# Patient Record
Sex: MALE | Race: WHITE | Employment: OTHER | RURAL
[De-identification: names, ages, dates, MRNs, and addresses within clinical notes are randomized per-mention and may not be internally consistent; named-entity substitution may affect disease eponyms.]

---

## 2017-02-01 ENCOUNTER — OFFICE VISIT (OUTPATIENT)
Dept: SURGERY | Age: 77
End: 2017-02-01

## 2017-02-01 VITALS
BODY MASS INDEX: 26.03 KG/M2 | DIASTOLIC BLOOD PRESSURE: 81 MMHG | SYSTOLIC BLOOD PRESSURE: 132 MMHG | WEIGHT: 192.2 LBS | HEART RATE: 60 BPM | HEIGHT: 72 IN

## 2017-02-01 DIAGNOSIS — D12.6 ADENOMATOUS COLON POLYP: Primary | ICD-10-CM

## 2017-02-01 DIAGNOSIS — Z01.818 PREOPERATIVE EXAMINATION: ICD-10-CM

## 2017-02-01 NOTE — PROGRESS NOTES
HISTORY OF PRESENT ILLNESS  Luciana Santoro is a 67 y.o. male. Patient was referred by Wiley Jolly MD for evaluation for  colonoscopy. HPI history of prior colonoscopy with polypectomy adenomatous polyps were removed and a 5 year follow-up colonoscopy was recommended. He has had some recent change in his bowel habit characterized by constipation he attributes this to the narcotic analgesics used around the time of his orthopedic surgery. Presently is fairly well compensated using MiraLAX and some leukocyte. There is been no blood in his stool there is been no unexplained weight loss or abdominal pain. Past Medical History   Diagnosis Date    CAD (coronary artery disease)      3 cardiac stents    Cervical spondylosis without myelopathy 2009    Chronic pain     Depressive disorder, not elsewhere classified     Diaphragmatic hernia without mention of obstruction or gangrene     GERD (gastroesophageal reflux disease)     Hypercholesterolemia     Hypertension     Hypertrophy of prostate without urinary obstruction and other lower urinary tract symptoms (LUTS) 2008    Insomnia, unspecified 2009    Lumbosacral spondylosis without myelopathy 2009    Osteoarthrosis involving, or with mention of more than one site, but not specified as generalized, multiple sites 2010    Varicose veins 12/11/2014       Past Surgical History   Procedure Laterality Date    Hx appendectomy      Hx hernia repair      Pr cabg, artery-vein, two  1/9/12    Hx heart catheterization  1998, 2001     3 stents    Hx colonoscopy  2010,     Hx endoscopy  2010     HH, no other abnl    Hx tonsillectomy      Hx heent       fx nose repair    Hx cataract removal       bilateral         Current Outpatient Prescriptions:     DULoxetine (CYMBALTA) 30 mg capsule, TAKE ONE CAPSULE BY MOUTH EVERY DAY, Disp: 30 Cap, Rfl: 2    DULoxetine (CYMBALTA) 60 mg capsule, Take 1 Cap by mouth daily. , Disp: 30 Cap, Rfl: 3   IBUPROFEN/DIPHENHYDRAMINE CIT (ADVIL PM PO), Take  by mouth., Disp: , Rfl:     DULoxetine (CYMBALTA) 60 mg capsule, Take 1 Cap by mouth every morning., Disp: 30 Cap, Rfl: 3    tamsulosin (FLOMAX) 0.4 mg capsule, Take 2 Caps by mouth nightly., Disp: 60 Cap, Rfl: 2    polyethylene glycol (MIRALAX) 17 gram packet, Take 1 Packet by mouth daily. , Disp: 15 Packet, Rfl: 0    OMEPRAZOLE PO, Take  by mouth nightly., Disp: , Rfl:     METOPROLOL TARTRATE PO, Take 12.5 mg by mouth two (2) times a day., Disp: , Rfl:     DOCUSATE CALCIUM (STOOL SOFTENER PO), Take  by mouth daily. , Disp: , Rfl:     multivitamin (ONE A DAY) tablet, Take 1 Tab by mouth daily. , Disp: , Rfl:     aspirin 81 mg tablet, Take 81 mg by mouth., Disp: , Rfl:     dutaseride (AVODART) 0.5 mg capsule, Take 0.5 mg by mouth Three (3) times a week., Disp: , Rfl:     simvastatin (ZOCOR) 20 mg tablet, Take  by mouth nightly., Disp: , Rfl:     melatonin 10 mg tab, Take  by mouth., Disp: , Rfl:     CARISOPRODOL (SOMA PO), Take  by mouth. somnapure at hs as needed, Disp: , Rfl:     celecoxib (CELEBREX) 200 mg capsule, Take 200 mg by mouth every morning. Indications: OSTEOARTHRITIS, Disp: , Rfl:     Review of patient's allergies indicates no known allergies. family history includes Cancer in his brother; Diabetes in his maternal grandfather; Heart Disease in his brother and father; Hypertension in his father. Social History     Social History    Marital status:      Spouse name: N/A    Number of children: N/A    Years of education: N/A     Occupational History    Not on file.      Social History Main Topics    Smoking status: Former Smoker     Packs/day: 1.00     Years: 10.00     Types: Cigarettes     Start date: 1/1/1961     Quit date: 1/1/1971    Smokeless tobacco: Never Used    Alcohol use No    Drug use: No    Sexual activity: Not on file     Other Topics Concern    Not on file     Social History Narrative         Review of Systems Constitutional: Negative. HENT: Negative. Eyes: Negative. Respiratory: Negative. Cardiovascular: Negative. Gastrointestinal: Positive for constipation. Negative for abdominal pain, blood in stool, diarrhea, heartburn, melena, nausea and vomiting. Genitourinary: Negative. Musculoskeletal: Negative. Skin: Negative. Neurological: Negative. Endo/Heme/Allergies: Negative. Psychiatric/Behavioral: Negative. Physical Exam   Constitutional: He is oriented to person, place, and time. He appears well-developed and well-nourished. No distress. HENT:   Head: Normocephalic and atraumatic. Right Ear: External ear normal.   Left Ear: External ear normal.   Nose: Nose normal.   Mouth/Throat: Oropharynx is clear and moist. No oropharyngeal exudate. Eyes: Conjunctivae and EOM are normal. Pupils are equal, round, and reactive to light. Right eye exhibits no discharge. Left eye exhibits no discharge. No scleral icterus. Neck: Normal range of motion. Neck supple. No JVD present. No tracheal deviation present. No thyromegaly present. Cardiovascular: Normal rate, regular rhythm, normal heart sounds and intact distal pulses. Exam reveals no gallop and no friction rub. No murmur heard. Pulmonary/Chest: Effort normal and breath sounds normal. No stridor. No respiratory distress. He has no wheezes. He has no rales. He exhibits no tenderness. Abdominal: Soft. Bowel sounds are normal. He exhibits no distension and no mass. There is no tenderness. There is no rebound and no guarding. Musculoskeletal: Normal range of motion. He exhibits no edema, tenderness or deformity. Lymphadenopathy:     He has no cervical adenopathy. Neurological: He is alert and oriented to person, place, and time. He has normal reflexes. He displays normal reflexes. No cranial nerve deficit. He exhibits normal muscle tone. Coordination normal.   Skin: Skin is warm and dry. No rash noted. He is not diaphoretic. No erythema. No pallor. Psychiatric: He has a normal mood and affect. His behavior is normal. Judgment and thought content normal.       ASSESSMENT and PLAN    No history of colon polyps. Requires updated H&P for institutional requirements for surgery. Patient will be scheduled at \[facility]\"" for colonoscopy. She prefers magnesium citrate prep. The expected benefits and potential risks and alternatives are discussed with the patient who demonstrates understanding and expresses his wish to have the procedure. More than half of the time spent with the patient was in face to face counseling. I spent 45 minutes in this encounter with the patient.

## 2017-02-01 NOTE — PATIENT INSTRUCTIONS
Colonoscopy: Before Your Procedure  What is a colonoscopy? A colonoscopy is a test that lets a doctor look inside your colon. The doctor uses a thin, lighted tube called a colonoscope to look for problems. These include small growths called polyps, cancer, or bleeding. During the test, the doctor can take samples of tissue that can be checked for cancer or other problems. This is called a biopsy. The doctor can also take out polyps. Before the test, you will need to stop eating solid foods. You also will drink a liquid or take a tablet that cleans out your colon. This helps your doctor be able to see inside your colon during the test.  Follow-up care is a key part of your treatment and safety. Be sure to make and go to all appointments, and call your doctor if you are having problems. It's also a good idea to know your test results and keep a list of the medicines you take. What happens before the procedure? Procedures can be stressful. This information will help you understand what you can expect. And it will help you safely prepare for your procedure. Preparing for the procedure  · Understand exactly what procedure is planned, along with the risks, benefits, and other options. · Tell your doctors ALL the medicines, vitamins, supplements, and herbal remedies you take. Some of these can increase the risk of bleeding or interact with anesthesia. · If you take blood thinners, such as warfarin (Coumadin), clopidogrel (Plavix), or aspirin, be sure to talk to your doctor. He or she will tell you if you should stop taking these medicines before your procedure. Make sure that you understand exactly what your doctor wants you to do. · Your doctor will tell you which medicines to take or stop before your procedure. You may need to stop taking certain medicines a week or more before the procedure. So talk to your doctor as soon as you can. · If you have an advance directive, let your doctor know.  It may include a living will and a durable power of  for health care. Bring a copy to the hospital. If you don't have one, you may want to prepare one. It lets your doctor and loved ones know your health care wishes. Doctors advise that everyone prepare these papers before any type of surgery or procedure. Before the procedure  · Follow your doctor's directions about when to stop eating solid foods and drink only clear liquids. You can drink water, clear juices, clear broths, flavored ice pops, and gelatin (such as Jell-O). Do not eat or drink anything red or purple. This includes grape juice and grape-flavored ice pops. It also includes fruit punch and cherry gelatin. · Drink the \"colon prep\" liquid as your doctor tells you. You will want to stay home, because the liquid will make you go to the bathroom a lot. Your stools will be loose and watery. It is very important to drink all of the liquid. If you have problems drinking it, call your doctor. Some doctors may have you take a tablet rather than drink a liquid. · Do not eat any solid foods after you drink the colon prep. · Stop drinking clear liquids 6 to 8 hours before the test.  What happens on the day of the procedure? · Follow the instructions exactly about when to stop eating and drinking. If you don't, your procedure may be canceled. If your doctor told you to take your medicines on the day of the procedure, take them with only a sip of water. · Take a bath or shower before you come in for your procedure. Do not apply lotions, perfumes, deodorants, or nail polish. · Take off all jewelry and piercings. And take out contact lenses, if you wear them. At the [address] [place] [address] or [facility]  · Bring a picture ID. · You will be kept comfortable and safe by your anesthesia provider. The anesthesia may make you sleep. · You will lie on your back or your side with your knees drawn up toward your belly.  The doctor will gently put a gloved finger into your anus. Then the doctor puts the scope in and moves it into your colon. The scope goes in easily because it is lubricated. · The doctor may also use small tools to take tissue samples for a biopsy or to remove polyps. This does not hurt. · The test usually takes 30 to 45 minutes. But it may take longer. It depends on what is found and what is done. Going home  · Be sure you have someone to drive you home. Anesthesia and pain medicine make it unsafe for you to drive. · You will be given more specific instructions about recovering from your procedure. When should you call your doctor? · You have questions or concerns. · You don't understand how to prepare for your procedure. · You are having trouble with the bowel prep. · You become ill before the procedure (such as fever, flu, or a cold). · You need to reschedule or have changed your mind about having the procedure. Where can you learn more? Go to http://zamzam-kun.info/. Enter C315 in the search box to learn more about \"Colonoscopy: Before Your Procedure. \"  Current as of: August 9, 2016  Content Version: 11.1  © 7102-5080 Healthwise, Incorporated. Care instructions adapted under license by [employer] (which disclaims liability or warranty for this information). If you have questions about a medical condition or this instruction, always ask your healthcare professional. [address] any warranty or liability for your use of this information.

## 2017-02-06 RX ORDER — TAMSULOSIN HYDROCHLORIDE 0.4 MG/1
CAPSULE ORAL
Qty: 60 CAP | Refills: 2 | Status: SHIPPED | OUTPATIENT
Start: 2017-02-06 | End: 2017-05-05 | Stop reason: SDUPTHER

## 2017-03-08 ENCOUNTER — OFFICE VISIT (OUTPATIENT)
Dept: SURGERY | Age: 77
End: 2017-03-08

## 2017-03-08 VITALS
SYSTOLIC BLOOD PRESSURE: 106 MMHG | WEIGHT: 192.2 LBS | BODY MASS INDEX: 26.03 KG/M2 | DIASTOLIC BLOOD PRESSURE: 67 MMHG | HEART RATE: 82 BPM | HEIGHT: 72 IN

## 2017-03-08 DIAGNOSIS — K63.5 HYPERPLASTIC COLON POLYP: ICD-10-CM

## 2017-03-08 DIAGNOSIS — Z86.010 HX OF ADENOMATOUS COLONIC POLYPS: Primary | ICD-10-CM

## 2017-03-08 NOTE — PROGRESS NOTES
HISTORY OF PRESENT ILLNESS  Augustin Sena is a 67 y.o. male. Status post colonoscopy with polypectomy. A hyperplastic polyp was removed from the rectum. No recurrent adenomatous polyps were identified on this study. HPI he reports having tolerated the preparation and the procedure well    ROS there are no new complaints    Physical Exam his examination is benign    ASSESSMENT and PLAN  Hyperplastic colon polyp removed. History of adenomatous colon polyps, recommend 5 year evaluation for consideration of repeat colonoscopy in the context of his health at that time. That is to say if this is health is is good then as it is today I would consider repeat colonoscopy. If there were issues compromising his health and the risk-benefit ratio seemed unfavorable I would not recommend colonoscopy. More than half of the time spent with the patient was in face to face counseling. I spent 15 minutes in this encounter with the patient.

## 2017-03-08 NOTE — MR AVS SNAPSHOT
Visit Information Date & Time Provider Department Dept. Phone Encounter #  
 3/8/2017 10:30 AM Avelina Villalobos  [address] [place] [phone] [id number] Follow-up Instructions Return in about 5 years (around 3/8/2022). Follow-up and Disposition History Upcoming Health Maintenance Date Due ZOSTER VACCINE AGE 60> 11/26/1999 GLAUCOMA SCREENING Q2Y 11/26/2004 MEDICARE YEARLY EXAM 12/10/2016 COLONOSCOPY 2/21/2022 DTaP/Tdap/Td series (2 - Td) 12/10/2025 Allergies as of 3/8/2017  Review Complete On: 3/8/2017 By: Deya Hui LPN No Known Allergies Current Immunizations  Reviewed on 10/21/2016 Name Date Influenza High Dose Vaccine PF 10/21/2016 Influenza Vaccine 10/27/2014 Influenza Vaccine Seretha Cave) 12/10/2015 Pneumococcal Conjugate (PCV-13) 10/21/2016, 12/7/2015 Pneumococcal Polysaccharide (PPSV-23) 8/28/2013 Tdap 12/10/2015 Not reviewed this visit You Were Diagnosed With   
  
 Codes Comments Hx of adenomatous colonic polyps    -  Primary ICD-10-CM: Z86.010 
ICD-9-CM: V12.72 Hyperplastic colon polyp     ICD-10-CM: K63.5 ICD-9-CM: 211.3 Vitals BP Pulse Height(growth percentile) Weight(growth percentile) BMI Smoking Status 106/67 82 6' (1.829 m) 192 lb 3.2 oz (87.2 kg) 26.07 kg/m2 Former Smoker Vitals History BMI and BSA Data Body Mass Index Body Surface Area 26.07 kg/m 2 2.1 m 2 Preferred Pharmacy Pharmacy Name Phone [id number] [address] [phone] Your Updated Medication List  
  
   
This list is accurate as of: 3/8/17  2:51 PM.  Always use your most recent med list. ADVIL PM PO Take  by mouth. aspirin 81 mg tablet Take 81 mg by mouth. AVODART 0.5 mg capsule Generic drug:  dutasteride Take 0.5 mg by mouth Three (3) times a week. celecoxib 200 mg capsule Commonly known as:  CELEBREX Take 200 mg by mouth every morning. Indications: OSTEOARTHRITIS * DULoxetine 60 mg capsule Commonly known as:  CYMBALTA Take 1 Cap by mouth every morning. * DULoxetine 60 mg capsule Commonly known as:  CYMBALTA Take 1 Cap by mouth daily. * DULoxetine 30 mg capsule Commonly known as:  CYMBALTA TAKE ONE CAPSULE BY MOUTH EVERY DAY  
  
 melatonin 10 mg Tab Take  by mouth. METOPROLOL TARTRATE PO Take 12.5 mg by mouth two (2) times a day. multivitamin tablet Commonly known as:  ONE A DAY Take 1 Tab by mouth daily. OMEPRAZOLE PO Take  by mouth nightly. polyethylene glycol 17 gram packet Commonly known as:  Kreg Patron Take 1 Packet by mouth daily. simvastatin 20 mg tablet Commonly known as:  ZOCOR Take  by mouth nightly. SOMA PO Take  by mouth. somnapure at hs as needed STOOL SOFTENER PO Take  by mouth daily. tamsulosin 0.4 mg capsule Commonly known as:  FLOMAX TAKE 2 CAPSULE BY MOUTH NIGHTLY. * Notice: This list has 3 medication(s) that are the same as other medications prescribed for you. Read the directions carefully, and ask your doctor or other care provider to review them with you. Follow-up Instructions Return in about 5 years (around 3/8/2022). Introducing [facility] & HEALTH SERVICES! Jose Pepe introduces [employer] patient portal. Now you can access parts of your medical record, email your doctor's office, and request medication refills online. 1. In your internet browser, go to [email or web address] 2. Click on the First Time User? Click Here link in the Sign In box. You will see the New Member Sign Up page. 3. Enter your [employer] Access Code exactly as it appears below. You will not need to use this code after youve completed the sign-up process.  If you do not sign up before the expiration date, you must request a new code. · [employer] Access Code: [email or web address] Expires: 5/2/2017 10:24 AM 
 
4. Enter the last four digits of your Social Security Number (xxxx) and Date of Birth (mm/dd/yyyy) as indicated and click Submit. You will be taken to the next sign-up page. 5. Create a [employer] ID. This will be your [employer] login ID and cannot be changed, so think of one that is secure and easy to remember. 6. Create a [employer] password. You can change your password at any time. 7. Enter your Password Reset Question and Answer. This can be used at a later time if you forget your password. 8. Enter your e-mail address. You will receive e-mail notification when new information is available in [address] 9. Click Sign Up. You can now view and download portions of your medical record. 10. Click the Download Summary menu link to download a portable copy of your medical information. If you have questions, please visit the Frequently Asked Questions section of the [employer] website. Remember, [employer] is NOT to be used for urgent needs. For medical emergencies, dial 911. Now available from your iPhone and Android! Please provide this summary of care documentation to your next provider. Your primary care clinician is listed as Michael Jain. If you have any questions after today's visit, please call [phone].

## 2017-04-07 ENCOUNTER — CLINICAL SUPPORT (OUTPATIENT)
Dept: FAMILY MEDICINE CLINIC | Age: 77
End: 2017-04-07

## 2017-04-07 DIAGNOSIS — E78.2 MIXED HYPERLIPIDEMIA: Primary | ICD-10-CM

## 2017-04-07 DIAGNOSIS — I25.10 ATHEROSCLEROSIS OF NATIVE CORONARY ARTERY OF NATIVE HEART, ANGINA PRESENCE UNSPECIFIED: ICD-10-CM

## 2017-04-07 NOTE — MR AVS SNAPSHOT
Visit Information Date & Time Provider Department Dept. Phone Encounter #  
 4/7/2017  8:45 AM [facility] [address] [facility] [phone] [id number] Upcoming Health Maintenance Date Due ZOSTER VACCINE AGE 60> 11/26/1999 GLAUCOMA SCREENING Q2Y 11/26/2004 MEDICARE YEARLY EXAM 12/10/2016 COLONOSCOPY 2/21/2022 DTaP/Tdap/Td series (2 - Td) 12/10/2025 Allergies as of 4/7/2017  Review Complete On: 4/7/2017 By: Mine Ruby No Known Allergies Current Immunizations  Reviewed on 10/21/2016 Name Date Influenza High Dose Vaccine PF 10/21/2016 Influenza Vaccine 10/27/2014 Influenza Vaccine Corine Hinton) 12/10/2015 Pneumococcal Conjugate (PCV-13) 10/21/2016, 12/7/2015 Pneumococcal Polysaccharide (PPSV-23) 8/28/2013 Tdap 12/10/2015 Not reviewed this visit You Were Diagnosed With   
  
 Codes Comments Mixed hyperlipidemia    -  Primary ICD-10-CM: D02.7 ICD-9-CM: 272.2 Atherosclerosis of native coronary artery of native heart, angina presence unspecified     ICD-10-CM: I25.10 ICD-9-CM: 414.01 Vitals Smoking Status Former Smoker Preferred Pharmacy Pharmacy Name Phone [id number] [address] [phone] Your Updated Medication List  
  
   
This list is accurate as of: 4/7/17  9:13 AM.  Always use your most recent med list. ADVIL PM PO Take  by mouth. aspirin 81 mg tablet Take 81 mg by mouth. AVODART 0.5 mg capsule Generic drug:  dutasteride Take 0.5 mg by mouth Three (3) times a week. celecoxib 200 mg capsule Commonly known as:  CELEBREX Take 200 mg by mouth every morning. Indications: OSTEOARTHRITIS * DULoxetine 60 mg capsule Commonly known as:  CYMBALTA Take 1 Cap by mouth every morning. * DULoxetine 60 mg capsule Commonly known as:  CYMBALTA Take 1 Cap by mouth daily. * DULoxetine 30 mg capsule Commonly known as:  CYMBALTA TAKE ONE CAPSULE BY MOUTH EVERY DAY  
  
 melatonin 10 mg Tab Take  by mouth. METOPROLOL TARTRATE PO Take 12.5 mg by mouth two (2) times a day. multivitamin tablet Commonly known as:  ONE A DAY Take 1 Tab by mouth daily. OMEPRAZOLE PO Take  by mouth nightly. polyethylene glycol 17 gram packet Commonly known as:  Tracie Pile Take 1 Packet by mouth daily. simvastatin 20 mg tablet Commonly known as:  ZOCOR Take  by mouth nightly. SOMA PO Take  by mouth. somnapure at hs as needed STOOL SOFTENER PO Take  by mouth daily. tamsulosin 0.4 mg capsule Commonly known as:  FLOMAX TAKE 2 CAPSULE BY MOUTH NIGHTLY. * Notice: This list has 3 medication(s) that are the same as other medications prescribed for you. Read the directions carefully, and ask your doctor or other care provider to review them with you. We Performed the Following COLLECTION CAPILLARY BLOOD SPECIMEN [05480 CPT(R)] HEPATIC FUNCTION PANEL [04876 CPT(R)] LIPID PANEL [11428 CPT(R)] Introducing [facility]! Marilee Benavidez introduces [employer] patient portal. Now you can access parts of your medical record, email your doctor's office, and request medication refills online. 1. In your internet browser, go to [email or web address] 2. Click on the First Time User? Click Here link in the Sign In box. You will see the New Member Sign Up page. 3. Enter your [employer] Access Code exactly as it appears below. You will not need to use this code after youve completed the sign-up process. If you do not sign up before the expiration date, you must request a new code. · [employer] Access Code: [email or web address] Expires: 5/2/2017 11:24 AM 
 
4.  Enter the last four digits of your Social Security Number (xxxx) and Date of Birth (mm/dd/yyyy) as indicated and click Submit. You will be taken to the next sign-up page. 5. Create a [employer] ID. This will be your [employer] login ID and cannot be changed, so think of one that is secure and easy to remember. 6. Create a [employer] password. You can change your password at any time. 7. Enter your Password Reset Question and Answer. This can be used at a later time if you forget your password. 8. Enter your e-mail address. You will receive e-mail notification when new information is available in [address] 9. Click Sign Up. You can now view and download portions of your medical record. 10. Click the Download Summary menu link to download a portable copy of your medical information. If you have questions, please visit the Frequently Asked Questions section of the [employer] website. Remember, [employer] is NOT to be used for urgent needs. For medical emergencies, dial 911. Now available from your iPhone and Android! Please provide this summary of care documentation to your next provider. Your primary care clinician is listed as Janes Covarrubias. If you have any questions after today's visit, please call [phone].

## 2017-04-08 LAB
ALBUMIN SERPL-MCNC: 4.2 G/DL (ref 3.5–4.8)
ALP SERPL-CCNC: 57 IU/L (ref 39–117)
ALT SERPL-CCNC: 14 IU/L (ref 0–44)
AST SERPL-CCNC: 20 IU/L (ref 0–40)
BILIRUB DIRECT SERPL-MCNC: 0.12 MG/DL (ref 0–0.4)
BILIRUB SERPL-MCNC: 0.5 MG/DL (ref 0–1.2)
PROT SERPL-MCNC: 6.1 G/DL (ref 6–8.5)

## 2017-04-19 LAB
CHOLEST SERPL-MCNC: NORMAL MG/DL
HDLC SERPL-MCNC: NORMAL MG/DL
SPECIMEN STATUS REPORT, ROLRST: NORMAL
TRIGL SERPL-MCNC: NORMAL MG/DL (ref ?–150)

## 2017-04-20 LAB
CHOLEST SERPL-MCNC: 131 MG/DL (ref 100–199)
HDLC SERPL-MCNC: 52 MG/DL
LDLC SERPL CALC-MCNC: 60 MG/DL (ref 0–99)
TRIGL SERPL-MCNC: 95 MG/DL (ref 0–149)
VLDLC SERPL CALC-MCNC: 19 MG/DL (ref 5–40)

## 2017-04-24 RX ORDER — DULOXETIN HYDROCHLORIDE 30 MG/1
CAPSULE, DELAYED RELEASE ORAL
Qty: 30 CAP | Refills: 0 | Status: SHIPPED | OUTPATIENT
Start: 2017-04-24 | End: 2017-05-22 | Stop reason: SDUPTHER

## 2017-05-05 RX ORDER — TAMSULOSIN HYDROCHLORIDE 0.4 MG/1
CAPSULE ORAL
Qty: 60 CAP | Refills: 1 | Status: SHIPPED | OUTPATIENT
Start: 2017-05-05 | End: 2018-02-25 | Stop reason: SDUPTHER

## 2017-05-22 RX ORDER — DULOXETIN HYDROCHLORIDE 30 MG/1
CAPSULE, DELAYED RELEASE ORAL
Qty: 30 CAP | Refills: 0 | Status: SHIPPED | OUTPATIENT
Start: 2017-05-22 | End: 2017-07-07 | Stop reason: SDUPTHER

## 2017-06-27 ENCOUNTER — OFFICE VISIT (OUTPATIENT)
Dept: FAMILY MEDICINE CLINIC | Age: 77
End: 2017-06-27

## 2017-06-27 VITALS
OXYGEN SATURATION: 96 % | HEIGHT: 72 IN | BODY MASS INDEX: 25.06 KG/M2 | DIASTOLIC BLOOD PRESSURE: 70 MMHG | HEART RATE: 85 BPM | TEMPERATURE: 98.4 F | RESPIRATION RATE: 16 BRPM | SYSTOLIC BLOOD PRESSURE: 110 MMHG | WEIGHT: 185 LBS

## 2017-06-27 DIAGNOSIS — Z00.00 HEALTH CARE MAINTENANCE: ICD-10-CM

## 2017-06-27 DIAGNOSIS — E78.00 HYPERCHOLESTEROLEMIA: ICD-10-CM

## 2017-06-27 DIAGNOSIS — M15.9 PRIMARY OSTEOARTHRITIS INVOLVING MULTIPLE JOINTS: ICD-10-CM

## 2017-06-27 DIAGNOSIS — Z00.00 ENCOUNTER FOR MEDICARE ANNUAL WELLNESS EXAM: ICD-10-CM

## 2017-06-27 DIAGNOSIS — I10 ESSENTIAL HYPERTENSION: Primary | ICD-10-CM

## 2017-06-27 NOTE — ACP (ADVANCE CARE PLANNING)
ACP planning discuss with patient , do not know if he have an ACP or not  ,form given to patient ,to complete.

## 2017-06-27 NOTE — MR AVS SNAPSHOT
Visit Information Date & Time Provider Department Dept. Phone Encounter #  
 6/27/2017  3:00 PM Juve Estrada NP [address] [phone] [id number] Upcoming Health Maintenance Date Due ZOSTER VACCINE AGE 60> 11/26/1999 GLAUCOMA SCREENING Q2Y 11/26/2004 MEDICARE YEARLY EXAM 12/10/2016 INFLUENZA AGE 9 TO ADULT 8/1/2017 COLONOSCOPY 2/21/2022 DTaP/Tdap/Td series (2 - Td) 12/10/2025 Allergies as of 6/27/2017  Review Complete On: 6/27/2017 By: Jane Moran LPN No Known Allergies Current Immunizations  Reviewed on 10/21/2016 Name Date Influenza High Dose Vaccine PF 10/21/2016 Influenza Vaccine 10/27/2014 Influenza Vaccine Otila Aaron) 12/10/2015 Pneumococcal Conjugate (PCV-13) 10/21/2016, 12/7/2015 Pneumococcal Polysaccharide (PPSV-23) 8/28/2013 Tdap 12/10/2015 Not reviewed this visit You Were Diagnosed With   
  
 Codes Comments Essential hypertension    -  Primary ICD-10-CM: I10 
ICD-9-CM: 401.9 Primary osteoarthritis involving multiple joints     ICD-10-CM: M15.0 ICD-9-CM: 715.09 Hypercholesterolemia     ICD-10-CM: E78.00 ICD-9-CM: 272.0 Health care maintenance     ICD-10-CM: Z00.00 ICD-9-CM: V70.0 Vitals BP Pulse Temp Resp Height(growth percentile) Weight(growth percentile) 110/70 85 98.4 °F (36.9 °C) (Oral) 16 6' (1.829 m) 185 lb (83.9 kg) SpO2 BMI Smoking Status 96% 25.09 kg/m2 Former Smoker BMI and BSA Data Body Mass Index Body Surface Area 25.09 kg/m 2 2.06 m 2 Preferred Pharmacy Pharmacy Name Phone [id number] [place] [phone] Your Updated Medication List  
  
   
This list is accurate as of: 6/27/17  3:46 PM.  Always use your most recent med list. ADVIL PM PO Take  by mouth. aspirin 81 mg tablet Take 81 mg by mouth. AVODART 0.5 mg capsule Generic drug:  dutasteride Take 0.5 mg by mouth Three (3) times a week. celecoxib 200 mg capsule Commonly known as:  CELEBREX Take 200 mg by mouth every morning. Indications: OSTEOARTHRITIS * DULoxetine 60 mg capsule Commonly known as:  CYMBALTA Take 1 Cap by mouth every morning. * DULoxetine 30 mg capsule Commonly known as:  CYMBALTA TAKE ONE CAPSULE BY MOUTH EVERY DAY  
  
 melatonin 10 mg Tab Take  by mouth. METOPROLOL TARTRATE PO Take 12.5 mg by mouth two (2) times a day. multivitamin tablet Commonly known as:  ONE A DAY Take 1 Tab by mouth daily. OMEPRAZOLE PO Take  by mouth nightly. polyethylene glycol 17 gram packet Commonly known as:  [place] [employer] Take 1 Packet by mouth daily. simvastatin 20 mg tablet Commonly known as:  ZOCOR Take  by mouth nightly. SOMA PO Take  by mouth. somnapure at hs as needed STOOL SOFTENER PO Take  by mouth daily. tamsulosin 0.4 mg capsule Commonly known as:  FLOMAX TAKE 2 CAPSULE BY MOUTH NIGHTLY. * Notice: This list has 2 medication(s) that are the same as other medications prescribed for you. Read the directions carefully, and ask your doctor or other care provider to review them with you. We Performed the Following CBC WITH AUTOMATED DIFF [19039 CPT(R)] COLLECTION VENOUS BLOOD,VENIPUNCTURE C5564400 CPT(R)] HEMOGLOBIN A1C WITH EAG [24670 CPT(R)] LIPID PANEL [08365 CPT(R)] METABOLIC PANEL, COMPREHENSIVE [14500 CPT(R)] VITAMIN B12 [id number] CPT(R)] VITAMIN D, 25 HYDROXY [id number] CPT(R)] Introducing [facility]! Tony Carrero introduces Stat patient portal. Now you can access parts of your medical record, email your doctor's office, and request medication refills online. 1. In your internet browser, go to [email or web address] 2. Click on the First Time User? Click Here link in the Sign In box.  You will see the New Member Sign Up page. 3. Enter your Towi Access Code exactly as it appears below. You will not need to use this code after youve completed the sign-up process. If you do not sign up before the expiration date, you must request a new code. · Towi Access Code: [email or web address] Expires: 9/25/2017  3:46 PM 
 
4. Enter the last four digits of your Social Security Number (xxxx) and Date of Birth (mm/dd/yyyy) as indicated and click Submit. You will be taken to the next sign-up page. 5. Create a Towi ID. This will be your Towi login ID and cannot be changed, so think of one that is secure and easy to remember. 6. Create a Towi password. You can change your password at any time. 7. Enter your Password Reset Question and Answer. This can be used at a later time if you forget your password. 8. Enter your e-mail address. You will receive e-mail notification when new information is available in [address]. 9. Click Sign Up. You can now view and download portions of your medical record. 10. Click the Download Summary menu link to download a portable copy of your medical information. If you have questions, please visit the Frequently Asked Questions section of the Towi website. Remember, Towi is NOT to be used for urgent needs. For medical emergencies, dial 911. Now available from your iPhone and Android! Please provide this summary of care documentation to your next provider. Your primary care clinician is listed as Lynann Dakins. If you have any questions after today's visit, please call [phone].

## 2017-06-28 LAB
25(OH)D3+25(OH)D2 SERPL-MCNC: 31.5 NG/ML (ref 30–100)
ALBUMIN SERPL-MCNC: 4.5 G/DL (ref 3.5–4.8)
ALBUMIN/GLOB SERPL: 2.3 {RATIO} (ref 1.2–2.2)
ALP SERPL-CCNC: 52 IU/L (ref 39–117)
ALT SERPL-CCNC: 13 IU/L (ref 0–44)
AST SERPL-CCNC: 22 IU/L (ref 0–40)
BASOPHILS # BLD AUTO: 0 X10E3/UL (ref 0–0.2)
BASOPHILS NFR BLD AUTO: 0 %
BILIRUB SERPL-MCNC: 0.7 MG/DL (ref 0–1.2)
BUN SERPL-MCNC: 14 MG/DL (ref 8–27)
BUN/CREAT SERPL: 15 (ref 10–24)
CALCIUM SERPL-MCNC: 9.4 MG/DL (ref 8.6–10.2)
CHLORIDE SERPL-SCNC: 102 MMOL/L (ref 96–106)
CHOLEST SERPL-MCNC: 122 MG/DL (ref 100–199)
CO2 SERPL-SCNC: 26 MMOL/L (ref 18–29)
CREAT SERPL-MCNC: 0.95 MG/DL (ref 0.76–1.27)
EOSINOPHIL # BLD AUTO: 0.2 X10E3/UL (ref 0–0.4)
EOSINOPHIL NFR BLD AUTO: 5 %
ERYTHROCYTE [DISTWIDTH] IN BLOOD BY AUTOMATED COUNT: 13.5 % (ref 12.3–15.4)
EST. AVERAGE GLUCOSE BLD GHB EST-MCNC: 114 MG/DL
GLOBULIN SER CALC-MCNC: 2 G/DL (ref 1.5–4.5)
GLUCOSE SERPL-MCNC: 101 MG/DL (ref 65–99)
HBA1C MFR BLD: 5.6 % (ref 4.8–5.6)
HCT VFR BLD AUTO: 40.2 % (ref 37.5–51)
HDLC SERPL-MCNC: 56 MG/DL
HGB BLD-MCNC: 13.7 G/DL (ref 12.6–17.7)
IMM GRANULOCYTES # BLD: 0 X10E3/UL (ref 0–0.1)
IMM GRANULOCYTES NFR BLD: 0 %
LDLC SERPL CALC-MCNC: 50 MG/DL (ref 0–99)
LYMPHOCYTES # BLD AUTO: 1.4 X10E3/UL (ref 0.7–3.1)
LYMPHOCYTES NFR BLD AUTO: 27 %
MCH RBC QN AUTO: 30.8 PG (ref 26.6–33)
MCHC RBC AUTO-ENTMCNC: 34.1 G/DL (ref 31.5–35.7)
MCV RBC AUTO: 90 FL (ref 79–97)
MONOCYTES # BLD AUTO: 0.3 X10E3/UL (ref 0.1–0.9)
MONOCYTES NFR BLD AUTO: 6 %
NEUTROPHILS # BLD AUTO: 3.1 X10E3/UL (ref 1.4–7)
NEUTROPHILS NFR BLD AUTO: 62 %
PLATELET # BLD AUTO: 183 X10E3/UL (ref 150–379)
POTASSIUM SERPL-SCNC: 4.7 MMOL/L (ref 3.5–5.2)
PROT SERPL-MCNC: 6.5 G/DL (ref 6–8.5)
RBC # BLD AUTO: 4.45 X10E6/UL (ref 4.14–5.8)
SODIUM SERPL-SCNC: 143 MMOL/L (ref 134–144)
TRIGL SERPL-MCNC: 81 MG/DL (ref 0–149)
VIT B12 SERPL-MCNC: 459 PG/ML (ref 211–946)
VLDLC SERPL CALC-MCNC: 16 MG/DL (ref 5–40)
WBC # BLD AUTO: 5 X10E3/UL (ref 3.4–10.8)

## 2017-06-28 NOTE — PROGRESS NOTES
Subjective:     Karin Alexis is a 67 y.o. male who presents today with the following:  Chief Complaint   Patient presents with    Annual Wellness Visit   Presents for Medicare Wellness Exam and evaluation and management for hypertension. Endorses pain at top of L shoulder without any other interval problems and overall reports doing well. Scheduled to see Dr Augustin Harrell tomorrow. Followed by Dr Dennis Wheeler for yearly dermatology skin check and Dr Latisha Drake for eye care. COMPLIANT WITH MEDICATION:   HTN; Denies chest pain, dyspnea, palpitations, headache and blurred vision. Blood pressure normotensive. ROS:  Gen: denies fever, chills, fatigue, weight loss, weight gain  HEENT:denies blurry vision, nasal congestion, sore throat  Resp: denies dypsnea, cough, wheezing  CV: denies chest pain radiating to the jaws or arms, palpitations, lower extremity edema  Abd: denies nausea, vomiting, diarrhea, constipation  Neuro: denies numbness/tingling  Endo: denies polyuria, polydipsia, heat/cold intolerance  Heme: no lymphadenopathy    No Known Allergies      Current Outpatient Prescriptions:     tamsulosin (FLOMAX) 0.4 mg capsule, TAKE 2 CAPSULE BY MOUTH NIGHTLY., Disp: 60 Cap, Rfl: 1    melatonin 10 mg tab, Take  by mouth., Disp: , Rfl:     CARISOPRODOL (SOMA PO), Take  by mouth. somnapure at hs as needed, Disp: , Rfl:     IBUPROFEN/DIPHENHYDRAMINE CIT (ADVIL PM PO), Take  by mouth., Disp: , Rfl:     polyethylene glycol (MIRALAX) 17 gram packet, Take 1 Packet by mouth daily. , Disp: 15 Packet, Rfl: 0    OMEPRAZOLE PO, Take  by mouth nightly., Disp: , Rfl:     METOPROLOL TARTRATE PO, Take 12.5 mg by mouth two (2) times a day., Disp: , Rfl:     celecoxib (CELEBREX) 200 mg capsule, Take 200 mg by mouth every morning. Indications: OSTEOARTHRITIS, Disp: , Rfl:     DOCUSATE CALCIUM (STOOL SOFTENER PO), Take  by mouth daily. , Disp: , Rfl:     multivitamin (ONE A DAY) tablet, Take 1 Tab by mouth daily. , Disp: , Rfl:     aspirin 81 mg tablet, Take 81 mg by mouth., Disp: , Rfl:     dutaseride (AVODART) 0.5 mg capsule, Take 0.5 mg by mouth Three (3) times a week., Disp: , Rfl:     simvastatin (ZOCOR) 20 mg tablet, Take  by mouth nightly., Disp: , Rfl:     DULoxetine (CYMBALTA) 30 mg capsule, TAKE ONE CAPSULE BY MOUTH EVERY DAY, Disp: 30 Cap, Rfl: 0    DULoxetine (CYMBALTA) 60 mg capsule, Take 1 Cap by mouth every morning., Disp: 30 Cap, Rfl: 3    Past Medical History:   Diagnosis Date    CAD (coronary artery disease)     3 cardiac stents    Cervical spondylosis without myelopathy 2009    Chronic pain     Depressive disorder, not elsewhere classified     Diaphragmatic hernia without mention of obstruction or gangrene     GERD (gastroesophageal reflux disease)     Hypercholesterolemia     Hypertension     Hypertrophy of prostate without urinary obstruction and other lower urinary tract symptoms (LUTS) 2008    Insomnia, unspecified 2009    Lumbosacral spondylosis without myelopathy 2009    Osteoarthrosis involving, or with mention of more than one site, but not specified as generalized, multiple sites 2010    Varicose veins 12/11/2014       Past Surgical History:   Procedure Laterality Date    CABG, ARTERY-VEIN, TWO  1/9/12    HX APPENDECTOMY      HX CATARACT REMOVAL      bilateral    HX COLONOSCOPY  2010,     HX COLONOSCOPY  2017    hyperplastic polyp    HX ENDOSCOPY  2010    HH, no other abnl    HX HEART CATHETERIZATION  1998, 2001    3 stents    HX HEENT      fx nose repair    HX HERNIA REPAIR      HX TONSILLECTOMY         History   Smoking Status    Former Smoker    Packs/day: 1.00    Years: 10.00    Types: Cigarettes    Start date: 1/1/1961   Jerica March Quit date: 1/1/1971   Smokeless Tobacco    Never Used       Social History     Social History    Marital status:      Spouse name: N/A    Number of children: N/A    Years of education: N/A     Social History Main Topics    Smoking status: Former Smoker     Packs/day: 1.00     Years: 10.00     Types: Cigarettes     Start date: 1/1/1961     Quit date: 1/1/1971    Smokeless tobacco: Never Used    Alcohol use No    Drug use: No    Sexual activity: Not Asked     Other Topics Concern     Service No    Blood Transfusions No    Caffeine Concern No    Occupational Exposure Yes    Hobby Hazards No    Stress Concern No    Weight Concern Yes    Special Diet No    Back Care No    Exercise Yes    Bike Helmet No    Seat Belt Yes    Self-Exams No     Social History Narrative       Family History   Problem Relation Age of Onset    Hypertension Father     Heart Disease Father     Cancer Brother     Heart Disease Brother     Diabetes Maternal Grandfather          Objective:     Visit Vitals    /70    Pulse 85    Temp 98.4 °F (36.9 °C) (Oral)    Resp 16    Ht 6' (1.829 m)    Wt 185 lb (83.9 kg)    SpO2 96%    BMI 25.09 kg/m2     Body mass index is 25.09 kg/(m^2). General: Alert and oriented. No acute distress. Well nourished  HEENT :  Ears:TMs are normal. Canals are clear. Eyes: pupils equal, round, react to light and accommodation. Extra ocular movements intact. Nose: patent. Mouth and throat is clear. Neck:supple full range of motion no thyromegaly. Trachea midline, No carotid bruits. No significant lymphadenopathy  Lungs[de-identified] clear to auscultation without wheezes, rales, or rhonchi. Heart :RRR, S1 & S2 are normal intensity. No murmur; no gallop  Abdomen: bowel sounds active. No tenderness, guarding, rebound, masses, hepatic or spleen enlargement  Back: no CVA tenderness. Extremities: without clubbing, cyanosis, or edema  Pulses: radial and femoral pulses are normal  Neuro: HMF intact.  Cranial nerves II through XII grossly normal.  Motor: is 5 over 5 and symmetrical.   Deep tendon reflexes: +2 equal    Results for orders placed or performed in visit on 04/07/17   HEPATIC FUNCTION PANEL   Result Value Ref Range Protein, total 6.1 6.0 - 8.5 g/dL    Albumin 4.2 3.5 - 4.8 g/dL    Bilirubin, total 0.5 0.0 - 1.2 mg/dL    Bilirubin, direct 0.12 0.00 - 0.40 mg/dL    Alk. phosphatase 57 39 - 117 IU/L    AST (SGOT) 20 0 - 40 IU/L    ALT (SGPT) 14 0 - 44 IU/L   LIPID PANEL   Result Value Ref Range    Cholesterol, total 131 100 - 199 mg/dL    Triglyceride 95 0 - 149 mg/dL    HDL Cholesterol 52 >39 mg/dL    VLDL, calculated 19 5 - 40 mg/dL    LDL, calculated 60 0 - 99 mg/dL   LIPID PANEL   Result Value Ref Range    Cholesterol, total CANCELED mg/dL    Triglyceride CANCELED     HDL Cholesterol CANCELED    SPECIMEN STATUS REPORT   Result Value Ref Range    SPECIMEN STATUS REPORT COMMENT        No results found for this visit on 06/27/17. Assessment/ Plan:     Toi Cutler was seen today for annual wellness visit. Diagnoses and all orders for this visit:    Essential hypertension  -     CBC WITH AUTOMATED DIFF  -     LIPID PANEL  -     HEMOGLOBIN A1C WITH EAG  -     METABOLIC PANEL, COMPREHENSIVE  -     VITAMIN B12  -     VITAMIN D, 25 HYDROXY  -     COLLECTION VENOUS BLOOD,VENIPUNCTURE    Primary osteoarthritis involving multiple joints    Hypercholesterolemia  -     CBC WITH AUTOMATED DIFF  -     LIPID PANEL  -     HEMOGLOBIN A1C WITH EAG  -     METABOLIC PANEL, COMPREHENSIVE  -     VITAMIN B12  -     VITAMIN D, 25 HYDROXY  -     COLLECTION VENOUS BLOOD,VENIPUNCTURE    Health care maintenance  -     VITAMIN B12  -     VITAMIN D, 25 HYDROXY  -     COLLECTION VENOUS BLOOD,VENIPUNCTURE    Encounter for Medicare annual wellness exam         1. Essential hypertension    2. Primary osteoarthritis involving multiple joints    3. Hypercholesterolemia    4. Health care maintenance    5.  Encounter for Medicare annual wellness exam        Orders Placed This Encounter    COLLECTION VENOUS BLOOD,VENIPUNCTURE    CBC WITH AUTOMATED DIFF    LIPID PANEL    HEMOGLOBIN A1C WITH EAG    METABOLIC PANEL, COMPREHENSIVE    VITAMIN B12    VITAMIN D, 25 HYDROXY         Verbal and written instructions (see AVS) provided.  Patient expresses understanding of diagnosis and treatment plan. Rainell Smoker, FNP-MADDIE          Shahriar Carroll is a 67 y.o. male and presents for annual Medicare Wellness Visit. Problem List: Reviewed with patient and discussed risk factors.     Patient Active Problem List   Diagnosis Code    Coronary atherosclerosis of native coronary artery I25.10    Other and unspecified angina pectoris I20.9    S/P CABG x 2 Z95.1    Hypertension I10    Osteoarthritis of multiple joints M15.9    Hypercholesterolemia E78.00    Varicose vein of leg I83.90    Lumbosacral spondylosis without myelopathy M47.817    Spondylolisthesis M43.10       Current medical providers:  Patient Care Team:  Radu Jones MD as PCP - General (Family Practice)  Navjot Walters MD as Consulting Provider (Cardiology)  Delores Bermeo MD (General Surgery)    PSH: Reviewed with patient  Past Surgical History:   Procedure Laterality Date    CABG, ARTERY-VEIN, TWO  1/9/12    HX APPENDECTOMY      HX CATARACT REMOVAL      bilateral    HX COLONOSCOPY  2010,     HX COLONOSCOPY  2017    hyperplastic polyp    HX ENDOSCOPY  2010    [facility], no other abnl    HX HEART CATHETERIZATION  1998, 2001    3 stents    HX HEENT      fx nose repair    HX HERNIA REPAIR      HX TONSILLECTOMY          SH: Reviewed with patient  Social History   Substance Use Topics    Smoking status: Former Smoker     Packs/day: 1.00     Years: 10.00     Types: Cigarettes     Start date: 1/1/1961     Quit date: 1/1/1971    Smokeless tobacco: Never Used    Alcohol use No       FH: Reviewed with patient  Family History   Problem Relation Age of Onset    Hypertension Father     Heart Disease Father     Cancer Brother     Heart Disease Brother     Diabetes Maternal Grandfather        Medications/Allergies: Reviewed with patient  Current Outpatient Prescriptions on File Prior to Visit   Medication Sig Dispense Refill    tamsulosin (FLOMAX) 0.4 mg capsule TAKE 2 CAPSULE BY MOUTH NIGHTLY. 60 Cap 1    melatonin 10 mg tab Take  by mouth.  CARISOPRODOL (SOMA PO) Take  by mouth. somnapure at hs as needed      IBUPROFEN/DIPHENHYDRAMINE CIT (ADVIL PM PO) Take  by mouth.  polyethylene glycol (MIRALAX) 17 gram packet Take 1 Packet by mouth daily. 15 Packet 0    OMEPRAZOLE PO Take  by mouth nightly.  METOPROLOL TARTRATE PO Take 12.5 mg by mouth two (2) times a day.  celecoxib (CELEBREX) 200 mg capsule Take 200 mg by mouth every morning. Indications: OSTEOARTHRITIS      DOCUSATE CALCIUM (STOOL SOFTENER PO) Take  by mouth daily.  multivitamin (ONE A DAY) tablet Take 1 Tab by mouth daily.  aspirin 81 mg tablet Take 81 mg by mouth.  dutaseride (AVODART) 0.5 mg capsule Take 0.5 mg by mouth Three (3) times a week.  simvastatin (ZOCOR) 20 mg tablet Take  by mouth nightly.  DULoxetine (CYMBALTA) 30 mg capsule TAKE ONE CAPSULE BY MOUTH EVERY DAY 30 Cap 0    DULoxetine (CYMBALTA) 60 mg capsule Take 1 Cap by mouth every morning. 30 Cap 3     No current facility-administered medications on file prior to visit. No Known Allergies    Objective:  Visit Vitals    /70    Pulse 85    Temp 98.4 °F (36.9 °C) (Oral)    Resp 16    Ht 6' (1.829 m)    Wt 185 lb (83.9 kg)    SpO2 96%    BMI 25.09 kg/m2    Body mass index is 25.09 kg/(m^2). Assessment of cognitive impairment: Alert and oriented x 3    Depression Screen:   PHQ over the last two weeks 12/20/2016   Little interest or pleasure in doing things More than half the days   Feeling down, depressed or hopeless More than half the days   Total Score PHQ 2 4       Fall Risk Assessment:    Fall Risk Assessment, last 12 mths 6/27/2017   Able to walk? Yes   Fall in past 12 months?  No   Fall with injury? -   Number of falls in past 12 months -   Fall Risk Score -       Functional Ability:   Does the patient exhibit a steady gait?  yes   How long did it take the patient to get up and walk from a sitting position? 3 seconds   Is the patient self reliant?  (ie can do own laundry, meals, household chores)  yes     Does the patient handle his/her own medications? yes     Does the patient handle his/her own money? yes     Is the patients home safe (ie good lighting, handrails on stairs and bath, etc.)? yes     Did you notice or did patient express any hearing difficulties? no     Did you notice or did patient express any vision difficulties?   no     Were distance and reading eye charts used? no       Advance Care Planning:   Patient was offered the opportunity to discuss advance care planning:  yes     Does patient have an Advance Directive:  no   If no, did you provide information on Caring Connections? yes       Plan:      Orders Placed This Encounter    COLLECTION VENOUS BLOOD,VENIPUNCTURE    CBC WITH AUTOMATED DIFF    LIPID PANEL    HEMOGLOBIN A1C WITH EAG    METABOLIC PANEL, COMPREHENSIVE    VITAMIN B12    VITAMIN D, 25 HYDROXY       Health Maintenance   Topic Date Due    ZOSTER VACCINE AGE 60>  11/26/1999    GLAUCOMA SCREENING Q2Y  11/26/2004    MEDICARE YEARLY EXAM  12/10/2016    INFLUENZA AGE 9 TO ADULT  08/01/2017    COLONOSCOPY  02/21/2022    DTaP/Tdap/Td series (2 - Td) 12/10/2025    Pneumococcal 65+ Low/Medium Risk  Completed       *Patient verbalized understanding and agreement with the plan. A copy of the After Visit Summary with personalized health plan was given to the patient today.       RTC in 6 months or sooner PRN  Signed SANDRA Corbin

## 2017-07-07 RX ORDER — DULOXETIN HYDROCHLORIDE 30 MG/1
CAPSULE, DELAYED RELEASE ORAL
Qty: 30 CAP | Refills: 3 | Status: SHIPPED | OUTPATIENT
Start: 2017-07-07 | End: 2017-07-24 | Stop reason: SDUPTHER

## 2017-07-24 RX ORDER — DULOXETIN HYDROCHLORIDE 30 MG/1
CAPSULE, DELAYED RELEASE ORAL
Qty: 30 CAP | Refills: 3 | Status: ON HOLD | OUTPATIENT
Start: 2017-07-24 | End: 2017-09-15

## 2017-07-28 RX ORDER — TRAMADOL HYDROCHLORIDE 50 MG/1
TABLET ORAL
Qty: 90 TAB | Refills: 0 | Status: SHIPPED | OUTPATIENT
Start: 2017-07-28 | End: 2017-09-15

## 2017-08-24 NOTE — PERIOP NOTES
Eduard Nova: patient says he had EKG  4/2017 by heart Dr. Josse Martínez ([phone]) in [place], [place]. Says he is not on any fluid pills, and had CABG 2012. He is going on vacation 8/29 - 9/9/17. His chart has not been made yet. I did not do a phone assessment, and he doesn't appear to need PAT appointment for this type surgery. Can Dr. Josse Martínez be called for EKG/heart notes before he leaves to make sure he doesn't need PAT on 8/28/17 per his request, before he goes out of town?

## 2017-08-25 NOTE — PERIOP NOTES
Notified pt heart notes and EKG received from cardiology Dr. Jennifer Masterson. Pt will not need any further testing. Will call pt the week 09/11/2017 to do phone assessment and give TOA.

## 2017-09-11 NOTE — PERIOP NOTES
Nawaf left for Lor Fernandez assistant: the faxed orders have \"SAD\" included in procedure but the posting in connect care is missing \"SAD. \"

## 2017-09-11 NOTE — PERIOP NOTES
[facility]  Ambulatory Surgery Unit  Pre-operative Instructions    Surgery/Procedure Date  9/15/17            Tentative Arrival Time:Will call 9/14/17 after 1:00pm      1. On the day of your surgery/procedure, please report to the Ambulatory Surgery Unit Registration Desk and sign in at your designated time. The Ambulatory Surgery Unit is located in [facility] on the [facility] side of the [facility] across from the [facility]. Please have all of your health insurance cards and a photo ID. 2. You must have someone with you to drive you home, as you should not drive a car for 24 hours following anesthesia. Please make arrangements for a responsible adult friend or family member to stay with you for at least the first 24 hours after your surgery. 3. Do not have anything to eat or drink (including water, gum, mints, coffee, juice) after midnight   9/14/17. This may not apply to medications prescribed by your physician. (Please note below the special instructions with medications to take the morning of surgery, if applicable.)    4. We recommend you do not drink any alcoholic beverages for 24 hours before and after your surgery. 5. Stop all Aspirin, non-steroidal anti-inflammatory drugs (i.e. Advil, Aleve), vitamins, and supplements as directed by your surgeon's office. **If you are currently taking Plavix, Coumadin, or other blood-thinning agents, contact your surgeon for instructions. **    6. In an effort to help prevent surgical site infection, we ask that you shower with an anti-bacterial soap (i.e. Dial or Safeguard) for 3 days prior to and on the morning of surgery, using a fresh towel after each shower. (Please begin this process with fresh bed linens.) Do not apply any lotions, powders, or deodorants after the shower on the day of your procedure. If applicable, please do not shave the operative site for 48 hours prior to surgery. 7. Wear comfortable clothes.  Wear glasses instead of contacts. Do not bring any jewelry or money (other than copays or fees as instructed). Do not wear make-up, particularly mascara, the morning of your surgery. Do not wear nail polish, particularly if you are having foot /hand surgery. Wear your hair loose or down, no ponytails, buns, mark pins or clips. All body piercings must be removed. 8. You should understand that if you do not follow these instructions your surgery may be cancelled. If your physical condition changes (i.e. fever, cold or flu) please contact your surgeon as soon as possible. 9. It is important that you be on time. If a situation occurs where you may be late, or if you have any questions or problems, please call [phone].    10. Your surgery time may be subject to change. You will receive a phone call the day prior to surgery to confirm your arrival time. 11. Pediatric patients: please bring a change of clothes, diapers, bottle/sippy cup, pacifier, etc.      Special Instructions: Take all medications and inhalers, as prescribed, on the morning of surgery with a sip of water EXCEPT: Aspirin, Advil per Dr. Edmar Saravia office      I understand a pre-operative phone call will be made to verify my surgery time. In the event that I am not available, I give permission for a message to be left on my answering service and/or with another person?       yes         ___________________      ___________________      _______________reviewed with patient during health assessment: he acknowledged understanding of them._  (Signature of Patient)          (Witness)                   (Date and Time)

## 2017-09-14 ENCOUNTER — ANESTHESIA EVENT (OUTPATIENT)
Dept: SURGERY | Age: 77
End: 2017-09-14
Payer: MEDICARE

## 2017-09-14 PROBLEM — M75.122 COMPLETE TEAR OF LEFT ROTATOR CUFF: Status: ACTIVE | Noted: 2017-09-14

## 2017-09-14 NOTE — H&P
PRE- OP History and Physical                             Subjective:     Patient is a 67 y.o. male presented with a history of  left shoulder pain for years, worse over the past 2 months after playing golf. Sharp stabbing pain located deltoid. Rates the pain at its worse in the last three weeks is 7/10. Pain with raising his arm, lifting, overhead, reaching, and behind the back. He states 3 months ago he had a cortisone injection which did not help. Oral antiinflammatories do not help. Better with rest and activity modification. He has had no other treatment. He has had an MRI. He is a nonsmoker, nondiabetic. .  The patient's condition has been resistant to non-operative treatment and is being admitted for surgical management of this condition.      Patient Active Problem List    Diagnosis Date Noted    Complete tear of left rotator cuff 09/14/2017    Spondylolisthesis 09/27/2016    Lumbosacral spondylosis without myelopathy     Varicose vein of leg 12/11/2014    Hypertension     Osteoarthritis of multiple joints     Hypercholesterolemia     S/P CABG x 2 01/09/2012    Coronary atherosclerosis of native coronary artery 01/06/2012    Other and unspecified angina pectoris 01/06/2012     Past Medical History:   Diagnosis Date    CAD (coronary artery disease)     3 cardiac stents   heart Dr. Gustabo Tee Cervical spondylosis without myelopathy 2009    Chronic pain     shoulder left, upper lumbar    Depressive disorder, not elsewhere classified     Diaphragmatic hernia without mention of obstruction or gangrene     GERD (gastroesophageal reflux disease)     Hypercholesterolemia     Hypertension     Hypertrophy of prostate without urinary obstruction and other lower urinary tract symptoms (LUTS) 2008    Insomnia, unspecified 2009    Lumbosacral spondylosis without myelopathy 2009    Osteoarthrosis involving, or with mention of more than one site, but not specified as generalized, multiple sites 2010    Varicose veins 12/11/2014      Past Surgical History:   Procedure Laterality Date    CABG, ARTERY-VEIN, TWO  1/9/12    CABG    HX APPENDECTOMY  1962    HX CATARACT REMOVAL  2015    bilateral    HX COLONOSCOPY  2010    x2 small beign polyps    HX COLONOSCOPY  2017    hyperplastic polyp    HX ENDOSCOPY  2010    acid reflux    HX HEART CATHETERIZATION  1998, 2001    3 stents    HX HEENT  1962    fx nose repair    HX HERNIA REPAIR  early 1980's    left inguinal, with mesh    HX ORTHOPAEDIC  2017    lumbar fusion    HX OTHER SURGICAL  7 yrs. old    Hiatal Hernia    HX TONSILLECTOMY        Prior to Admission medications    Medication Sig Start Date End Date Taking? Authorizing Provider   psyllium (METAMUCIL) powd Take  by mouth daily as needed. Yes Historical Provider   traMADol (ULTRAM) 50 mg tablet TAKE 1 TABLET BY MOUTH EVERY 8 HOURS AS NEEDED FOR PAIN. 7/28/17  Yes Rigo Fong NP   tamsulosin (FLOMAX) 0.4 mg capsule TAKE 2 CAPSULE BY MOUTH NIGHTLY. 5/5/17  Yes Rigo Fong NP   DULoxetine (CYMBALTA) 60 mg capsule Take 1 Cap by mouth every morning. Patient taking differently: Take 30 mg by mouth every morning. 12/20/16  Yes Sheldon Mejía MD   OMEPRAZOLE PO Take 20 mg by mouth nightly. Yes Historical Provider   METOPROLOL TARTRATE PO Take 12.5 mg by mouth two (2) times a day. Yes Historical Provider   multivitamin (ONE A DAY) tablet Take 1 Tab by mouth daily. Yes Historical Provider   aspirin 81 mg tablet Take 81 mg by mouth. Yes Historical Provider   dutaseride (AVODART) 0.5 mg capsule Take 0.5 mg by mouth Three (3) times a week. Yes Historical Provider   simvastatin (ZOCOR) 20 mg tablet Take  by mouth nightly. Yes Historical Provider   CARISOPRODOL (SOMA PO) Take  by mouth. somnapure at hs as needed    Historical Provider   IBUPROFEN/DIPHENHYDRAMINE CIT (ADVIL PM PO) Take  by mouth daily as needed.     Historical Provider     No Known Allergies   Social History   Substance Use Topics    Smoking status: Former Smoker     Packs/day: 1.00     Years: 10.00     Types: Cigarettes     Start date: 1/1/1961     Quit date: 1/1/1971    Smokeless tobacco: Never Used    Alcohol use No      Family History   Problem Relation Age of Onset    Hypertension Father     Heart Disease Father     Cancer Brother     Heart Disease Brother     Diabetes Maternal Grandfather       Review of Systems  A comprehensive review of systems was negative except for that written in the HPI. Objective:     Patient Vitals for the past 8 hrs:   BP Temp Pulse Resp SpO2 Weight   09/15/17 0651 157/88 97.7 °F (36.5 °C) (!) 55 18 97 % 83.7 kg (184 lb 8 oz)     Visit Vitals    /88 (BP 1 Location: Right arm, BP Patient Position: At rest)    Pulse (!) 55    Temp 97.7 °F (36.5 °C)    Resp 18    Ht 6' (1.829 m)    Wt 83.7 kg (184 lb 8 oz)    SpO2 97%    BMI 25.02 kg/m2     General:  Alert, cooperative, no distress, appears stated age. Head:  Normocephalic, without obvious abnormality, atraumatic. Eyes:  Conjunctivae/corneas clear. PERRL, EOMs intact. Ears:  Normal TMs and external ear canals both ears. Nose: Nares normal. Septum midline. Mucosa normal. No drainage or sinus tenderness. Throat: Lips, mucosa, and tongue normal. Teeth and gums normal.   Neck: Supple, symmetrical, trachea midline, no adenopathy, thyroid: no enlargement/tenderness/nodules, no carotid bruit and no JVD. Back:   Symmetric, no curvature. ROM normal. No CVA tenderness. Lungs:   Clear to auscultation bilaterally. Chest wall:  No tenderness or deformity. Heart:  Regular rate and rhythm, S1, S2, no murmur, click, rub or gallop. Abdomen:   Soft, non-tender. Bowel sounds normal. No masses,  No organomegaly.            Extremities: Extremities normal except   Range of motion 0-130, he has pain and weakness with resistance testing in forward elevation external rotation testing. Positive Childs. atraumatic, no cyanosis or edema. Pulses: 2+ and symmetric all extremities. Skin: Skin color, texture, turgor normal. No rashes or lesions   Lymph nodes: Cervical, supraclavicular, and axillary nodes normal.   Neurologic: CNII-XII intact. Neurovascular exam intact in distal extremities        Imaging Review  I ordered and interpreted AP Grashey view and outlet view of left shoulder which shows no glenohumeral osteoarthritis, and has proximal migration on AP view. I independently reviewed and interpreted both the MRI and report of left shoulder which shows a full thickness rotator cuff tear of the supraspinatus retracted 1.5 cm, mild glenohumeral osteoarthritis, possible biceps tear. There is also an unusual bulge in the back of glenohumeral joint on the axial views series 400. I texted his radiologist to review that finding.         Assessment:     Active Problems:    Complete tear of left rotator cuff (9/14/2017)        Plan:     Operative and non-operative treatments have been discussed with the patient including risks and benefits of each. Details of operative treatment were discussed, including primary risks of re-tear, and post-operative stiffness, healing time of 6 months. Risks of re-tear in smokers, diabetics, age over 61, large or chronic tears, tendon quality, noncompliance with restrictions. Rotator cuff handout given which describes post-op PT protocol and restrictions in detail. Large chronic retracted tears may be un-repairable. I discussed his success rate of 80-85%. He would like to proceed with a rotator cuff repair, possible subacromial decompression, distal claviculectomy, biceps tenodesis if needed. After consideration of risks, benefits limitations to the consented procedures and alternative options for treatment, the patient has consented to surgical interventions. Questions were answered and Pre-op teaching was completed.       Ken Chandler Sujatha Valdivia

## 2017-09-15 ENCOUNTER — ANESTHESIA (OUTPATIENT)
Dept: SURGERY | Age: 77
End: 2017-09-15
Payer: MEDICARE

## 2017-09-15 ENCOUNTER — HOSPITAL ENCOUNTER (OUTPATIENT)
Age: 77
Setting detail: OUTPATIENT SURGERY
Discharge: HOME | End: 2017-09-15
Attending: ORTHOPAEDIC SURGERY | Admitting: ORTHOPAEDIC SURGERY
Payer: MEDICARE

## 2017-09-15 VITALS
RESPIRATION RATE: 16 BRPM | OXYGEN SATURATION: 97 % | BODY MASS INDEX: 24.99 KG/M2 | DIASTOLIC BLOOD PRESSURE: 89 MMHG | TEMPERATURE: 97 F | WEIGHT: 184.5 LBS | HEIGHT: 72 IN | HEART RATE: 64 BPM | SYSTOLIC BLOOD PRESSURE: 159 MMHG

## 2017-09-15 PROCEDURE — C1713 ANCHOR/SCREW BN/BN,TIS/BN: HCPCS | Performed by: ORTHOPAEDIC SURGERY

## 2017-09-15 PROCEDURE — 77030003598 HC NDL MULT/FIRE ARTH -C: Performed by: ORTHOPAEDIC SURGERY

## 2017-09-15 PROCEDURE — 77030013079 HC BLNKT BAIR HGGR 3M -A: Performed by: NURSE ANESTHETIST, CERTIFIED REGISTERED

## 2017-09-15 PROCEDURE — 74011000250 HC RX REV CODE- 250

## 2017-09-15 PROCEDURE — 74011250636 HC RX REV CODE- 250/636: Performed by: ANESTHESIOLOGY

## 2017-09-15 PROCEDURE — 74011250636 HC RX REV CODE- 250/636

## 2017-09-15 PROCEDURE — 77030011930 HC WND ARTHRO ABLT S&N -C: Performed by: ORTHOPAEDIC SURGERY

## 2017-09-15 PROCEDURE — 76210000040 HC AMBSU PH I REC FIRST 0.5 HR: Performed by: ORTHOPAEDIC SURGERY

## 2017-09-15 PROCEDURE — 77030002916 HC SUT ETHLN J&J -A: Performed by: ORTHOPAEDIC SURGERY

## 2017-09-15 PROCEDURE — 74011250636 HC RX REV CODE- 250/636: Performed by: ORTHOPAEDIC SURGERY

## 2017-09-15 PROCEDURE — 77030004451 HC BUR SHV S&N -B: Performed by: ORTHOPAEDIC SURGERY

## 2017-09-15 PROCEDURE — 77030008684 HC TU ET CUF COVD -B: Performed by: NURSE ANESTHETIST, CERTIFIED REGISTERED

## 2017-09-15 PROCEDURE — 76030000003 HC AMB SURG OR TIME 1.5 TO 2: Performed by: ORTHOPAEDIC SURGERY

## 2017-09-15 PROCEDURE — 77030002966 HC SUT PDS J&J -A: Performed by: ORTHOPAEDIC SURGERY

## 2017-09-15 PROCEDURE — 77030013316: Performed by: ORTHOPAEDIC SURGERY

## 2017-09-15 PROCEDURE — 77030018835 HC SOL IRR LR ICUM -A: Performed by: ORTHOPAEDIC SURGERY

## 2017-09-15 PROCEDURE — 76060000063 HC AMB SURG ANES 1.5 TO 2 HR: Performed by: ORTHOPAEDIC SURGERY

## 2017-09-15 PROCEDURE — 77030020255 HC SOL INJ LR 1000ML BG: Performed by: ORTHOPAEDIC SURGERY

## 2017-09-15 PROCEDURE — 76210000057 HC AMBSU PH II REC 1 TO 1.5 HR: Performed by: ORTHOPAEDIC SURGERY

## 2017-09-15 PROCEDURE — 77030026438 HC STYL ET INTUB CARD -A: Performed by: NURSE ANESTHETIST, CERTIFIED REGISTERED

## 2017-09-15 PROCEDURE — 77030025419 HC BLD SHV ACRMNZR LG S&N -B: Performed by: ORTHOPAEDIC SURGERY

## 2017-09-15 PROCEDURE — 77030002922 HC SUT FBRWRE ARTH -B: Performed by: ORTHOPAEDIC SURGERY

## 2017-09-15 PROCEDURE — 77030008571 HC TBNG SUC IRR S&N -B: Performed by: ORTHOPAEDIC SURGERY

## 2017-09-15 DEVICE — ANCHOR SUT L19.1MM DIA5.5MM PEEK FULL THRD KNOTLESS EYELET: Type: IMPLANTABLE DEVICE | Site: SHOULDER | Status: FUNCTIONAL

## 2017-09-15 DEVICE — ANCHOR SUT L14MM DIA4.5MM PEEK W/ TWO SZ 2 FIBERWIRE CRKSCR: Type: IMPLANTABLE DEVICE | Site: SHOULDER | Status: FUNCTIONAL

## 2017-09-15 RX ORDER — MIDAZOLAM HYDROCHLORIDE 1 MG/ML
INJECTION, SOLUTION INTRAMUSCULAR; INTRAVENOUS AS NEEDED
Status: DISCONTINUED | OUTPATIENT
Start: 2017-09-15 | End: 2017-09-15 | Stop reason: HOSPADM

## 2017-09-15 RX ORDER — ROCURONIUM BROMIDE 10 MG/ML
INJECTION, SOLUTION INTRAVENOUS AS NEEDED
Status: DISCONTINUED | OUTPATIENT
Start: 2017-09-15 | End: 2017-09-15 | Stop reason: HOSPADM

## 2017-09-15 RX ORDER — SODIUM CHLORIDE 0.9 % (FLUSH) 0.9 %
5-10 SYRINGE (ML) INJECTION AS NEEDED
Status: DISCONTINUED | OUTPATIENT
Start: 2017-09-15 | End: 2017-09-15 | Stop reason: HOSPADM

## 2017-09-15 RX ORDER — SODIUM CHLORIDE 0.9 % (FLUSH) 0.9 %
5-10 SYRINGE (ML) INJECTION EVERY 8 HOURS
Status: DISCONTINUED | OUTPATIENT
Start: 2017-09-15 | End: 2017-09-15 | Stop reason: HOSPADM

## 2017-09-15 RX ORDER — CEFAZOLIN SODIUM IN 0.9 % NACL 2 G/100 ML
PLASTIC BAG, INJECTION (ML) INTRAVENOUS
Status: DISCONTINUED
Start: 2017-09-15 | End: 2017-09-15 | Stop reason: HOSPADM

## 2017-09-15 RX ORDER — LIDOCAINE HYDROCHLORIDE 10 MG/ML
0.1 INJECTION, SOLUTION EPIDURAL; INFILTRATION; INTRACAUDAL; PERINEURAL AS NEEDED
Status: DISCONTINUED | OUTPATIENT
Start: 2017-09-15 | End: 2017-09-15 | Stop reason: HOSPADM

## 2017-09-15 RX ORDER — FENTANYL CITRATE 50 UG/ML
INJECTION, SOLUTION INTRAMUSCULAR; INTRAVENOUS AS NEEDED
Status: DISCONTINUED | OUTPATIENT
Start: 2017-09-15 | End: 2017-09-15 | Stop reason: HOSPADM

## 2017-09-15 RX ORDER — ONDANSETRON 2 MG/ML
INJECTION INTRAMUSCULAR; INTRAVENOUS AS NEEDED
Status: DISCONTINUED | OUTPATIENT
Start: 2017-09-15 | End: 2017-09-15 | Stop reason: HOSPADM

## 2017-09-15 RX ORDER — ROPIVACAINE HYDROCHLORIDE 5 MG/ML
INJECTION, SOLUTION EPIDURAL; INFILTRATION; PERINEURAL
Status: DISCONTINUED
Start: 2017-09-15 | End: 2017-09-15 | Stop reason: HOSPADM

## 2017-09-15 RX ORDER — OXYCODONE AND ACETAMINOPHEN 5; 325 MG/1; MG/1
1 TABLET ORAL ONCE
Status: DISCONTINUED | OUTPATIENT
Start: 2017-09-15 | End: 2017-09-15 | Stop reason: HOSPADM

## 2017-09-15 RX ORDER — SODIUM CHLORIDE, SODIUM LACTATE, POTASSIUM CHLORIDE, CALCIUM CHLORIDE 600; 310; 30; 20 MG/100ML; MG/100ML; MG/100ML; MG/100ML
25 INJECTION, SOLUTION INTRAVENOUS CONTINUOUS
Status: DISCONTINUED | OUTPATIENT
Start: 2017-09-15 | End: 2017-09-15 | Stop reason: HOSPADM

## 2017-09-15 RX ORDER — SUCCINYLCHOLINE CHLORIDE 20 MG/ML
INJECTION INTRAMUSCULAR; INTRAVENOUS AS NEEDED
Status: DISCONTINUED | OUTPATIENT
Start: 2017-09-15 | End: 2017-09-15 | Stop reason: HOSPADM

## 2017-09-15 RX ORDER — PROPOFOL 10 MG/ML
INJECTION, EMULSION INTRAVENOUS AS NEEDED
Status: DISCONTINUED | OUTPATIENT
Start: 2017-09-15 | End: 2017-09-15 | Stop reason: HOSPADM

## 2017-09-15 RX ORDER — FENTANYL CITRATE 50 UG/ML
25 INJECTION, SOLUTION INTRAMUSCULAR; INTRAVENOUS
Status: DISCONTINUED | OUTPATIENT
Start: 2017-09-15 | End: 2017-09-15 | Stop reason: HOSPADM

## 2017-09-15 RX ORDER — PHENYLEPHRINE HCL IN 0.9% NACL 0.4MG/10ML
SYRINGE (ML) INTRAVENOUS AS NEEDED
Status: DISCONTINUED | OUTPATIENT
Start: 2017-09-15 | End: 2017-09-15 | Stop reason: HOSPADM

## 2017-09-15 RX ORDER — MIDAZOLAM HYDROCHLORIDE 1 MG/ML
INJECTION, SOLUTION INTRAMUSCULAR; INTRAVENOUS
Status: DISCONTINUED
Start: 2017-09-15 | End: 2017-09-15 | Stop reason: HOSPADM

## 2017-09-15 RX ORDER — DIPHENHYDRAMINE HYDROCHLORIDE 50 MG/ML
12.5 INJECTION, SOLUTION INTRAMUSCULAR; INTRAVENOUS AS NEEDED
Status: DISCONTINUED | OUTPATIENT
Start: 2017-09-15 | End: 2017-09-15 | Stop reason: HOSPADM

## 2017-09-15 RX ORDER — HYDROMORPHONE HYDROCHLORIDE 1 MG/ML
.2-.5 INJECTION, SOLUTION INTRAMUSCULAR; INTRAVENOUS; SUBCUTANEOUS ONCE
Status: DISCONTINUED | OUTPATIENT
Start: 2017-09-15 | End: 2017-09-15 | Stop reason: HOSPADM

## 2017-09-15 RX ORDER — LIDOCAINE HYDROCHLORIDE 20 MG/ML
INJECTION, SOLUTION EPIDURAL; INFILTRATION; INTRACAUDAL; PERINEURAL AS NEEDED
Status: DISCONTINUED | OUTPATIENT
Start: 2017-09-15 | End: 2017-09-15 | Stop reason: HOSPADM

## 2017-09-15 RX ORDER — CEFAZOLIN SODIUM IN 0.9 % NACL 2 G/100 ML
2 PLASTIC BAG, INJECTION (ML) INTRAVENOUS
Status: COMPLETED | OUTPATIENT
Start: 2017-09-15 | End: 2017-09-15

## 2017-09-15 RX ORDER — MORPHINE SULFATE 10 MG/ML
2 INJECTION, SOLUTION INTRAMUSCULAR; INTRAVENOUS
Status: DISCONTINUED | OUTPATIENT
Start: 2017-09-15 | End: 2017-09-15 | Stop reason: HOSPADM

## 2017-09-15 RX ADMIN — SUCCINYLCHOLINE CHLORIDE 140 MG: 20 INJECTION INTRAMUSCULAR; INTRAVENOUS at 08:24

## 2017-09-15 RX ADMIN — FENTANYL CITRATE 100 MCG: 50 INJECTION, SOLUTION INTRAMUSCULAR; INTRAVENOUS at 08:24

## 2017-09-15 RX ADMIN — PROPOFOL 120 MG: 10 INJECTION, EMULSION INTRAVENOUS at 08:24

## 2017-09-15 RX ADMIN — Medication 120 MCG: at 08:52

## 2017-09-15 RX ADMIN — PROPOFOL 10 MG: 10 INJECTION, EMULSION INTRAVENOUS at 09:45

## 2017-09-15 RX ADMIN — SODIUM CHLORIDE, POTASSIUM CHLORIDE, SODIUM LACTATE AND CALCIUM CHLORIDE: 600; 310; 30; 20 INJECTION, SOLUTION INTRAVENOUS at 08:05

## 2017-09-15 RX ADMIN — LIDOCAINE HYDROCHLORIDE 60 MG: 20 INJECTION, SOLUTION EPIDURAL; INFILTRATION; INTRACAUDAL; PERINEURAL at 08:24

## 2017-09-15 RX ADMIN — CEFAZOLIN 2 G: 10 INJECTION, POWDER, FOR SOLUTION INTRAVENOUS; PARENTERAL at 08:34

## 2017-09-15 RX ADMIN — ONDANSETRON 4 MG: 2 INJECTION INTRAMUSCULAR; INTRAVENOUS at 09:41

## 2017-09-15 RX ADMIN — ROCURONIUM BROMIDE 5 MG: 10 INJECTION, SOLUTION INTRAVENOUS at 08:24

## 2017-09-15 RX ADMIN — PROPOFOL 20 MG: 10 INJECTION, EMULSION INTRAVENOUS at 09:43

## 2017-09-15 RX ADMIN — LIDOCAINE HYDROCHLORIDE 40 MG: 20 INJECTION, SOLUTION EPIDURAL; INFILTRATION; INTRACAUDAL; PERINEURAL at 09:46

## 2017-09-15 RX ADMIN — MIDAZOLAM HYDROCHLORIDE 0.5 MG: 1 INJECTION, SOLUTION INTRAMUSCULAR; INTRAVENOUS at 08:20

## 2017-09-15 RX ADMIN — Medication 120 MCG: at 08:49

## 2017-09-15 RX ADMIN — MIDAZOLAM HYDROCHLORIDE 2.5 MG: 1 INJECTION, SOLUTION INTRAMUSCULAR; INTRAVENOUS at 07:42

## 2017-09-15 RX ADMIN — Medication 80 MCG: at 08:47

## 2017-09-15 NOTE — BRIEF OP NOTE
BRIEF OPERATIVE NOTE    Date of Procedure: 9/15/2017   Preoperative Diagnosis: Complete tear of left rotator cuff [M75.122]  Postoperative Diagnosis: Complete tear of left rotator cuff [M75.122]    Procedure(s):  LEFT SHOULDER ARTHROSCOPY, SUBACROMIAL DECOMPESSION, ROTATOR CUFF REPAIR AND DEBRIDEMENT OF BICEPS STUMP  Surgeon(s) and Role: Colette Marie MD - Primary       Felix Arteaga PA-C  - Assist       Surgical Staff:  Circ-1: Tommie Acharya RN  Circ-Relief: Bernadette Lowe RN  Scrub Tech-1: Kofi Felix  Scrub Tech-2: Joy Barraza  Event Time In   Incision Start 0548   Incision Close 3808     Anesthesia: General   Estimated Blood Loss: 5cc  Specimens: * No specimens in log *   Findings: see above   Complications: none  Implants:   Implant Name Type Inv.  Item Serial No.  Lot No. LRB No. Used Action   SUT [facility] CRKSCR PEEK [id number] --  - SN/A  SUT [facility] CRKSCR PEEK 4.5X16MM --  N/A [employer] [id number] Left 2 Implanted   ANCHOR SUT CLOS EYE 5.5X19.1MM -- PEEK [employer]IV[employer]OCK - SN/A   ANCHOR SUT CLOS EYE 5.5X19.1MM -- PEEK SWIVELOCK N/A [employer] [id number] Left 1 Implanted

## 2017-09-15 NOTE — IP AVS SNAPSHOT
[facility] 
[phone] Patient: Debi Fofana MRN: [id number] :1940 You are allergic to the following No active allergies Recent Documentation Height Weight BMI Smoking Status 1.829 m 83.7 kg 25.02 kg/m2 Former Smoker Emergency Contacts Name Discharge Info Relation Home Work Mobile Xuan Gentile DISCHARGE CAREGIVER [3] Spouse [3] [phone] About your hospitalization You were admitted on:  September 15, 2017 You last received care in the:  [facility] PACU You were discharged on:  September 15, 2017 Unit phone number:  [phone] Why you were hospitalized Your primary diagnosis was:  Not on File Your diagnoses also included:  Complete Tear Of Left Rotator Cuff Providers Seen During Your Hospitalizations Provider Role Specialty Primary office phone Cal Morejon MD Attending Provider Orthopedic Surgery [phone] Your Primary Care Physician (PCP) Primary Care Physician Office Phone Office Fax Eileen Bernal [phone] Follow-up Information Follow up With Details Comments Contact Info Diomedes De La Cruz [address] [facility] [address] [phone] Current Discharge Medication List  
  
CONTINUE these medications which have CHANGED Dose & Instructions Dispensing Information Comments Morning Noon Evening Bedtime DULoxetine 60 mg capsule Commonly known as:  CYMBALTA What changed:  how much to take Your last dose was: Your next dose is:    
   
   
 Dose:  60 mg Take 1 Cap by mouth every morning. Quantity:  30 Cap Refills:  3 CONTINUE these medications which have NOT CHANGED Dose & Instructions Dispensing Information Comments Morning Noon Evening Bedtime  
 aspirin 81 mg tablet Your last dose was: Your next dose is:    
   
   
 Dose:  81 mg Take 81 mg by mouth. Refills:  0  
     
   
   
   
  
 AVODART 0.5 mg capsule Generic drug:  dutasteride Your last dose was: Your next dose is:    
   
   
 Dose:  0.5 mg Take 0.5 mg by mouth Three (3) times a week. Refills:  0 METOPROLOL TARTRATE PO Your last dose was: Your next dose is:    
   
   
 Dose:  12.5 mg Take 12.5 mg by mouth two (2) times a day. Refills:  0  
     
   
   
   
  
 multivitamin tablet Commonly known as:  ONE A DAY Your last dose was: Your next dose is:    
   
   
 Dose:  1 Tab Take 1 Tab by mouth daily. Refills:  0  
     
   
   
   
  
 OMEPRAZOLE PO Your last dose was: Your next dose is:    
   
   
 Dose:  20 mg Take 20 mg by mouth nightly. Refills:  0  
     
   
   
   
  
 psyllium Powd Commonly known as:  METAMUCIL Your last dose was: Your next dose is: Take  by mouth daily as needed. Refills:  0  
     
   
   
   
  
 simvastatin 20 mg tablet Commonly known as:  ZOCOR Your last dose was: Your next dose is: Take  by mouth nightly. Refills:  0  
     
   
   
   
  
 SOMA PO Your last dose was: Your next dose is: Take  by mouth. somnapure at hs as needed Refills:  0  
     
   
   
   
  
 tamsulosin 0.4 mg capsule Commonly known as:  FLOMAX Your last dose was: Your next dose is: TAKE 2 CAPSULE BY MOUTH NIGHTLY. Quantity:  60 Cap Refills:  1 STOP taking these medications ADVIL PM PO  
   
  
 traMADol 50 mg tablet Commonly known as:  ULTRAM  
   
  
  
 
  
  
Discharge Instructions Rotator Cuff Repair Post-Op Instructions Alexa Galvan M.D. 
[phone] Sling: You will use your sling for five (5) weeks. You may remove your arm from the sling for showers. You may also remove your arm from the sling and let your arm hang down so your elbow doesn't get too stiff. You are encouraged to perform hand, wrist and elbow range of motion, arm dangles, and shoulder blade pinches at least 5 times per day. These exercises will help to decrease swelling and stiffness. I will teach your how to do dangles and shoulder blade pinches starting right after your surgery. Swelling and bruising is common after surgery. You may also notice swelling in you hand, if you do, adjust your sling so the hand is slightly above the elbow, you may squeeze a ball like a stress ball and you can do some bicep curls without weight. These exercises will help with the swelling. The essential point is that your are NOT allowed to actively lift your elbow away from your side (under its own power) for the first five (5) weeks. Dressing: Your dressing will be left in place for 1-2 days. You may notice bloody drainage on the dressing. That is fine. You will remove the dressing two (2) days after the surgery and cover the incisions with circular band-aids. Shower with band-aids on, then remove and replace band-aids after showers. Sleeping:  Patients are generally more comfortable sleeping in a reclining chair or with some pillows propped behind the shoulder. You can wear your sling when sleeping, or you may remove the sling and just slide a bed pillow up underneath your arm and sleep like that. Some difficulty with sleeping is common for 2-3 weeks after surgery. Therapy:  Arrangements will be made at your post-op appointment. Your Physical Therapist will progress your activity appropriately. Medications:  
-You should resume your daily medications unless instructed differently. 
 
-You will go home with two pain medication prescriptions.   Hydromorphone and Oxycodone. DO NOT take both at once. Pick one or the other. I recommend trying Hydromorphone first.  If you have problems with it, wait three hours and switch over to the Oxycodone. 
 
-Do not wait until you are in a lot of pain before taking the medication. It takes the medication 30-45 minutes to take effect. -DO NOT take NSAIDs (Advil, Aleve, Motrin, Ibuprofen, etc.) of the first month. NSAIDs are reported to delay tendon healing. Take Tylenol or Acetaminophen instead. No more than 2000 mg daily. 
 
-It is not uncommon to have some stomach upset with the use of narcotic medication. For this reason, take you medication with food. If your symptoms are severe, please call the office. 
 
-The use of narcotics can lead to constipation. A high fiber diet, and lots of fluids can prevent this occurring. Over the counter laxatives (milk of magnesia, dulcolax, magnesium citrate) can be used as directed. -If a refill of medication is needed, please call the office during regular business hours, Monday - Friday 8:00AM-5:00PM.  Dr. Edawrd Never may be in surgery and not readily available to sign a prescription so it is important to call at least a day before your prescription will run out. Refills will not be made after hours, so please plan ahead. We also cannot guarantee a same day prescription. Driving: DO NOT drive while taking narcotic medication. It is okay to drive in your sling, just follow same rules, no lifting elbow away from your side. Return to school/work: This will depend on your job requirements and level of activity. If you work at a desk job or in a supervisory role, you may return as early as 3-4 days after surgery. Average return to regular activities is 4-6 months post-op. Follow up: You will be given an appointment card for your follow-up appointment at our [facility] office. At that time your sutures will be removed and physical therapy will be arranged. If unexpected problems, emergencies or other issues occur and you need to speak with our office, please call. A physician is on call 24 hours a day, 7 days a week for emergencies and may be reached through the answering service by calling the regular office number [phone]. Mary Gandara M.D. 
  
 
 
[facility], [address], [address] [address] [facility] TAKE NARCOTIC PAIN MEDICATIONS WITH FOOD, and if given 2 pain narcotics do NOT take together! Narcotics tend to be constipating and we recommend taking a stool softener such as Colace or Miralax (follow package instructions). If you were given prescriptions, please review the written information on the prescribed medications. DO NOT DRIVE WHILE TAKING NARCOTIC PAIN MEDICATIONS. DISCHARGE SUMMARY from Nurse The following personal items collected during your admission are returned to you:  
Dental Appliance: Dental Appliances: None Vision: Visual Aid: Glasses Hearing Aid:   
Jewelry:   
Clothing: Clothing:  (clothing under stretcher, glasses to wife) Other Valuables:   
Valuables sent to safe:   
 
 
PATIENT INSTRUCTIONS: 
 
After General Anesthesia or Intravenous Sedation, for 24 hours or while taking prescription Narcotics: · Someone should be with you for the next 24 hours. · For your own safety, a responsible adult must drive you home. · Limit your activities · Recommended activity: Rest today, up with assistance today. Do not climb stairs or shower unattended for the next 24 hours. · Start with a soft bland diet and advance as tolerated (no nausea) to regular diet. · If you have a sore throat some things that may help are: fluids, warm salt water gargle, or throat lozenges. If this does not improve after several days please follow up with your family physician. · Do not drive and operate hazardous machinery · Do not make important personal or business decisions · Do  not drink alcoholic beverages · If you have not urinated within 8 hours after discharge, please contact your surgeon on call. Report the following to your surgeon: 
· Excessive pain, swelling, redness or odor of or around the surgical area · Temperature over 100.5 · Nausea and vomiting lasting longer than 4 hours or if unable to take medications · Any signs of decreased circulation or nerve impairment to extremity: change in color, persistent  numbness, tingling, coldness or increase pain · If you received an upper extremity nerve block, please wear your sling until the block has worn off, then refer to your surgeons post-operative instructions. If you have had a shoulder block or a block near your collar bone, you may have              symptoms such as: 1. Mild shortness of breath 2. A hoarse voice 3. Blurry vision 4. Unequal pupils 5. Drooping of your face on the same side as the nerve block. These symptoms will disappear as the nerve block wears off. · If you received a lower extremity nerve block, please use your crutches, walker, or cane until the nerve block has worn off, then refer to your surgeons post-operative instructions. ?  
· You will receive a Post Operative Call from one of the Recovery Room Nurses on the day after your surgery to check on you. It is very important for us to know how you are recovering after your surgery. If you have an issue please call your surgeon, do not wait for the post operative call. · You may receive an e-mail or letter in the mail from [facility] regarding your experience with us in the Ambulatory Surgery Unit. Your feedback is valuable to us and we appreciate your participation in the survey. ·  
· If the above instructions are not adequate, please contact Colin Dueñas RN, Dor[facility] anesthesia Nurse Manager or our Anesthesiologist, at [phone].   If you are having problems after your surgery, call your physician at his office number. ·  
· We wish youre a speedy recovery ? What to do at Home: *  Please give a list of your current medications to your Primary Care Provider. *  Please update this list whenever your medications are discontinued, doses are 
    changed, or new medications (including over-the-counter products) are added. *  Please carry medication information at all times in case of emergency situations. These are general instructions for a healthy lifestyle: No smoking/ No tobacco products/ Avoid exposure to second hand smoke Surgeon General's Warning:  Quitting smoking now greatly reduces serious risk to your health. Obesity, smoking, and sedentary lifestyle greatly increases your risk for illness A healthy diet, regular physical exercise & weight monitoring are important for maintaining a healthy lifestyle You may be retaining fluid if you have a history of heart failure or if you experience any of the following symptoms:  Weight gain of 3 pounds or more overnight or 5 pounds in a week, increased swelling in our hands or feet or shortness of breath while lying flat in bed. Please call your doctor as soon as you notice any of these symptoms; do not wait until your next office visit. Recognize signs and symptoms of STROKE: 
 
B - Balance E-  Eyes F-  Face looks uneven A-  Arms unable to move or move even S-  Speech slurred or non-existent T-  Time-call 911 as soon as signs and symptoms begin-DO NOT go Back to bed or wait to see if you get better-TIME IS BRAIN. If you have not had your influenza or pneumococcal vaccines, please follow up with your primary care physician. The discharge information has been reviewed with the patient and family. The patient and family verbalized understanding.  
 
[address] THROMBOSIS AND PULMONARY EMBOLUS 
 
SURGICAL PATIENTS 
 Surgical patients are the #1 risk for DVT and PE. WHAT IS DVT? WHAT IS PE? 
DVT is a serious condition where blood clots develop deep in the veins of the legs. PE occurs when a blood clot breaks loose from the wall of a vein and travels to the lungs blocking the pulmonary artery or one of its branches impairing blood flow from the heart, which could result in death. RISK FACTORS 
? Surgery lasting longer than 45 minutes ? History of inflammatory bowel disease 
? Oral contraceptive or hormone replacement therapy ? Immobilization ? Varicose veins / swollen legs ? Smoking  
? CHF / Acute MI / Irregular heart beat ? Family history of thrombosis ? General anesthesia greater than 2 hours ? Obesity ? Infection of less than one month ? Less than 1 month postpartum 
? COPD / Pneumonia ? Arthroscopic surgery ? Malignancy / cancer ? Spine surgery ? Blood abnormalities ? Stroke / Paralysis / Coma SIGNS AND SYMPTOMS OF DEEP VEIN TROMBOSIS Usually occurs in one leg, above or below the knee ? Swelling  one calf or thigh may be larger than the other ? Feeling increased warmth in the area of the leg that is swollen or painful ? Leg pain, which may increase when standing or walking ? Swelling along the vein of the leg ? When swollen areas is pressed with a finger, a depression may remain ? Tenderness of the leg that may be confined to one area ? Change in leg color (bluish or red) SIGNS AND SYMPTOMS OF PULMONARY EMBOLUS 
? Chest pain that gets worse with deep breath, coughing or chest movement ? Coughing up blood ? Sweating ? Shortness of breath or difficulty breathing ? Rapid heart beat ? Lightheadedness HOW TO REDUCE THE POSSIBLE RISK OF DVT ? Exercise  simple activities as rotating ankles and wrists, wiggling toes and fingers, tightening and relaxing muscles in calves and thighs promotes circulation while recovering from surgery. Please do these exercises every hour during waking hours ? KEVIN hose  If you have been given white support hose while having surgery, wear them home. You may remove them for half an hour every 8-hour period and stop wearing them 48 hours after surgery or as prescribed by your physician. KEVIN hose may be reused for air travel or extended car travel ? Take mediation as prescribed by your physician (Lovenox, Coumadin, Aspirin) ? Resume your normal activities as soon as your doctor advises you to do so. 
? Remember, when traveling, to [place] your legs frequently. Wear non skid shoes when KEVIN hose are on. They are very slippery! PATIENTS WHO BELIEVE THEY MAY BE EXPERIENCING SIGNS AND SYMPTOMS OF DVT OR PE SHOULD SEEK MEDICAL HELP IMMEDIATELY Discharge Instructions Attachments/References MEFS - HYDROMORPHONE (DILAUDID, EXALGO) - (BY MOUTH) (ENGLISH) MEFS - OXYCODONE, RAPID RELEASE (ETH-OXYDOSE, OXY IR, ROXICODONE) - (BY MOUTH) (ENGLISH) MEFS - ONDANSETRON (ZOFRAN, ZOFRAN ODT, ZUPLENZ) - (BY MOUTH, INTO THE MOUTH) (ENGLISH) Discharge Orders None ACO Transitions of Care Introducing [address] offers a voluntary care coordination program to provide high quality service and care to [facility] fee-for-service beneficiaries. Lindy Renae was designed to help you enhance your health and well-being through the following services: ? Transitions of Care  support for individuals who are transitioning from one care setting to another (example: Hospital to home). ? Chronic and Complex Care Coordination  support for individuals and caregivers of those with serious or chronic illnesses or with more than one chronic (ongoing) condition and those who take a number of different medications.   
 
 
If you meet specific medical criteria, a [address] [place] [address] Rd may call you directly to coordinate your care with your primary care physician and your other care providers. For questions about the [facility] programs, please, contact your physicians office. For general questions or additional information about Accountable Care Organizations: 
Please visit www.medicare.gov/acos. html or call 1-800-MEDICARE (8-258.951.4222) TTY users should call 8-964.357.8297. Introducing [facility] & [facility] SERVICES! [employer] introduces [employer] patient portal. Now you can access parts of your medical record, email your doctor's office, and request medication refills online. 1. In your internet browser, go to [email or web address] 2. Click on the First Time User? Click Here link in the Sign In box. You will see the New Member Sign Up page. 3. Enter your [employer] Access Code exactly as it appears below. You will not need to use this code after youve completed the sign-up process. If you do not sign up before the expiration date, you must request a new code. · [employer] Access Code: [email or web address] Expires: 9/25/2017  3:46 PM 
 
4. Enter the last four digits of your Social Security Number (xxxx) and Date of Birth (mm/dd/yyyy) as indicated and click Submit. You will be taken to the next sign-up page. 5. Create a [employer] ID. This will be your [employer] login ID and cannot be changed, so think of one that is secure and easy to remember. 6. Create a [employer] password. You can change your password at any time. 7. Enter your Password Reset Question and Answer. This can be used at a later time if you forget your password. 8. Enter your e-mail address. You will receive e-mail notification when new information is available in [address] 9. Click Sign Up. You can now view and download portions of your medical record. 10. Click the Download Summary menu link to download a portable copy of your medical information.  
 
If you have questions, please visit the Frequently Asked Questions section of [employer]. Remember, MyChart is NOT to be used for urgent needs. For medical emergencies, dial 911. Now available from your iPhone and Android! General Information Please provide this summary of care documentation to your next provider. Patient Signature:  ____________________________________________________________ Date:  ____________________________________________________________  
  
Winsome Souza Provider Signature:  ____________________________________________________________ Date:  ____________________________________________________________ More Information Hydromorphone (Dilaudid, Exalgo) - (By mouth) Why this medicine is used:  
Treats moderate to severe pain. This medicine is a narcotic pain reliever. Contact a nurse or doctor right away if you have: 
· Extreme weakness, shallow breathing, fast or slow heartbeat · Severe confusion, lightheadedness, dizziness, fainting · Sweating or cold, clammy skin · Anxiety, restlessness, fever, sweating, muscle spasms, twitching, seeing or hearing things that are not there · Severe constipation, stomach pain, vomiting Common side effects: · Mild constipation, nausea, diarrhea · Sleepiness, tiredness · Itching, rash © 2017 [facility] Information is for End User's use only and may not be sold, redistributed or otherwise used for commercial purposes. Oxycodone, Rapid Release (ETH-Oxydose, Oxy IR, Roxicodone) - (By mouth) Why this medicine is used:  
Treats moderate to severe pain. This medicine is a narcotic pain reliever. Contact a nurse or doctor right away if you have: 
· Fast or slow heart beat, shallow breathing, blue lips, skin or fingernails · Anxiety, restlessness, fever, sweating, muscle spasms, twitching, seeing or hearing things that are not there · Extreme weakness, shallow breathing, slow heartbeat · Severe confusion, lightheadedness, dizziness, fainting · Sweating or cold, clammy skin, seizures · Severe constipation, stomach pain, nausea, vomiting Common side effects: · Mild constipation · Sleepiness, tiredness © 2017 [address] Information is for End User's use only and may not be sold, redistributed or otherwise used for commercial purposes. Ondansetron (Zofran, Zofran ODT, Zuplenz) - (By mouth, Into the mouth) Why this medicine is used:  
Prevents nausea and vomiting. Contact a nurse or doctor right away if you have: 
· Fast, pounding, or uneven heartbeat · Lightheadedness or fainting · Trouble breathing Common side effects: 
· Headache, tiredness · Constipation, diarrhea © 2017 [address]  Information is for End User's use only and may not be sold, redistributed or otherwise used for commercial purposes.

## 2017-09-15 NOTE — ANESTHESIA POSTPROCEDURE EVALUATION
Post-Anesthesia Evaluation and Assessment    Patient: Tonja Acuna MRN: [id number]  SSN: [id number]    YOB: 1940  Age: 67 y.o. Sex: male       Cardiovascular Function/Vital Signs  Visit Vitals    BP (!) 147/98    Pulse 69    Temp 36.1 °C (97 °F)    Resp 16    Ht 6' (1.829 m)    Wt 83.7 kg (184 lb 8 oz)    SpO2 100%    BMI 25.02 kg/m2       Patient is status post general, regional anesthesia for Procedure(s):  LEFT SHOULDER ARTHROSCOPY, SUBACROMIAL DECOMPESSION, ROTATOR CUFF REPAIR AND DEBRIDEMENT OF BICEPS STUMP. Nausea/Vomiting: None    Postoperative hydration reviewed and adequate. Pain:  Pain Scale 1: Numeric (0 - 10) (09/15/17 1000)  Pain Intensity 1: 0 (09/15/17 1000)   Managed. Pt has left interscalene block. Sling for postop. Neurological Status:   Neuro (WDL): Exceptions to Annmarie Torres (09/15/17 0956)  Neuro  Neurologic State: Eyes open to voice (09/15/17 1000)  LUE Motor Response: Flaccid (block) (09/15/17 1000)  LLE Motor Response: Spontaneous  (09/15/17 1000)  RUE Motor Response: Spontaneous  (09/15/17 1000)  RLE Motor Response: Spontaneous  (09/15/17 1000)   At baseline    Mental Status and Level of Consciousness: Arousable    Pulmonary Status:   O2 Device: Room air (09/15/17 1015)   Adequate oxygenation and airway patent    Complications related to anesthesia: None    Post-anesthesia assessment completed.  No concerns    Signed By: Dawn Hayden MD     September 15, 2017

## 2017-09-15 NOTE — PERIOP NOTES
Nicky Mays 1940  [id number]    Situation:  Verbal report given from: José Graham RN and JEFFERY  Procedure: Procedure(s):  LEFT SHOULDER ARTHROSCOPY, SUBACROMIAL DECOMPESSION, ROTATOR CUFF REPAIR AND DEBRIDEMENT OF BICEPS STUMP    Background:    Preoperative diagnosis: Complete tear of left rotator cuff [M75.122]    Postoperative diagnosis: Complete tear of left rotator cuff [M75.122]    :  Dr. Jair Gomez    Assistant(s): Circ-1: Kassandra Opitz, RN  Circ-Relief: Sho Giles RN  Scrub Tech-1: Keyon Valente  Scrub Tech-2: Sharita Campa    Specimens: * No specimens in log *    Assessment:  Intra-procedure medications         Anesthesia gave intra-procedure sedation and medications, see anesthesia flow sheet     Intravenous fluids: LR@ KVO     Vital signs stable.  Pt. Very groggy but breathing well on own      Recommendation:    Permission to share finding with family or friend yes

## 2017-09-15 NOTE — OP NOTES
[address], [address]   OP NOTE       Name:  Xena Babcock   MR#:  [id number]   :  1940   Account #:  [de-identified]    Surgery Date:  09/15/2017   Date of Adm:  09/15/2017       PREOPERATIVE DIAGNOSIS: Left shoulder rotator cuff tear,   supraspinatus, partial tear subscapularis, long head biceps rupture,   outlet impingement and subacromial bursitis. POSTOPERATIVE DIAGNOSIS: Left shoulder rotator cuff tear,   supraspinatus, partial tear subscapularis, long head biceps rupture,   outlet impingement and subacromial bursitis. PROCEDURES PERFORMED   1. Left shoulder arthroscopic rotator cuff repair. 2. Arthroscopic extensive debridement of biceps tendon stump and   subacromial bursa. 3. Arthroscopic subacromial decompression. SURGEON: Erica Rahman. Jefe Marvin MD    ASSISTANT: PATRICK Olmos    ANESTHESIA: General.    COMPLICATIONS: None. ESTIMATED BLOOD LOSS: 5 mL. SPECIMENS REMOVED: none    INDICATIONS: This patient a traumatic tear of the rotator cuff and   comes in for repair. This is a complex surgical procedure requiring an   assistant and one was used. DESCRIPTION OF PROCEDURE: The patient was brought to the   operating theater, given airway, IV antibiotics, preoperative   interscalene block, rolled over in the left side up lateral position,   beanbag exsufflated, down-side axillary roll placed, down-side   peroneal nerve padded, neck placed in neutral extension position on a   folded pillow. The left shoulder prepped and draped in 10 pounds of   traction, 30 degrees of abduction, 20 degrees of forward flexion. Established the anterior and posterior arthroscopic portals, viewed the   joint's entirety. Glenohumeral joint showed a small area of   osteoarthritis at the bare spot at the center of the glenoid. The humeral   head were relatively preserved.  The subscapularis tendon has a partial   tear, but the biceps is absent. The biceps stump was debrided   extensively through a separate fascial incision anteriorly until gone. Went in the subacromial space and did a thorough bursectomy. The   patient has a complex rotator cuff tear, and it is essentially an L-  shaped tear off the front of the supraspinatus. The tear encompasses   the entire width of the supraspinatus, and then goes posteriorly along   the  junction, all the way back to the glenoid. It is also in 2 layers,   with the top layer being torn in an L-shaped fashion up the front of the   supraspinatus, and the undersurface being torn in an L-shaped fashion   off the back of the supraspinatus. We roughened up the footprint, did   releases, extensively debriding capsule above and below, staying   away from the spinal glenoid notch, and never going more than 1 cm   medial to the glenoid rim to avoid damage to the nerve, until the cuff   was fully flexible. Then put in anchors, 2 anchors in the medial part of   the footprint, and 4 simple sutures placed through the tongue of the   supraspinatus, then reinforcing front and back, side-to-side sutures   were placed. All were tied down and reinforced with a SwiveLock   laterally. Final pictures were taken. The patient has outlet   impingement, as evidenced by scuffing on the undersurface of the   coracoacromial arch. We did an arthroscopic subacromial   decompression using cutting block technique, removing more than 1   cm of bone, creating a type 1 acromion. Took pictures, copiously   irrigated, closed the portals, took the patient to the recovery room in   stable condition.         Izzy Bolton MD [facility]   D:  09/15/2017   12:12   T:  09/15/2017   14:40   Job #:  [id number]

## 2017-09-15 NOTE — DISCHARGE INSTRUCTIONS
Rotator Cuff Repair  Post-Op Instructions  Alexa Taylor M.D.  [phone]    Sling: You will use your sling for five (5) weeks. You may remove your arm from the sling for showers. You may also remove your arm from the sling and let your arm hang down so your elbow doesn't get too stiff. You are encouraged to perform hand, wrist and elbow range of motion, arm dangles, and shoulder blade pinches at least 5 times per day. These exercises will help to decrease swelling and stiffness. I will teach your how to do dangles and shoulder blade pinches starting right after your surgery. Swelling and bruising is common after surgery. You may also notice swelling in you hand, if you do, adjust your sling so the hand is slightly above the elbow, you may squeeze a ball like a stress ball and you can do some bicep curls without weight. These exercises will help with the swelling. The essential point is that your are NOT allowed to actively lift your elbow away from your side (under its own power) for the first five (5) weeks. Dressing: Your dressing will be left in place for 1-2 days. You may notice bloody drainage on the dressing. That is fine. You will remove the dressing two (2) days after the surgery and cover the incisions with circular band-aids. Shower with band-aids on, then remove and replace band-aids after showers. Sleeping:  Patients are generally more comfortable sleeping in a reclining chair or with some pillows propped behind the shoulder. You can wear your sling when sleeping, or you may remove the sling and just slide a bed pillow up underneath your arm and sleep like that. Some difficulty with sleeping is common for 2-3 weeks after surgery. Therapy:  Arrangements will be made at your post-op appointment. Your Physical Therapist will progress your activity appropriately.     Medications:   -You should resume your daily medications unless instructed differently.    -You will go home with two pain medication prescriptions. Hydromorphone and Oxycodone. DO NOT take both at once. Pick one or the other. I recommend trying Hydromorphone first.  If you have problems with it, wait three hours and switch over to the Oxycodone.    -Do not wait until you are in a lot of pain before taking the medication. It takes the medication 30-45 minutes to take effect. -DO NOT take NSAIDs (Advil, Aleve, Motrin, Ibuprofen, etc.) of the first month. NSAIDs are reported to delay tendon healing. Take Tylenol or Acetaminophen instead. No more than 2000 mg daily.    -It is not uncommon to have some stomach upset with the use of narcotic medication. For this reason, take you medication with food. If your symptoms are severe, please call the office.    -The use of narcotics can lead to constipation. A high fiber diet, and lots of fluids can prevent this occurring. Over the counter laxatives (milk of magnesia, dulcolax, magnesium citrate) can be used as directed. -If a refill of medication is needed, please call the office during regular business hours, Monday - Friday 8:00AM-5:00PM.  Dr. Baldo Reyes may be in surgery and not readily available to sign a prescription so it is important to call at least a day before your prescription will run out. Refills will not be made after hours, so please plan ahead. We also cannot guarantee a same day prescription. Driving: DO NOT drive while taking narcotic medication. It is okay to drive in your sling, just follow same rules, no lifting elbow away from your side. Return to school/work: This will depend on your job requirements and level of activity. If you work at a desk job or in a supervisory role, you may return as early as 3-4 days after surgery. Average return to regular activities is 4-6 months post-op. Follow up: You will be given an appointment card for your follow-up appointment at our [facility] office.  At that time your sutures will be removed and physical therapy will be arranged. If unexpected problems, emergencies or other issues occur and you need to speak with our office, please call. A physician is on call 24 hours a day, 7 days a week for emergencies and may be reached through the answering service by calling the regular office number [phone]. Tucker Kirby M.D.         Angelina Smith, [address], [address] [address] [facility] TAKE NARCOTIC PAIN MEDICATIONS WITH FOOD, and if given 2 pain narcotics do NOT take together! Narcotics tend to be constipating and we recommend taking a stool softener such as Colace or Miralax (follow package instructions). If you were given prescriptions, please review the written information on the prescribed medications. DO NOT DRIVE WHILE TAKING NARCOTIC PAIN MEDICATIONS. DISCHARGE SUMMARY from Nurse    The following personal items collected during your admission are returned to you:   Dental Appliance: Dental Appliances: None  Vision: Visual Aid: Glasses  Hearing Aid:    Jewelry:    Clothing: Clothing:  (clothing under stretcher, glasses to wife)  Other Valuables:    Valuables sent to safe:        PATIENT INSTRUCTIONS:    After General Anesthesia or Intravenous Sedation, for 24 hours or while taking prescription Narcotics:  · Someone should be with you for the next 24 hours. · For your own safety, a responsible adult must drive you home. · Limit your activities  · Recommended activity: Rest today, up with assistance today. Do not climb stairs or shower unattended for the next 24 hours. · Start with a soft bland diet and advance as tolerated (no nausea) to regular diet. · If you have a sore throat some things that may help are: fluids, warm salt water gargle, or throat lozenges. If this does not improve after several days please follow up with your family physician.   · Do not drive and operate hazardous machinery  · Do not make important personal or business decisions  · Do  not drink alcoholic beverages  · If you have not urinated within 8 hours after discharge, please contact your surgeon on call. Report the following to your surgeon:  · Excessive pain, swelling, redness or odor of or around the surgical area  · Temperature over 100.5  · Nausea and vomiting lasting longer than 4 hours or if unable to take medications  · Any signs of decreased circulation or nerve impairment to extremity: change in color, persistent  numbness, tingling, coldness or increase pain    · If you received an upper extremity nerve block, please wear your sling until the block has worn off, then refer to your surgeons post-operative instructions. If you have had a shoulder block or a block near your collar bone, you may have              symptoms such as:          1. Mild shortness of breath        2. A hoarse voice        3. Blurry vision        4. Unequal pupils        5. Drooping of your face on the same side as the nerve block. These symptoms will disappear as the nerve block wears off. · If you received a lower extremity nerve block, please use your crutches, walker, or cane until the nerve block has worn off, then refer to your surgeons post-operative instructions.    · You will receive a Post Operative Call from one of the Recovery Room Nurses on the day after your surgery to check on you. It is very important for us to know how you are recovering after your surgery. If you have an issue please call your surgeon, do not wait for the post operative call. · You may receive an e-mail or letter in the mail from [place] regarding your experience with us in the Ambulatory Surgery Unit. Your feedback is valuable to us and we appreciate your participation in the survey. ·   · If the above instructions are not adequate, please contact Shantanu Caputo RN, [facility] anesthesia Nurse Manager or our Anesthesiologist, at [phone].   If you are having problems after your surgery, call your physician at his office number. ·   · We wish youre a speedy recovery ? What to do at Home:    *  Please give a list of your current medications to your Primary Care Provider. *  Please update this list whenever your medications are discontinued, doses are      changed, or new medications (including over-the-counter products) are added. *  Please carry medication information at all times in case of emergency situations. These are general instructions for a healthy lifestyle:    No smoking/ No tobacco products/ Avoid exposure to second hand smoke    Surgeon General's Warning:  Quitting smoking now greatly reduces serious risk to your health. Obesity, smoking, and sedentary lifestyle greatly increases your risk for illness    A healthy diet, regular physical exercise & weight monitoring are important for maintaining a healthy lifestyle    You may be retaining fluid if you have a history of heart failure or if you experience any of the following symptoms:  Weight gain of 3 pounds or more overnight or 5 pounds in a week, increased swelling in our hands or feet or shortness of breath while lying flat in bed. Please call your doctor as soon as you notice any of these symptoms; do not wait until your next office visit. Recognize signs and symptoms of STROKE:    B - Balance  E-  Eyes    F-  Face looks uneven    A-  Arms unable to move or move even    S-  Speech slurred or non-existent    T-  Time-call 911 as soon as signs and symptoms begin-DO NOT go       Back to bed or wait to see if you get better-TIME IS BRAIN. If you have not had your influenza or pneumococcal vaccines, please follow up with your primary care physician. The discharge information has been reviewed with the patient and family. The patient and family verbalized understanding.     [place]  DEEP VEIN THROMBOSIS AND PULMONARY EMBOLUS    SURGICAL PATIENTS  Surgical patients are the #1 risk for DVT and PE. WHAT IS DVT? WHAT IS PE?  DVT is a serious condition where blood clots develop deep in the veins of the legs. PE occurs when a blood clot breaks loose from the wall of a vein and travels to the lungs blocking the pulmonary artery or one of its branches impairing blood flow from the heart, which could result in death. RISK FACTORS   Surgery lasting longer than 45 minutes   History of inflammatory bowel disease   Oral contraceptive or hormone replacement therapy   Immobilization   Varicose veins / swollen legs   Smoking    CHF / Acute MI / Irregular heart beat   Family history of thrombosis   General anesthesia greater than 2 hours   Obesity   Infection of less than one month   Less than 1 month postpartum   COPD / Pneumonia   Arthroscopic surgery   Malignancy / cancer   Spine surgery   Blood abnormalities   Stroke / Paralysis / Coma    SIGNS AND SYMPTOMS OF DEEP VEIN TROMBOSIS  Usually occurs in one leg, above or below the knee   Swelling - one calf or thigh may be larger than the other   Feeling increased warmth in the area of the leg that is swollen or painful   Leg pain, which may increase when standing or walking   Swelling along the vein of the leg   When swollen areas is pressed with a finger, a depression may remain   Tenderness of the leg that may be confined to one area   Change in leg color (bluish or red)    SIGNS AND SYMPTOMS OF PULMONARY EMBOLUS   Chest pain that gets worse with deep breath, coughing or chest movement   Coughing up blood   Sweating   Shortness of breath or difficulty breathing   Rapid heart beat   Lightheadedness    HOW TO REDUCE THE POSSIBLE RISK OF DVT   Exercise - simple activities as rotating ankles and wrists, wiggling toes and fingers, tightening and relaxing muscles in calves and thighs promotes circulation while recovering from surgery.  Please do these exercises every hour during waking hours   KEVIN hose - If you have been given white support hose while having surgery, wear them home. You may remove them for half an hour every 8-hour period and stop wearing them 48 hours after surgery or as prescribed by your physician. KEVIN hose may be reused for air travel or extended car travel   Take mediation as prescribed by your physician (Lovenox, Coumadin, Aspirin)   Resume your normal activities as soon as your doctor advises you to do so.  Remember, when traveling, to [place] your legs frequently. Wear non skid shoes when KEVIN hose are on. They are very slippery!     PATIENTS WHO BELIEVE THEY MAY BE EXPERIENCING SIGNS AND SYMPTOMS OF DVT OR PE SHOULD SEEK MEDICAL HELP IMMEDIATELY

## 2017-09-15 NOTE — PERIOP NOTES
All team members present, pt on O2 at 2lpm via NC and on cardiac monitor, VSS.  0741 - after timeout completed, Dr. Leeanne Nolasco performed left interscalene block in preop using ultrasound machine to visualize. Pt on CM x3, 02 by NC at 2L, patient responsive when spoken to. Able to answer questions appropriately. Pt did receive Versed 2.5mg IV given by Dr. Leeanne Nolasco for sedation. Pt tolerated procedure well. VSS and will continue to monitor   0803 - pt remains on monitor.

## 2017-09-15 NOTE — ANESTHESIA PROCEDURE NOTES
Peripheral Block    Start time: 9/15/2017 7:39 AM  End time: 9/15/2017 7:48 AM  Performed by: Teri Neri  Authorized by: Teri Neri       Pre-procedure: Indications: at surgeon's request and post-op pain management    Preanesthetic Checklist: patient identified, risks and benefits discussed, site marked, timeout performed, anesthesia consent given and patient being monitored    Timeout Time: 07:41          Block Type:   Block Type:   Interscalene  Monitoring:  Standard ASA monitoring, responsive to questions and oxygen  Injection Technique:  Single shot  Procedures: ultrasound guided    Patient Position: supine  Prep: chlorhexidine    Location:  Interscalene  Needle Type:  Stimuplex  Needle Gauge:  22 G  Needle Localization:  Ultrasound guidance  Medication Injected:  0.5%  ropivacaine  Volume (mL):  30    Assessment:  Number of attempts:  1  Injection Assessment:  Incremental injection every 5 mL, no paresthesia, ultrasound image on chart, local visualized surrounding nerve on ultrasound, negative aspiration for blood and no intravascular symptoms  Patient tolerance:  Patient tolerated the procedure well with no immediate complications

## 2017-10-26 RX ORDER — CYCLOBENZAPRINE HCL 10 MG
TABLET ORAL
Qty: 30 TAB | Refills: 0 | Status: SHIPPED | OUTPATIENT
Start: 2017-10-26 | End: 2019-10-16

## 2017-10-27 ENCOUNTER — LAB ONLY (OUTPATIENT)
Dept: FAMILY MEDICINE CLINIC | Age: 77
End: 2017-10-27

## 2017-10-27 DIAGNOSIS — N40.1 BENIGN PROSTATIC HYPERPLASIA WITH LOWER URINARY TRACT SYMPTOMS, SYMPTOM DETAILS UNSPECIFIED: Primary | ICD-10-CM

## 2017-10-27 NOTE — MR AVS SNAPSHOT
Visit Information Date & Time Provider Department Dept. Phone Encounter #  
 10/27/2017  8:15 AM [facility] PRACT[facility] [phone] [id number] Upcoming Health Maintenance Date Due ZOSTER VACCINE AGE 60> 9/26/1999 GLAUCOMA SCREENING Q2Y 11/26/2004 INFLUENZA AGE 9 TO ADULT 8/1/2017 MEDICARE YEARLY EXAM 6/28/2018 COLONOSCOPY 2/21/2022 DTaP/Tdap/Td series (2 - Td) 12/10/2025 Allergies as of 10/27/2017  Review Complete On: 9/15/2017 By: Jud Ceballos RN No Known Allergies Current Immunizations  Reviewed on 10/21/2016 Name Date Influenza High Dose Vaccine PF 10/21/2016 Influenza Vaccine 10/27/2014 Influenza Vaccine Veronia Radha) 12/10/2015 Pneumococcal Conjugate (PCV-13) 10/21/2016, 12/7/2015 Pneumococcal Polysaccharide (PPSV-23) 8/28/2013 Tdap 12/10/2015 Not reviewed this visit You Were Diagnosed With   
  
 Codes Comments Benign prostatic hyperplasia with lower urinary tract symptoms, symptom details unspecified    -  Primary ICD-10-CM: N40.1 ICD-9-CM: 600.01 Vitals Smoking Status Former Smoker Preferred Pharmacy Pharmacy Name Phone [id number], [address] [place] [phone] Your Updated Medication List  
  
   
This list is accurate as of: 10/27/17  8:30 AM.  Always use your most recent med list.  
  
  
  
  
 aspirin 81 mg tablet Take 81 mg by mouth. AVODART 0.5 mg capsule Generic drug:  dutasteride Take 0.5 mg by mouth Three (3) times a week. cyclobenzaprine 10 mg tablet Commonly known as:  FLEXERIL  
TAKE 1 TABLET BY MOUTH THREE (3) TIMES DAILY AS NEEDED FOR MUSCLE SPASM(S). DULoxetine 60 mg capsule Commonly known as:  CYMBALTA Take 1 Cap by mouth every morning. METOPROLOL TARTRATE PO Take 12.5 mg by mouth two (2) times a day. multivitamin tablet Commonly known as:  ONE A DAY Take 1 Tab by mouth daily. OMEPRAZOLE PO Take 20 mg by mouth nightly. psyllium Powd Commonly known as:  METAMUCIL Take  by mouth daily as needed. simvastatin 20 mg tablet Commonly known as:  ZOCOR Take  by mouth nightly. SOMA PO Take  by mouth. somnapure at hs as needed  
  
 tamsulosin 0.4 mg capsule Commonly known as:  FLOMAX TAKE 2 CAPSULE BY MOUTH NIGHTLY. We Performed the Following IA COLLECTION VENOUS BLOOD,VENIPUNCTURE [id number] CPT(R)] PSA, DIAGNOSTIC (PROSTATE SPECIFIC AG) [id number] CPT(R)] Introducing [facility]! Romayne Duster introduces [facility] patient portal. Now you can access parts of your medical record, email your doctor's office, and request medication refills online. 1. In your internet browser, go to [email or web address] 2. Click on the First Time User? Click Here link in the Sign In box. You will see the New Member Sign Up page. 3. Enter your [facility] Access Code exactly as it appears below. You will not need to use this code after youve completed the sign-up process. If you do not sign up before the expiration date, you must request a new code. · [facility] Access Code: [email or web address] Expires: 1/25/2018  8:30 AM 
 
4. Enter the last four digits of your Social Security Number (xxxx) and Date of Birth (mm/dd/yyyy) as indicated and click Submit. You will be taken to the next sign-up page. 5. Create a "[employer],"t ID. This will be your [facility] login ID and cannot be changed, so think of one that is secure and easy to remember. 6. Create a [facility] password. You can change your password at any time. 7. Enter your Password Reset Question and Answer. This can be used at a later time if you forget your password. 8. Enter your e-mail address. You will receive e-mail notification when new information is available in [address] 9. Click Sign Up. You can now view and download portions of your medical record. 10. Click the Download Summary menu link to download a portable copy of your medical information. If you have questions, please visit the Frequently Asked Questions section of the [employer] website. Remember, [employer] is NOT to be used for urgent needs. For medical emergencies, dial 911. Now available from your iPhone and Android! Please provide this summary of care documentation to your next provider. Your primary care clinician is listed as Eva Borden. If you have any questions after today's visit, please call [phone].

## 2017-10-27 NOTE — LETTER
11/3/2017 9:20 AM 
 
Mr. Claribel Alba [address] [facility] [address] Dear Claribel Alba: Please find your most recent results below. Resulted Orders PSA, DIAGNOSTIC (PROSTATE SPECIFIC AG) Result Value Ref Range Prostate Specific Ag 1.5 0.0 - 4.0 ng/mL Comment:  
   Roche ECLIA methodology. According to the American Urological Association, Serum PSA should 
decrease and remain at undetectable levels after radical 
prostatectomy. The AUA defines biochemical recurrence as an initial 
PSA value 0.2 ng/mL or greater followed by a subsequent confirmatory PSA value 0.2 ng/mL or greater. Values obtained with different assay methods or kits cannot be used 
interchangeably. Results cannot be interpreted as absolute evidence 
of the presence or absence of malignant disease. Narrative Performed at:  [address]  [id number] : Susan Yanes MD, Phone:  [phone] RECOMMENDATIONS: 
None. Keep up the good work! Please call me if you have any questions: [phone] Sincerely, Favian Pollard

## 2017-10-28 LAB — PSA SERPL-MCNC: 1.5 NG/ML (ref 0–4)

## 2017-11-30 ENCOUNTER — OFFICE VISIT (OUTPATIENT)
Dept: FAMILY MEDICINE CLINIC | Age: 77
End: 2017-11-30

## 2017-11-30 VITALS
DIASTOLIC BLOOD PRESSURE: 78 MMHG | HEIGHT: 72 IN | OXYGEN SATURATION: 98 % | RESPIRATION RATE: 18 BRPM | SYSTOLIC BLOOD PRESSURE: 122 MMHG | BODY MASS INDEX: 25.35 KG/M2 | TEMPERATURE: 98.2 F | WEIGHT: 187.13 LBS | HEART RATE: 58 BPM

## 2017-11-30 DIAGNOSIS — I10 ESSENTIAL HYPERTENSION: ICD-10-CM

## 2017-11-30 DIAGNOSIS — K21.9 GASTROESOPHAGEAL REFLUX DISEASE WITHOUT ESOPHAGITIS: ICD-10-CM

## 2017-11-30 DIAGNOSIS — M15.9 PRIMARY OSTEOARTHRITIS INVOLVING MULTIPLE JOINTS: Primary | ICD-10-CM

## 2017-11-30 DIAGNOSIS — K59.01 CONSTIPATION BY DELAYED COLONIC TRANSIT: ICD-10-CM

## 2017-11-30 DIAGNOSIS — E78.00 HYPERCHOLESTEROLEMIA: ICD-10-CM

## 2017-11-30 NOTE — MR AVS SNAPSHOT
Visit Information Date & Time Provider Department Dept. Phone Encounter #  
 11/30/2017 10:00 AM Omar Silverman MD [facility] Primary Care [phone] [id number] Upcoming Health Maintenance Date Due ZOSTER VACCINE AGE 60> 9/26/1999 GLAUCOMA SCREENING Q2Y 11/26/2004 MEDICARE YEARLY EXAM 6/28/2018 COLONOSCOPY 2/21/2022 DTaP/Tdap/Td series (2 - Td) 12/10/2025 Allergies as of 11/30/2017  Review Complete On: 11/30/2017 By: Omar Silverman MD  
 No Known Allergies Current Immunizations  Reviewed on 10/21/2016 Name Date Influenza High Dose Vaccine PF 10/21/2016 Influenza Vaccine 10/27/2014 Influenza Vaccine Loletha Ringer) 12/10/2015 Pneumococcal Conjugate (PCV-13) 10/21/2016, 12/7/2015 Pneumococcal Polysaccharide (PPSV-23) 8/28/2013 Tdap 12/10/2015 Not reviewed this visit You Were Diagnosed With   
  
 Codes Comments Primary osteoarthritis involving multiple joints    -  Primary ICD-10-CM: M15.0 ICD-9-CM: 715.09 Gastroesophageal reflux disease without esophagitis     ICD-10-CM: K21.9 ICD-9-CM: 530.81 Constipation by delayed colonic transit     ICD-10-CM: K59.01 
ICD-9-CM: 564.01 Vitals BP Pulse Temp Resp Height(growth percentile) Weight(growth percentile) 122/78 (BP 1 Location: Left arm, BP Patient Position: Sitting) (!) 58 98.2 °F (36.8 °C) (Oral) 18 6' (1.829 m) 187 lb 2 oz (84.9 kg) SpO2 BMI Smoking Status 98% 25.38 kg/m2 Former Smoker Vitals History BMI and BSA Data Body Mass Index Body Surface Area  
 25.38 kg/m 2 2.08 m 2 Preferred Pharmacy Pharmacy Name Phone [id number] [address] [place] [phone] Your Updated Medication List  
  
   
This list is accurate as of: 11/30/17 11:12 AM.  Always use your most recent med list.  
  
  
  
  
 aspirin 81 mg tablet Take 81 mg by mouth. AVODART 0.5 mg capsule Generic drug:  dutasteride Take 0.5 mg by mouth Three (3) times a week. cyclobenzaprine 10 mg tablet Commonly known as:  FLEXERIL  
TAKE 1 TABLET BY MOUTH THREE (3) TIMES DAILY AS NEEDED FOR MUSCLE SPASM(S). DULoxetine 60 mg capsule Commonly known as:  CYMBALTA Take 1 Cap by mouth every morning. METOPROLOL TARTRATE PO Take 12.5 mg by mouth two (2) times a day. multivitamin tablet Commonly known as:  ONE A DAY Take 1 Tab by mouth daily. OMEPRAZOLE PO Take 20 mg by mouth nightly. psyllium Powd Commonly known as:  METAMUCIL Take  by mouth daily as needed. simvastatin 20 mg tablet Commonly known as:  ZOCOR Take  by mouth nightly. SOMA PO Take  by mouth. somnapure at hs as needed  
  
 tamsulosin 0.4 mg capsule Commonly known as:  FLOMAX TAKE 2 CAPSULE BY MOUTH NIGHTLY. Introducing [facility]! Alex Mcgregor introduces Screenie patient portal. Now you can access parts of your medical record, email your doctor's office, and request medication refills online. 1. In your internet browser, go to [email or web address] 2. Click on the First Time User? Click Here link in the Sign In box. You will see the New Member Sign Up page. 3. Enter your Screenie Access Code exactly as it appears below. You will not need to use this code after youve completed the sign-up process. If you do not sign up before the expiration date, you must request a new code. · Screenie Access Code: [email or web address] Expires: 1/25/2018  7:30 AM 
 
4. Enter the last four digits of your Social Security Number (xxxx) and Date of Birth (mm/dd/yyyy) as indicated and click Submit. You will be taken to the next sign-up page. 5. Create a Screenie ID. This will be your Screenie login ID and cannot be changed, so think of one that is secure and easy to remember. 6. Create a SENSIMED password. You can change your password at any time. 7. Enter your Password Reset Question and Answer. This can be used at a later time if you forget your password. 8. Enter your e-mail address. You will receive e-mail notification when new information is available in [address] 9. Click Sign Up. You can now view and download portions of your medical record. 10. Click the Download Summary menu link to download a portable copy of your medical information. If you have questions, please visit the Frequently Asked Questions section of the SENSIMED website. Remember, SENSIMED is NOT to be used for urgent needs. For medical emergencies, dial 911. Now available from your iPhone and Android! Please provide this summary of care documentation to your next provider. Your primary care clinician is listed as Octavia Powell. If you have any questions after today's visit, please call [phone].

## 2017-11-30 NOTE — PROGRESS NOTES
Joe Mcmahon is a 65 y.o. male who presents with the following:  Chief Complaint   Patient presents with    Hip Pain     left    GERD    Constipation    Memory Loss    Cholesterol Problem    Hypertension       Hip Pain    The history is provided by the patient (Patient has left hip pain when he is walking or climbing steps but does not occur toward the anterior area where you would normally expect hip joint pain but is right around the gluteal area between the SIJ's and the greater trochanter on the left). Associated symptoms include back pain and neck pain. Pertinent negatives include no tingling and no itching. GERD   The history is provided by the patient (Patient's GERD is well managed with omeprazole without any chest pain). Pertinent negatives include no chest pain, no abdominal pain, no headaches and no shortness of breath. Constipation    The history is provided by the patient (Patient is having intermittent problems with constipation and is not regular nor is he drinking 4-6 glasses of water a day nor is he is physically active as he used to be. ). Associated symptoms include back pain and constipation. Pertinent negatives include no abdominal pain, no dysuria, no chills and no fever. Memory Loss    The history is provided by the patient (Patient is concerned about short-term memory loss as she forgets where he puts things and sometimes has trouble remembering the exact word he wants to say but does remember it 5-10 minutes later. ). Pertinent negatives include no tingling. His past medical history is significant for hypertension. Cholesterol Problem   The history is provided by the patient (Patient's cholesterol is being managed with simvastatin without muscle pain nor weakness). Pertinent negatives include no chest pain, no abdominal pain, no headaches and no shortness of breath.    Hypertension    The history is provided by the patient (Patient's blood pressures been managed with metoprolol without any headaches nor chest pain nor shortness of breath nor edema). Associated symptoms include neck pain. Pertinent negatives include no chest pain, no orthopnea, no palpitations, no malaise/fatigue, no blurred vision, no headaches, no tinnitus, no dizziness and no shortness of breath. No Known Allergies    Current Outpatient Prescriptions   Medication Sig    cyclobenzaprine (FLEXERIL) 10 mg tablet TAKE 1 TABLET BY MOUTH THREE (3) TIMES DAILY AS NEEDED FOR MUSCLE SPASM(S).  psyllium (METAMUCIL) powd Take  by mouth daily as needed.  tamsulosin (FLOMAX) 0.4 mg capsule TAKE 2 CAPSULE BY MOUTH NIGHTLY.  CARISOPRODOL (SOMA PO) Take  by mouth. somnapure at hs as needed    DULoxetine (CYMBALTA) 60 mg capsule Take 1 Cap by mouth every morning. (Patient taking differently: Take 30 mg by mouth every morning.)    OMEPRAZOLE PO Take 20 mg by mouth nightly.  METOPROLOL TARTRATE PO Take 12.5 mg by mouth two (2) times a day.  multivitamin (ONE A DAY) tablet Take 1 Tab by mouth daily.  aspirin 81 mg tablet Take 81 mg by mouth.  dutaseride (AVODART) 0.5 mg capsule Take 0.5 mg by mouth Three (3) times a week.  simvastatin (ZOCOR) 20 mg tablet Take  by mouth nightly. No current facility-administered medications for this visit.         Past Medical History:   Diagnosis Date    CAD (coronary artery disease)     3 cardiac stents   heart Dr. Tenorio Grade Cervical spondylosis without myelopathy 2009    Chronic pain     shoulder left, upper lumbar    Depressive disorder, not elsewhere classified     Diaphragmatic hernia without mention of obstruction or gangrene     GERD (gastroesophageal reflux disease)     Hypercholesterolemia     Hypertension     Hypertrophy of prostate without urinary obstruction and other lower urinary tract symptoms (LUTS) 2008    Insomnia, unspecified 2009    Lumbosacral spondylosis without myelopathy 2009    Osteoarthrosis involving, or with mention of more than one site, but not specified as generalized, multiple sites 2010    Varicose veins 12/11/2014       Past Surgical History:   Procedure Laterality Date    CABG, ARTERY-VEIN, TWO  1/9/12    CABG    HX APPENDECTOMY  1962    HX CATARACT REMOVAL  2015    bilateral    HX COLONOSCOPY  2010    x2 small beign polyps    HX COLONOSCOPY  2017    hyperplastic polyp    HX ENDOSCOPY  2010    acid reflux    [address]    3 stents    HX HEENT  1962    fx nose repair    HX HERNIA REPAIR  early 1980's    left inguinal, with mesh    HX ORTHOPAEDIC  2017    lumbar fusion    HX OTHER SURGICAL  7 yrs. old    Hiatal Hernia    HX TONSILLECTOMY         Family History   Problem Relation Age of Onset    Hypertension Father     Heart Disease Father     Cancer Brother     Heart Disease Brother     Diabetes Maternal Grandfather        Social History     Social History    Marital status:      Spouse name: N/A    Number of children: N/A    Years of education: N/A     Social History Main Topics    Smoking status: Former Smoker     Packs/day: 1.00     Years: 10.00     Types: Cigarettes     Start date: 1/1/1961     Quit date: 1/1/1971    Smokeless tobacco: Never Used    Alcohol use No    Drug use: No    Sexual activity: Not Asked     Other Topics Concern     Service No    Blood Transfusions No    Caffeine Concern No    Occupational Exposure Yes    Hobby Hazards No    Stress Concern No    Weight Concern Yes    Special Diet No    Back Care No    Exercise Yes    Bike Helmet No    Seat Belt Yes    Self-Exams No     Social History Narrative       Review of Systems   Constitutional: Negative for chills, fever, malaise/fatigue and weight loss. HENT: Negative for congestion, hearing loss, sore throat and tinnitus. Eyes: Negative for blurred vision, pain and discharge. Respiratory: Negative for cough, shortness of breath and wheezing.     Cardiovascular: Negative for chest pain, palpitations, orthopnea, claudication and leg swelling. Gastrointestinal: Positive for constipation. Negative for abdominal pain and heartburn. Genitourinary: Negative for dysuria, frequency and urgency. Musculoskeletal: Positive for back pain, joint pain and neck pain. Negative for falls and myalgias. Skin: Negative for itching and rash. Neurological: Negative for dizziness, tingling, tremors, sensory change, speech change and headaches. Endo/Heme/Allergies: Negative for environmental allergies and polydipsia. Psychiatric/Behavioral: Positive for memory loss. Negative for depression and substance abuse. The patient is not nervous/anxious and does not have insomnia. Visit Vitals    /78 (BP 1 Location: Left arm, BP Patient Position: Sitting)    Pulse (!) 58    Temp 98.2 °F (36.8 °C) (Oral)    Resp 18    Ht 6' (1.829 m)    Wt 187 lb 2 oz (84.9 kg)    SpO2 98%    BMI 25.38 kg/m2     Physical Exam   Constitutional: He is oriented to person, place, and time and well-developed, well-nourished, and in no distress. HENT:   Head: Normocephalic and atraumatic. Nose: Nose normal.   Mouth/Throat: Oropharynx is clear and moist.   Eyes: Conjunctivae and EOM are normal. Pupils are equal, round, and reactive to light. Neck: Normal range of motion. Neck supple. No JVD present. No tracheal deviation present. No thyromegaly present. Cardiovascular: Normal rate, regular rhythm, normal heart sounds and intact distal pulses. Exam reveals no gallop and no friction rub. No murmur heard. Pulmonary/Chest: Effort normal and breath sounds normal. No respiratory distress. He has no wheezes. He has no rales. He exhibits no tenderness. Abdominal: Soft. Bowel sounds are normal. He exhibits no distension and no mass. There is no tenderness. There is no rebound and no guarding. Musculoskeletal: Normal range of motion. He exhibits no edema or tenderness.    Lymphadenopathy: He has no cervical adenopathy. Neurological: He is alert and oriented to person, place, and time. He has normal reflexes. No cranial nerve deficit. He exhibits normal muscle tone. Gait normal. Coordination normal.   Skin: Skin is warm and dry. No rash noted. No erythema. Psychiatric: Mood, memory, affect and judgment normal.   Vitals reviewed. ICD-10-CM ICD-9-CM    1. Primary osteoarthritis involving multiple joints M15.0 715.09    2. Gastroesophageal reflux disease without esophagitis K21.9 530.81    3. Constipation by delayed colonic transit K59.01 564.01    4. Essential hypertension I10 401.9    5. Hypercholesterolemia E78.00 272.0    Patient was advised to drink 4-6 glasses of water with spread out throughout the day and to take advantage of his gastrocolic reflex to retrain his rectum to have bowel movements on a regular basis. The patient was told to continue his usual medications and would have him continue the omeprazole as needed to prevent his heartburn. The patient was given stretching exercises for his left hip and told to get uses cyclobenzaprine for this and if we needed to we could add on an NSAID such as Aleve to help with the pain but that could aggravate his constipation. Patient was told to stay on his current cholesterol and blood pressure medications    No orders of the defined types were placed in this encounter.       Follow-up Disposition: Not on Klaudia Ross MD

## 2018-02-01 ENCOUNTER — TELEPHONE (OUTPATIENT)
Dept: FAMILY MEDICINE CLINIC | Age: 78
End: 2018-02-01

## 2018-02-01 NOTE — TELEPHONE ENCOUNTER
[id number] Megan Milligan for patient to be sent to ENT and for referral to be ordered per patient's previous message?

## 2018-02-02 ENCOUNTER — DOCUMENTATION ONLY (OUTPATIENT)
Dept: FAMILY MEDICINE CLINIC | Age: 78
End: 2018-02-02

## 2018-02-02 DIAGNOSIS — H91.93 DECREASED HEARING OF BOTH EARS: Primary | ICD-10-CM

## 2018-02-26 RX ORDER — TAMSULOSIN HYDROCHLORIDE 0.4 MG/1
CAPSULE ORAL
Qty: 60 CAP | Refills: 1 | Status: SHIPPED | OUTPATIENT
Start: 2018-02-26 | End: 2018-04-23 | Stop reason: SDUPTHER

## 2018-03-05 RX ORDER — DULOXETIN HYDROCHLORIDE 30 MG/1
CAPSULE, DELAYED RELEASE ORAL
Qty: 30 CAP | Refills: 3 | OUTPATIENT
Start: 2018-03-05

## 2018-03-06 NOTE — TELEPHONE ENCOUNTER
----- Message from Andreina Friend sent at 3/6/2018 10:37 AM EST -----  Regarding: Rivas/telephone  Pt is requesting an apt for medication refill. (depression). Best contact [phone].

## 2018-03-08 RX ORDER — DULOXETIN HYDROCHLORIDE 30 MG/1
CAPSULE, DELAYED RELEASE ORAL
Qty: 30 CAP | Refills: 3 | Status: SHIPPED | OUTPATIENT
Start: 2018-03-08 | End: 2018-04-03

## 2018-03-20 RX ORDER — DULOXETIN HYDROCHLORIDE 30 MG/1
CAPSULE, DELAYED RELEASE ORAL
Qty: 30 CAP | Refills: 3 | Status: SHIPPED | OUTPATIENT
Start: 2018-03-20 | End: 2018-08-30 | Stop reason: SDUPTHER

## 2018-04-03 ENCOUNTER — OFFICE VISIT (OUTPATIENT)
Dept: FAMILY MEDICINE CLINIC | Age: 78
End: 2018-04-03

## 2018-04-03 VITALS
OXYGEN SATURATION: 97 % | SYSTOLIC BLOOD PRESSURE: 130 MMHG | RESPIRATION RATE: 16 BRPM | HEIGHT: 71 IN | DIASTOLIC BLOOD PRESSURE: 68 MMHG | HEART RATE: 60 BPM | BODY MASS INDEX: 26.6 KG/M2 | WEIGHT: 190 LBS

## 2018-04-03 DIAGNOSIS — Z95.1 S/P CABG X 2: ICD-10-CM

## 2018-04-03 DIAGNOSIS — E78.00 HYPERCHOLESTEROLEMIA: Primary | ICD-10-CM

## 2018-04-03 RX ORDER — GABAPENTIN 100 MG/1
100 CAPSULE ORAL
COMMUNITY
Start: 2017-06-08 | End: 2018-10-05

## 2018-04-03 RX ORDER — ACETAMINOPHEN 500 MG
TABLET ORAL
COMMUNITY
End: 2019-01-03

## 2018-04-03 RX ORDER — IBUPROFEN 200 MG
200 TABLET ORAL
COMMUNITY
End: 2019-01-03

## 2018-04-03 RX ORDER — ROSUVASTATIN CALCIUM 10 MG/1
10 TABLET, COATED ORAL
COMMUNITY
Start: 2017-05-22 | End: 2019-07-24 | Stop reason: SDUPTHER

## 2018-04-03 RX ORDER — DICLOFENAC SODIUM 75 MG/1
75 TABLET, DELAYED RELEASE ORAL
COMMUNITY
Start: 2017-10-23 | End: 2018-10-05

## 2018-04-03 NOTE — PROGRESS NOTES
Chief Complaint   Patient presents with    Hypertension         HPI:       is a 65 y.o. male. Establishing care. Concerns are memory problems and left hip pain. Dr Augustin Robbins is his hip consultant. Previous STENTS by Henry and CABG with Daryle Caller. No angina. Presently followed by Dr Maryam Marroquin. Describes at least 3 concussions. Denies getting lost.  No financial concerns. Sometimes will forget destinations when in the car. No Known Allergies    Current Outpatient Prescriptions   Medication Sig    rosuvastatin (CRESTOR) 10 mg tablet Take 10 mg by mouth nightly.  gabapentin (NEURONTIN) 100 mg capsule Take 100 mg by mouth daily as needed.  diclofenac EC (VOLTAREN) 75 mg EC tablet Take 75 mg by mouth daily as needed.  ibuprofen (MOTRIN) 200 mg tablet Take 200 mg by mouth every eight (8) hours as needed for Pain.  acetaminophen (ACETAMINOPHEN EXTRA STRENGTH) 500 mg tablet Take  by mouth every six (6) hours as needed for Pain.  DULoxetine (CYMBALTA) 30 mg capsule TAKE ONE CAPSULE BY MOUTH EVERY DAY    cyclobenzaprine (FLEXERIL) 10 mg tablet TAKE 1 TABLET BY MOUTH THREE (3) TIMES DAILY AS NEEDED FOR MUSCLE SPASM(S).  psyllium (METAMUCIL) powd Take  by mouth daily as needed.  OMEPRAZOLE PO Take 20 mg by mouth nightly.  METOPROLOL TARTRATE PO Take 12.5 mg by mouth two (2) times a day.  multivitamin (ONE A DAY) tablet Take 1 Tab by mouth daily.  aspirin 81 mg tablet Take 81 mg by mouth.  dutaseride (AVODART) 0.5 mg capsule Take 0.5 mg by mouth Three (3) times a week.  tamsulosin (FLOMAX) 0.4 mg capsule TAKE 2 CAPSULE BY MOUTH NIGHTLY. No current facility-administered medications for this visit.         Past Medical History:   Diagnosis Date    CAD (coronary artery disease)     3 cardiac stents   heart Dr. Jabier Espinal Cervical spondylosis without myelopathy 2009    Chronic pain     shoulder left, upper lumbar    Depressive disorder, not elsewhere classified     Diaphragmatic hernia without mention of obstruction or gangrene     GERD (gastroesophageal reflux disease)     Hypercholesterolemia     Hypertension     Hypertrophy of prostate without urinary obstruction and other lower urinary tract symptoms (LUTS) 2008    Insomnia, unspecified 2009    Lumbosacral spondylosis without myelopathy 2009    Osteoarthrosis involving, or with mention of more than one site, but not specified as generalized, multiple sites 2010    Varicose veins 12/11/2014         ROS:  Denies fever, chills, cough, chest pain, SOB,  nausea, vomiting, or diarrhea. Denies wt loss, wt gain, hemoptysis, hematochezia or melena. Physical Examination:    /68 (BP 1 Location: Left arm, BP Patient Position: Sitting)  Pulse 60  Resp 16  Ht 5' 11\" (1.803 m)  Wt 190 lb (86.2 kg)  SpO2 97%  BMI 26.5 kg/m2    General: Alert and Ox3, Fluent speech  HEENT:  NC/AT, EOMI, OP: clear  Neck:  Supple, no adenopathy, JVD, mass or bruit  Chest:  Clear to Ausculation, without wheezes, rales, rubs or ronchi  Cardiac: RRR  Abdomen:  +BS, soft, nontender without palpable HSM  Extremities:  No cyanosis, clubbing or edema  Neurologic:  Ambulatory without assist, CN 2-12 grossly intact. Moves all extremities. Skin: 2 mm donut shaped lesion on the left upper lip  Lymphadenopathy: no cervical or supraclavicular nodes      ASSESSMENT AND PLAN:     1.  CAD: stable  2. OA: followed by excellent consultants  3. Memory loss:  Likely a problem with registration  4. Hearing loss:  Undergoing workup  5.   See Dr Eulalio Sigala regarding lip lesion    Orders Placed This Encounter    CBC WITH AUTOMATED DIFF     Standing Status:   Future     Standing Expiration Date:   10/31/2018    LIPID PANEL     Standing Status:   Future     Standing Expiration Date:   65/32/0789    METABOLIC PANEL, COMPREHENSIVE     Standing Status:   Future     Standing Expiration Date:   10/31/2018    TSH 3RD GENERATION     Standing Status:   Future     Standing Expiration Date:   10/31/2018    rosuvastatin (CRESTOR) 10 mg tablet     Sig: Take 10 mg by mouth nightly.  gabapentin (NEURONTIN) 100 mg capsule     Sig: Take 100 mg by mouth daily as needed.  diclofenac EC (VOLTAREN) 75 mg EC tablet     Sig: Take 75 mg by mouth daily as needed.  ibuprofen (MOTRIN) 200 mg tablet     Sig: Take 200 mg by mouth every eight (8) hours as needed for Pain.  acetaminophen (ACETAMINOPHEN EXTRA STRENGTH) 500 mg tablet     Sig: Take  by mouth every six (6) hours as needed for Pain.        Coty Silva MD, Prudence Moe

## 2018-04-03 NOTE — MR AVS SNAPSHOT
[address] [place] [address] [place] [address] [employer] [address] 
[phone] Patient: Dora Nance MRN: [id number] :1940 Visit Information Date & Time Provider Department Dept. Phone Encounter #  
 4/3/2018  1:30 PM Armando Perkins Saxon [phone] [id number] Upcoming Health Maintenance Date Due  
 GLAUCOMA SCREENING Q2Y 2004 MEDICARE YEARLY EXAM 2018 COLONOSCOPY 2022 DTaP/Tdap/Td series (2 - Td) 12/10/2025 Allergies as of 4/3/2018  Review Complete On: 4/3/2018 By: Radha Dang MD  
 No Known Allergies Current Immunizations  Reviewed on 10/21/2016 Name Date Influenza High Dose Vaccine PF 10/21/2016 Influenza Vaccine 10/27/2014 Influenza Vaccine Gail Sabevelyn) 12/10/2015 Pneumococcal Conjugate (PCV-13) 10/21/2016, 2015 Pneumococcal Polysaccharide (PPSV-23) 2013 Tdap 12/10/2015 Not reviewed this visit You Were Diagnosed With   
  
 Codes Comments Hypercholesterolemia    -  Primary ICD-10-CM: E78.00 ICD-9-CM: 272.0 S/P CABG x 2     ICD-10-CM: Z95.1 ICD-9-CM: V45.81 Vitals BP Pulse Resp Height(growth percentile) Weight(growth percentile) SpO2  
 130/68 (BP 1 Location: Left arm, BP Patient Position: Sitting) 60 16 5' 11\" (1.803 m) 190 lb (86.2 kg) 97% BMI Smoking Status 26.5 kg/m2 Former Smoker BMI and BSA Data Body Mass Index Body Surface Area  
 26.5 kg/m 2 2.08 m 2 [facility]erred Pharmacy [facility] Phone [id number] [address] [phone] Your Updated Medication List  
  
   
This list is accurate as of 4/3/18  2:46 PM.  Always use your most recent med list.  
  
  
  
  
 ACETAMINOPHEN EXTRA STRENGTH 500 mg tablet Generic drug:  acetaminophen Take  by mouth every six (6) hours as needed for Pain. aspirin 81 mg tablet Take 81 mg by mouth. AVODART 0.5 mg capsule Generic drug:  dutasteride Take 0.5 mg by mouth Three (3) times a week. cyclobenzaprine 10 mg tablet Commonly known as:  FLEXERIL  
TAKE 1 TABLET BY MOUTH THREE (3) TIMES DAILY AS NEEDED FOR MUSCLE SPASM(S). diclofenac EC 75 mg EC tablet Commonly known as:  VOLTAREN Take 75 mg by mouth daily as needed. DULoxetine 30 mg capsule Commonly known as:  CYMBALTA TAKE ONE CAPSULE BY MOUTH EVERY DAY  
  
 gabapentin 100 mg capsule Commonly known as:  NEURONTIN Take 100 mg by mouth daily as needed. ibuprofen 200 mg tablet Commonly known as:  MOTRIN Take 200 mg by mouth every eight (8) hours as needed for Pain. METOPROLOL TARTRATE PO Take 12.5 mg by mouth two (2) times a day. multivitamin tablet Commonly known as:  ONE A DAY Take 1 Tab by mouth daily. OMEPRAZOLE PO Take 20 mg by mouth nightly. psyllium Powd Commonly known as:  METAMUCIL Take  by mouth daily as needed. rosuvastatin 10 mg tablet Commonly known as:  CRESTOR Take 10 mg by mouth nightly. tamsulosin 0.4 mg capsule Commonly known as:  FLOMAX TAKE 2 CAPSULE BY MOUTH NIGHTLY. To-Do List   
 Around 05/28/2018 Lab:  CBC WITH AUTOMATED DIFF Around 05/28/2018 Lab:  LIPID PANEL Around 05/28/2018 Lab:  METABOLIC PANEL, COMPREHENSIVE Around 05/28/2018 Lab:  TSH 3RD GENERATION Patient Instructions 1. See Dr Criss Valadez to evaluate a small skin cancer just above the left upper lip 2. Make an appt for labs in late May or early June (orders are in the chart). 3.  Walk 2-3 times a week and ride 2-3 times a week. 4.  See me in 6 months. Introducing [facility]! Breeyz lFores introduces [employer]ent[employer] patient portal. Now you can access parts of your medical record, email your doctor's office, and request medication refills online. 1. In your internet browser, go to [email or web address] 2. Click on the First Time User? Click Here link in the Sign In box. You will see the New Member Sign Up page. 3. Enter your PredictionIO Access Code exactly as it appears below. You will not need to use this code after youve completed the sign-up process. If you do not sign up before the expiration date, you must request a new code. · PredictionIO Access Code: [email or web address] Expires: 7/2/2018  2:46 PM 
 
4. Enter the last four digits of your Social Security Number (xxxx) and Date of Birth (mm/dd/yyyy) as indicated and click Submit. You will be taken to the next sign-up page. 5. Create a PredictionIO ID. This will be your PredictionIO login ID and cannot be changed, so think of one that is secure and easy to remember. 6. Create a PredictionIO password. You can change your password at any time. 7. Enter your Password Reset Question and Answer. This can be used at a later time if you forget your password. 8. Enter your e-mail address. You will receive e-mail notification when new information is available in [address] 9. Click Sign Up. You can now view and download portions of your medical record. 10. Click the Download Summary menu link to download a portable copy of your medical information. If you have questions, please visit the Frequently Asked Questions section of the [employer]iction[employer] website. Remember, PredictionIO is NOT to be used for urgent needs. For medical emergencies, dial 911. Now available from your iPhone and Android! Please provide this summary of care documentation to your next provider. Your primary care clinician is listed as Yetta Riedel. If you have any questions after today's visit, please call [phone].

## 2018-04-03 NOTE — PATIENT INSTRUCTIONS
1.  See Dr Percy Rhodes to evaluate a small skin cancer just above the left upper lip  2. Make an appt for labs in late May or early June (orders are in the chart). 3.  Walk 2-3 times a week and ride 2-3 times a week. 4.  See me in 6 months.

## 2018-04-03 NOTE — PROGRESS NOTES
1. Have you been to the ER, urgent care clinic since your last visit? Hospitalized since your last visit? No    2. Have you seen or consulted any other health care providers outside of the [address] [place] [address] since your last visit? Include any pap smears or colon screening.  No

## 2018-04-23 RX ORDER — TAMSULOSIN HYDROCHLORIDE 0.4 MG/1
CAPSULE ORAL
Qty: 60 CAP | Refills: 1 | Status: SHIPPED | OUTPATIENT
Start: 2018-04-23 | End: 2018-08-27 | Stop reason: SDUPTHER

## 2018-06-11 ENCOUNTER — CLINICAL SUPPORT (OUTPATIENT)
Dept: FAMILY MEDICINE CLINIC | Age: 78
End: 2018-06-11

## 2018-06-11 DIAGNOSIS — E78.00 HYPERCHOLESTEROLEMIA: ICD-10-CM

## 2018-06-12 LAB
ALBUMIN SERPL-MCNC: 4.1 G/DL (ref 3.5–4.8)
ALBUMIN/GLOB SERPL: 2.2 {RATIO} (ref 1.2–2.2)
ALP SERPL-CCNC: 53 IU/L (ref 39–117)
ALT SERPL-CCNC: 18 IU/L (ref 0–44)
AST SERPL-CCNC: 21 IU/L (ref 0–40)
BASOPHILS # BLD AUTO: 0 X10E3/UL (ref 0–0.2)
BASOPHILS NFR BLD AUTO: 1 %
BILIRUB SERPL-MCNC: 0.4 MG/DL (ref 0–1.2)
BUN SERPL-MCNC: 16 MG/DL (ref 8–27)
BUN/CREAT SERPL: 20 (ref 10–24)
CALCIUM SERPL-MCNC: 9.1 MG/DL (ref 8.6–10.2)
CHLORIDE SERPL-SCNC: 104 MMOL/L (ref 96–106)
CHOLEST SERPL-MCNC: 115 MG/DL (ref 100–199)
CO2 SERPL-SCNC: 27 MMOL/L (ref 20–29)
CREAT SERPL-MCNC: 0.82 MG/DL (ref 0.76–1.27)
EOSINOPHIL # BLD AUTO: 0.3 X10E3/UL (ref 0–0.4)
EOSINOPHIL NFR BLD AUTO: 7 %
ERYTHROCYTE [DISTWIDTH] IN BLOOD BY AUTOMATED COUNT: 13.2 % (ref 12.3–15.4)
GFR SERPLBLD CREATININE-BSD FMLA CKD-EPI: 85 ML/MIN/1.73
GFR SERPLBLD CREATININE-BSD FMLA CKD-EPI: 98 ML/MIN/1.73
GLOBULIN SER CALC-MCNC: 1.9 G/DL (ref 1.5–4.5)
GLUCOSE SERPL-MCNC: 104 MG/DL (ref 65–99)
HCT VFR BLD AUTO: 40.2 % (ref 37.5–51)
HDLC SERPL-MCNC: 48 MG/DL
HGB BLD-MCNC: 13.5 G/DL (ref 13–17.7)
IMM GRANULOCYTES # BLD: 0 X10E3/UL (ref 0–0.1)
IMM GRANULOCYTES NFR BLD: 0 %
LDLC SERPL CALC-MCNC: 47 MG/DL (ref 0–99)
LYMPHOCYTES # BLD AUTO: 1.2 X10E3/UL (ref 0.7–3.1)
LYMPHOCYTES NFR BLD AUTO: 25 %
MCH RBC QN AUTO: 31.7 PG (ref 26.6–33)
MCHC RBC AUTO-ENTMCNC: 33.6 G/DL (ref 31.5–35.7)
MCV RBC AUTO: 94 FL (ref 79–97)
MONOCYTES # BLD AUTO: 0.3 X10E3/UL (ref 0.1–0.9)
MONOCYTES NFR BLD AUTO: 7 %
NEUTROPHILS # BLD AUTO: 2.8 X10E3/UL (ref 1.4–7)
NEUTROPHILS NFR BLD AUTO: 60 %
PLATELET # BLD AUTO: 197 X10E3/UL (ref 150–379)
POTASSIUM SERPL-SCNC: 4.4 MMOL/L (ref 3.5–5.2)
PROT SERPL-MCNC: 6 G/DL (ref 6–8.5)
RBC # BLD AUTO: 4.26 X10E6/UL (ref 4.14–5.8)
SODIUM SERPL-SCNC: 142 MMOL/L (ref 134–144)
TRIGL SERPL-MCNC: 102 MG/DL (ref 0–149)
TSH SERPL DL<=0.005 MIU/L-ACNC: 1.7 UIU/ML (ref 0.45–4.5)
VLDLC SERPL CALC-MCNC: 20 MG/DL (ref 5–40)
WBC # BLD AUTO: 4.7 X10E3/UL (ref 3.4–10.8)

## 2018-06-13 ENCOUNTER — TELEPHONE (OUTPATIENT)
Dept: FAMILY MEDICINE CLINIC | Age: 78
End: 2018-06-13

## 2018-08-27 RX ORDER — TAMSULOSIN HYDROCHLORIDE 0.4 MG/1
CAPSULE ORAL
Qty: 180 CAP | Refills: 1 | Status: SHIPPED | OUTPATIENT
Start: 2018-08-27 | End: 2018-10-05 | Stop reason: SDUPTHER

## 2018-08-30 RX ORDER — DULOXETIN HYDROCHLORIDE 30 MG/1
CAPSULE, DELAYED RELEASE ORAL
Qty: 30 CAP | Refills: 3 | Status: SHIPPED | OUTPATIENT
Start: 2018-08-30 | End: 2018-09-04 | Stop reason: SDUPTHER

## 2018-08-30 NOTE — TELEPHONE ENCOUNTER
Patient requesting refill on Duloxetine to be sent to [facility].  Last ov 4/3/18 with Dr. Romelia Paz.

## 2018-10-29 PROBLEM — H40.003 GLAUCOMA SUSPECT OF BOTH EYES: Status: ACTIVE | Noted: 2018-05-02

## 2018-10-29 PROBLEM — H18.519 FUCHS' CORNEAL DYSTROPHY: Status: ACTIVE | Noted: 2018-05-02

## 2018-10-29 PROBLEM — D31.31 CHOROIDAL NEVUS OF RIGHT EYE: Status: ACTIVE | Noted: 2018-05-02

## 2018-11-14 NOTE — PERIOP NOTES
[facility] Pre-operative Instructions Procedure Date  11/15/18          Tentative Arrival Time 8584 1. On the day of your procedure, please report to the Ambulatory Surgery Unit Registration Desk and sign in at your designated time. The Ambulatory Surgery Unit is located in [facility] on the [facility] side of the [facility] across from the [address] [place] [address]. Please have all of your health insurance cards and a photo ID. 2. You must have someone with you to drive you home as directed by your surgeon. 3. You may have a light breakfast and take normal morning medications. 4. We recommend you do not drink any alcoholic beverages for 24 hours before and after your procedure. 5. Contact your surgeons office for instructions on the following medications: non-steroidal anti-inflammatory drugs (i.e. Advil, Aleve), vitamins, and supplements. (Some surgeons will want you to stop these medications prior to surgery and others may allow you to take them) **If you are currently taking Plavix, Coumadin, Aspirin and/or other blood-thinning agents, contact your surgeon for instructions. ** Your surgeon will partner with the physician prescribing these medications to determine if it is safe to stop or if you need to continue taking. Please do not stop taking these medications without instructions from your surgeon. 6. In an effort to help prevent surgical site infection, we ask that you shower with an anti-bacterial soap (i.e. Dial or Safeguard) on the morning of your procedure. Do not apply any lotions, powders, or deodorants after showering. 7. Wear comfortable clothes. Wear glasses instead of contacts. Do not bring any jewelry or money (other than copays or fees as instructed). Do not wear make-up, particularly mascara, the morning of your procedure. Wear your hair loose or down, no ponytails, buns, mark pins or clips.  All body piercings must be removed. 8. You should understand that if you do not follow these instructions your procedure may be cancelled. If your physical condition changes (i.e. fever, cold or flu) please contact your surgeon as soon as possible. 9. It is important that you be on time. If a situation occurs where you may be late, or if you have any questions or problems, please call [phone]. 
 
10. Your procedure time may be subject to change. You will receive a phone call the day prior to confirm your arrival time. I understand a pre-operative phone call will be made to verify my procedure time. In the event that I am not available, I give permission for a message to be left on my answering service and/or with another person? yes 
 
 
 
 ___________________      ___________________      ___________________ 
(Signature of Patient)          (Witness)                   (Date and Time) Preop instructions reviewed  Pt verbalized understanding.

## 2018-11-15 ENCOUNTER — HOSPITAL ENCOUNTER (OUTPATIENT)
Age: 78
Setting detail: OUTPATIENT SURGERY
Discharge: HOME | End: 2018-11-15
Attending: PHYSICAL MEDICINE & REHABILITATION | Admitting: PHYSICAL MEDICINE & REHABILITATION
Payer: MEDICARE

## 2018-11-15 ENCOUNTER — APPOINTMENT (OUTPATIENT)
Dept: GENERAL RADIOLOGY | Age: 78
End: 2018-11-15
Attending: PHYSICAL MEDICINE & REHABILITATION
Payer: MEDICARE

## 2018-11-15 VITALS
BODY MASS INDEX: 24.79 KG/M2 | OXYGEN SATURATION: 96 % | RESPIRATION RATE: 16 BRPM | SYSTOLIC BLOOD PRESSURE: 146 MMHG | TEMPERATURE: 98.7 F | HEART RATE: 64 BPM | WEIGHT: 183 LBS | DIASTOLIC BLOOD PRESSURE: 83 MMHG | HEIGHT: 72 IN

## 2018-11-15 PROCEDURE — 76210000046 HC AMBSU PH II REC FIRST 0.5 HR: Performed by: PHYSICAL MEDICINE & REHABILITATION

## 2018-11-15 PROCEDURE — 76000 FLUOROSCOPY <1 HR PHYS/QHP: CPT

## 2018-11-15 PROCEDURE — 76030000002 HC AMB SURG OR TIME FIRST 0.: Performed by: PHYSICAL MEDICINE & REHABILITATION

## 2018-11-15 PROCEDURE — 74011000250 HC RX REV CODE- 250: Performed by: PHYSICAL MEDICINE & REHABILITATION

## 2018-11-15 PROCEDURE — 74011250636 HC RX REV CODE- 250/636: Performed by: PHYSICAL MEDICINE & REHABILITATION

## 2018-11-15 PROCEDURE — 72020 X-RAY EXAM OF SPINE 1 VIEW: CPT

## 2018-11-15 RX ORDER — LIDOCAINE HYDROCHLORIDE 20 MG/ML
5 INJECTION, SOLUTION EPIDURAL; INFILTRATION; INTRACAUDAL; PERINEURAL ONCE
Status: COMPLETED | OUTPATIENT
Start: 2018-11-15 | End: 2018-11-15

## 2018-11-15 RX ORDER — METHYLPREDNISOLONE ACETATE 40 MG/ML
80 INJECTION, SUSPENSION INTRA-ARTICULAR; INTRALESIONAL; INTRAMUSCULAR; SOFT TISSUE ONCE
Status: COMPLETED | OUTPATIENT
Start: 2018-11-15 | End: 2018-11-15

## 2018-11-15 RX ORDER — BUPIVACAINE HYDROCHLORIDE 5 MG/ML
10 INJECTION, SOLUTION EPIDURAL; INTRACAUDAL ONCE
Status: COMPLETED | OUTPATIENT
Start: 2018-11-15 | End: 2018-11-15

## 2018-11-15 NOTE — OP NOTES
Procedural Case Note 11/15/2018    (10:17 AM) Hamida Kaplan 1940   (65 y.o.) 
 
[id number] CC:  pain ROS:   Complete ROS obtained, no CP, no SOB, no N or V 
 
PMH:    
Past Medical History:  
Diagnosis Date  CAD (coronary artery disease) 3 cardiac stents   heart Dr. Kavita Garcia  Cervical spondylosis without myelopathy 2009  Chronic pain   
 shoulder left, upper lumbar  Depressive disorder, not elsewhere classified  Diaphragmatic hernia without mention of obstruction or gangrene  GERD (gastroesophageal reflux disease)  Hypercholesterolemia  Hypertension  Hypertrophy of prostate without urinary obstruction and other lower urinary tract symptoms (LUTS) 2008  Insomnia, unspecified 2009  Lumbosacral spondylosis without myelopathy 2009  Osteoarthrosis involving, or with mention of more than one site, but not specified as generalized, multiple sites 2010  Varicose veins 12/11/2014 ALLERGIES:   No Known Allergies MEDS:    
Current Facility-Administered Medications Medication Dose Route Frequency  bupivacaine (PF) (MARCAINE) 0.5 % (5 mg/mL) injection 50 mg  10 mL Epidural ONCE  
 lidocaine (PF) (XYLOCAINE) 20 mg/mL (2 %) injection 100 mg  5 mL Epidural ONCE  
 methylPREDNISolone acetate (DEPO-MEDROL) 40 mg/mL injection 80 mg  80 mg Epidural ONCE  
 iohexol (OMNIPAQUE) 180 mg iodine/mL injection 10 mL  10 mL Intra artICUlar ONCE Visit Vitals /87 (BP 1 Location: Right arm, BP Patient Position: At rest) Pulse 62 Temp 98.8 °F (37.1 °C) Resp 16 Ht 6' (1.829 m) Wt 83 kg (183 lb) SpO2 96% BMI 24.82 kg/m² PE: 
Gen: NAD Head: normocephalic Heart: RRR Lungs: CTA andrew Abd: NT, ND, soft Neuro: awake and alert Skin: intact IMPRESSION:   LS DJD Note:  The clinical status was discussed in detail with the patient. The procedure was again discussed and described in detail.   All understand and accept the planned procedure and risks; reject other forms of treatment. All questions are answered.  
 
Lis Hills MD

## 2018-11-15 NOTE — PERIOP NOTES
Permission received to review discharge instructions and discuss private health information with Laquita Gitelman (wife).

## 2018-11-15 NOTE — H&P
Procedural Case Note 11/15/2018    (11:17 AM) Bill Commons 1940   (65 y.o.) 
 
[id number] CC:  pain ROS:   Complete ROS obtained, no CP, no SOB, no N or V 
 
PMH:    
Past Medical History:  
Diagnosis Date  CAD (coronary artery disease) 3 cardiac stents   heart Dr. Nenita Petersen  Cervical spondylosis without myelopathy 2009  Chronic pain   
 shoulder left, upper lumbar  Depressive disorder, not elsewhere classified  Diaphragmatic hernia without mention of obstruction or gangrene  GERD (gastroesophageal reflux disease)  Hypercholesterolemia  Hypertension  Hypertrophy of prostate without urinary obstruction and other lower urinary tract symptoms (LUTS) 2008  Insomnia, unspecified 2009  Lumbosacral spondylosis without myelopathy 2009  Osteoarthrosis involving, or with mention of more than one site, but not specified as generalized, multiple sites 2010  Varicose veins 12/11/2014 ALLERGIES:   No Known Allergies MEDS:    
Current Facility-Administered Medications Medication Dose Route Frequency  bupivacaine (PF) (MARCAINE) 0.5 % (5 mg/mL) injection 50 mg  10 mL Epidural ONCE  
 lidocaine (PF) (XYLOCAINE) 20 mg/mL (2 %) injection 100 mg  5 mL Epidural ONCE  
 methylPREDNISolone acetate (DEPO-MEDROL) 40 mg/mL injection 80 mg  80 mg Epidural ONCE  
 iohexol (OMNIPAQUE) 180 mg iodine/mL injection 10 mL  10 mL Intra artICUlar ONCE Visit Vitals /87 (BP 1 Location: Right arm, BP Patient Position: At rest) Pulse 62 Temp 98.8 °F (37.1 °C) Resp 16 Ht 6' (1.829 m) Wt 83 kg (183 lb) SpO2 96% BMI 24.82 kg/m² PE: 
Gen: NAD Head: normocephalic Heart: RRR Lungs: CTA andrew Abd: NT, ND, soft Neuro: awake and alert Skin: intact IMPRESSION:   LS DJD Note:  The clinical status was discussed in detail with the patient. The procedure was again discussed and described in detail.   All understand and accept the planned procedure and risks; reject other forms of treatment. All questions are answered.  
 
Yobani Berry MD

## 2018-11-15 NOTE — DISCHARGE INSTRUCTIONS
Discharge Instructions    Lumbar Facet Block/Medial Branch Block    You had a lumbar facet injection today. You will probably have some numbness in your lower back area for the next 6-8 hours. The steroids will slowly become effective, reducing your pain, over the next 2 weeks. You should begin feeling better after a few days, but it may take up to 2 weeks to notice the difference. The benefit you get from your injection will last a variable amount of time, depending on the severity of your spine problem. If the results you experience are significant, but not lasting a long time, you may be a candidate for a procedure that can be longer lasting (radiofrequency ablation of the nerves innervating the facet joints).  Pain:  Most people do not have any increase in pain after this injection. However, you might experience some soreness at the site of the injection. If this happens, putting an icepack over the sore area will help.  Bandage: You have a small bandage covering the site of the injection. You may remove it when you get home.  Restrictions:  Someone should drive you home after the injection. After that, you have no restrictions. You may resume your normal level of activity. You may take a shower or bath, and you may eat normally. You should continue your current exercised and/or therapy routine.  Medications:  Continue your current medications as prescribed. If your pain decreases, you may reduce the amount of your pain medicines. If you stopped taking anticoagulants or blood-thinners before the injection, start them tomorrow. If you have diabetes, your blood sugar may be elevated for a few days. Call your primary doctor with any questions.     Call Dr. Phuong Parker at [phone] if you experience:   Fever (101 degrees Fahrenheit or greater)   Nausea or vomiting   Headache unrelieved by your normal pain medicine   Redness or swelling at the injection site that lasts more than 1 day   New numbness, tingling, weakness, or pain that you didnt have before the injection     If still having pain in 1-2 weeks, call office at [phone] for a follow up appointment. DISCHARGE SUMMARY from Nurse    The following personal items collected during your admission are returned to you:   Dental Appliance: Dental Appliances: None  Vision:    Hearing Aid:    Jewelry: Jewelry: Ring, With patient(wedding band left hand)  Clothing: Clothing: Belt(in belongings bag)  Other Valuables: Other Valuables: Eyeglasses(given to wife)  Valuables sent to safe: If you were given prescriptions, please review the written information on prescribed medications. · You will receive a Post Operative Call from one of the Recovery Room Nurses on the day after your surgery to check on you. It is very important for us to know how you are recovering after your surgery. · You may receive an e-mail or letter in the mail from [facility] regarding your experience with us in the Ambulatory Surgery Unit. Your feedback is valuable to us and we appreciate your participation in the survey. If you have not had your influenza or pneumococcal vaccines, please follow up with your primary care physician. The discharge information has been reviewed with the patient. The patient verbalized understanding.

## 2018-11-15 NOTE — PERIOP NOTES
Pt alert and oriented x4, denies pain or n/t to injection site or BLE. No drainage or bleeding noted at left lumbar site. 1145: Reviewed DC instructions with pt who verbalized understanding with no further questions. Pt able to stand and ambulate with safe/steady gait at bedside, Equal/strong BLE flexion/ext. 1150: pt transported via WC to [facility] to be DC to home with wife.

## 2018-11-15 NOTE — OP NOTES
Facet Steroid Injection Operative Report    Indications: This is a 65 y.o. male who presents with LBP. He was positive for LS DJD. The patient was admitted for surgery as conservative measures have failed. Date of Surgery: 11/15/2018    Preoperative Diagnosis: LS DJD    Postoperative Diagnosis: LS DJD    Surgeon(s) and Role:     * Sultana Payton MD - Primary     Procedure:  Procedure(s):  LEFT L2-L3, L3-L4, L4-L5 FACET JOINT INJECTION    Procedure in Detail:  After appropriate informed consent was obtained, the patient was taken to the operating suite and placed in the prone position on the operating table on appropriate padding. The LS region was prepped and draped in the usual sterile fashion. Intraoperative fluoroscopy was used to localize the LS spine. The skin was infiltrated with 2% lidocaine. An 22-g needle was advanced into the Left L2-3, L3-4, L4-5 facets under fluoroscopic guidance. Next, 2ml of 0.5% marcaine and 80mg of Depo-Medrol were injected. The needle was removed from the patient. The patient was then turned back into the supine position on the stretcher and was taken to the Recovery Room in stable condition.     Estimated Blood Loss:  none     Specimens: None       Drains: None          Complications:  None    Signed By: Silvia Schmidt MD                        November 15, 2018

## 2018-11-15 NOTE — PERIOP NOTES
Judith Ziegler 1940 
[id number] Situation: 
Verbal report given from: Colin Edmond Procedure: Procedure(s): LEFT L2-L3, L3-L4, L4-L5 FACET JOINT INJECTION Background: 
 
Preoperative diagnosis: Lumbar spondylosis [M47.816] Postoperative diagnosis: Lumbar spondylosis [M47.816] :  Dr. Rolando Torres Assessment: 
Intra-procedure medications, procedure, and allergies reviewed Recommendation: 
 
Discharge patient home after discharge instructions reviewed with patient. Rest until local has worn off.

## 2018-11-15 NOTE — PERIOP NOTES
Skin assessment: 
 WNL Skin color: WNL Skin condition  WNL Skin integrity: WNL Turgor: WNL Neuro:  Push/Pull assessment: LUE Response:  strong, equal bilaterally LLE Response: push/pull strong,equal bilaterally RUE Response:   strong, equal bilaterally RLE Response:  Push/pull strong, , equal bilaterally

## 2018-12-27 ENCOUNTER — OFFICE VISIT (OUTPATIENT)
Dept: ONCOLOGY | Age: 78
End: 2018-12-27

## 2018-12-27 VITALS
BODY MASS INDEX: 24.38 KG/M2 | TEMPERATURE: 98.1 F | SYSTOLIC BLOOD PRESSURE: 143 MMHG | HEART RATE: 56 BPM | WEIGHT: 180 LBS | DIASTOLIC BLOOD PRESSURE: 81 MMHG | OXYGEN SATURATION: 94 % | HEIGHT: 72 IN

## 2018-12-27 DIAGNOSIS — C25.0 MALIGNANT NEOPLASM OF HEAD OF PANCREAS (HCC): Primary | ICD-10-CM

## 2018-12-27 NOTE — PROGRESS NOTES
Mitra Suarez is a 77 y.o. male who presents to the office today for the following:  Chief Complaint   Patient presents with    Pancreatic Cancer       Referring Provider:  Denisa Stanton MD    HPI  76 yo with newly dx metastatic pancreatic ca--problems began several months ago with increased pain in his back and abd fullness--was found to have ascites--had paracentesis performed and positivie for pancreatic adenocarcinoma. Labs significant with very elevated 19-9. LFT's and renal function intact. Notes some pain in upper abd that is controlled with trmadol    CURRENT TREATMENT:    Past Medical History:   Diagnosis Date    CAD (coronary artery disease)     3 cardiac stents   heart Dr. Keesha Rachel Cervical spondylosis without myelopathy 2009    Chronic pain     shoulder left, upper lumbar    Depressive disorder, not elsewhere classified     Diaphragmatic hernia without mention of obstruction or gangrene     GERD (gastroesophageal reflux disease)     Hypercholesterolemia     Hypertension     Hypertrophy of prostate without urinary obstruction and other lower urinary tract symptoms (LUTS) 2008    Insomnia, unspecified 2009    Lumbosacral spondylosis without myelopathy 2009    Osteoarthrosis involving, or with mention of more than one site, but not specified as generalized, multiple sites 2010    Varicose veins 12/11/2014     Past Surgical History:   Procedure Laterality Date    CABG, ARTERY-VEIN, TWO  1/9/12    CABG    HX APPENDECTOMY  1962    HX CATARACT REMOVAL  2015    bilateral    HX COLONOSCOPY  2010    x2 small beign polyps    HX COLONOSCOPY  2017    hyperplastic polyp    HX ENDOSCOPY  2010    acid reflux    HX HEART CATHETERIZATION  1998, 2001    3 stents    HX HEENT  1962    fx nose repair    HX HERNIA REPAIR  early 1980's    left inguinal, with mesh    HX ORTHOPAEDIC  2017    lumbar fusion    HX OTHER SURGICAL  7 yrs.  old    Hiatal Hernia    HX TONSILLECTOMY Family History   Problem Relation Age of Onset    Hypertension Father     Heart Disease Father     Arthritis-osteo Mother     Cancer Brother 78        tumor in back    Heart Disease Brother     Diabetes Maternal Grandfather     Kidney Disease Maternal Grandfather      Social History     Socioeconomic History    Marital status:      Spouse name: Not on file    Number of children: Not on file    Years of education: Not on file    Highest education level: Not on file   Tobacco Use    Smoking status: Former Smoker     Packs/day: 1.00     Years: 10.00     Pack years: 10.00     Types: Cigarettes     Start date: 1961     Last attempt to quit: 1971     Years since quittin.0    Smokeless tobacco: Never Used   Substance and Sexual Activity    Alcohol use: No    Drug use: No    Sexual activity: Yes   Other Topics Concern     Service No    Blood Transfusions No    Caffeine Concern No    Occupational Exposure Yes    Hobby Hazards No    Stress Concern No    Weight Concern Yes    Special Diet No    Back Care No    Exercise Yes    Bike Helmet No    Seat Belt Yes    Self-Exams No       No flowsheet data found.]          Key Oncology Meds     Patient is on no Oncologic meds. No Known Allergies  Current Outpatient Medications   Medication Sig    polyethylene glycol (MIRALAX) 17 gram/dose powder Take 17 g by mouth daily.  traMADol (ULTRAM) 50 mg tablet Take 1 Tab by mouth every six (6) hours as needed for Pain. Max Daily Amount: 200 mg.  pregabalin (LYRICA) 100 mg capsule Take  by mouth two (2) times a day.  docusate sodium (COLACE) 100 mg capsule Take 200 mg by mouth daily.  amLODIPine (NORVASC) 5 mg tablet Take 5 mg by mouth daily.  tamsulosin (FLOMAX) 0.4 mg capsule TAKE 2 CAPSULES BY MOUTH NIGHLTY    rosuvastatin (CRESTOR) 10 mg tablet Take 10 mg by mouth nightly.     ibuprofen (MOTRIN) 200 mg tablet Take 200 mg by mouth every eight (8) hours as needed for Pain.  acetaminophen (ACETAMINOPHEN EXTRA STRENGTH) 500 mg tablet Take  by mouth every six (6) hours as needed for Pain.  cyclobenzaprine (FLEXERIL) 10 mg tablet TAKE 1 TABLET BY MOUTH THREE (3) TIMES DAILY AS NEEDED FOR MUSCLE SPASM(S).  METOPROLOL TARTRATE PO Take 12.5 mg by mouth two (2) times a day.  multivitamin (ONE A DAY) tablet Take 1 Tab by mouth daily.  aspirin 81 mg tablet Take 81 mg by mouth.  dutasteride (AVODART) 0.5 mg capsule Take 0.5 mg by mouth daily.  DULoxetine (CYMBALTA) 30 mg capsule TAKE ONE CAPSULE BY MOUTH EVERY DAY    psyllium (METAMUCIL) powd Take  by mouth daily as needed. No current facility-administered medications for this visit. Review of Systems   Constitutional: Negative. HENT: Negative. Eyes: Negative. Respiratory: Negative. Cardiovascular: Negative. Gastrointestinal: Positive for abdominal pain. Genitourinary: Negative. Musculoskeletal: Negative. Skin: Negative. Neurological: Negative. Endo/Heme/Allergies: Negative. Psychiatric/Behavioral: Negative. Physical Exam  Visit Vitals  /81   Pulse (!) 56   Temp 98.1 °F (36.7 °C) (Oral)   Ht 6' (1.829 m)   Wt 180 lb (81.6 kg)   SpO2 94%   BMI 24.41 kg/m²         Imaging Results:    XR Results (maximum last 3): Results from [place] encounter on 11/15/18   XR SPINE SNGL V (CROSS TABLE LAT)    Narrative INDICATION:  pain procedure     EXAM: Single spot fluoroscopy image of the lumbar spine    FINDINGS: Single spot fluoroscopy image of the lumbar spine was obtained during  pain procedure. Please see operative note for full details    Fluoroscopy time 13.8 seconds          Impression IMPRESSION:  1. INTERPRETATION PROVIDED FOR COMPLIANCE ONLY AT NO CHARGE     XR FLUOROSCOPY UNDER 60 MINUTES    Narrative Fluoroscopy time was provided. Impression IMPRESSION:  Fluoroscopy time was provided.      Fluoro dose:  1.95 mGy      vk   Results from Hospital Encounter encounter on 12/12/17   XR HIP LT W OR WO PELV 2-3 VWS    Narrative PELVIS AND LEFT HIP, 2 VIEWS    INDICATION:  m25.552. COMPARISON:  None. FINDINGS:    There is no acute fracture or dislocation. Mild bilateral hip joint space  narrowing. The SI joints and pubic symphysis are normal. Golden and pedicle screw  fixation hardware at L5 and S1. Interbody cage at L5-S1. Arterial calcification  is seen within the pelvis. Bone mineralization is normal.      Impression IMPRESSION:    Mild bilateral hip joint space narrowing. CT Results (maximum last 3): Results from [place] encounter on 12/11/18   CT ABD PELV W CONT    Narrative EXAM: CT ABD PELV W CONT    INDICATION: Ascites . Possible pancreatitis. COMPARISON: CT abdomen/pelvis December 4, 2018. CT abdomen/pelvis July 8, 2009. TECHNIQUE:   Following the uneventful intravenous administration of contrast, thin axial  multiphase images were obtained through the abdomen and pelvis. Coronal and  sagittal reconstructions were generated. Oral contrast was not administered. CT  dose reduction was achieved through use of a standardized protocol tailored for  this examination and automatic exposure control for dose modulation. FINDINGS:   LUNG BASES: Bibasilar linear atelectasis versus scarring. INCIDENTALLY IMAGED HEART AND MEDIASTINUM: Unremarkable. LIVER: Multiple hepatic cysts measure up to 4.1 cm. GALLBLADDER: Cholelithiasis. SPLEEN: No mass. PANCREAS: At the junction of the pancreatic head/body, there is a 2 x 2.5 x 2.8  cm hypodense mass abutting, but not encasing, the superior mesenteric vein. There is main pancreatic ductal dilation measuring up to 1.5 cm maximal  diameter. ADRENALS: Unremarkable. KIDNEYS: No mass, calculus, or hydronephrosis. STOMACH: Unremarkable. SMALL BOWEL: No dilatation or wall thickening. COLON: No dilatation or wall thickening. APPENDIX: Unremarkable.   PERITONEUM: There is mild/moderate ascites with diffuse areas of omental  nodularity most compatible with peritoneal carcinomatosis. RETROPERITONEUM: No lymphadenopathy or aortic aneurysm. URINARY BLADDER: Mild diffuse bladder wall thickening, possible chronic bladder  outlet obstruction. BONES: No destructive bone lesion. Diffuse osseous degenerative changes. Posterior fusion spanning L5-S1. Impression IMPRESSION:  1. A 2.8 x 2.5 x 2 cm mass at the pancreatic head/body junction. Associated   main pancreatic ductal dilation measuring up to 1.5 cm.  2. Mild/moderate intra-abdominal ascites with suspected peritoneal  carcinomatosis. Results from [place] encounter on 12/04/18   CT ABD PELV WO CONT    Narrative EXAM:  CT ABD PELV WO CONT    INDICATION: Abdominal pain; Hernia , periumbilical abdominal pain for 2 weeks,  question of hernia, previous left inguinal hernia repair x2, previous  appendectomy    COMPARISON: 7/8/2009    CONTRAST:  None. TECHNIQUE:   Thin axial images were obtained through the abdomen and pelvis. Coronal and  sagittal reconstructions were generated. Oral contrast was not administered. CT  dose reduction was achieved through use of a standardized protocol tailored for  this examination and automatic exposure control for dose modulation. The absence of intravenous contrast material reduces the sensitivity for  evaluation of the solid parenchymal organs of the abdomen. FINDINGS:   LUNG BASES: Linear atelectasis or scarring in the lung bases. No significant  consolidation. No pleural fluid. INCIDENTALLY IMAGED HEART AND MEDIASTINUM: Extensive coronary artery  calcification. LIVER: Normal size, density, and configuration. Several well-defined low density  lesions which likely represent cysts. Other smaller lesions difficult to  characterize but probably also cysts. Sensitivity limited without use of IV  contrast.  GALLBLADDER: Layered stones in the dependent portion.   SPLEEN: Normal size. No focal lesions. PANCREAS: 1.7 x 2.9 x 2.1 cm (2-35, 4-25) cystic lesion in the pancreatic head,  measuring fluid density. Not clearly present in 2009. ADRENALS: Unremarkable. KIDNEYS/URETERS: No mass, calculus, or hydronephrosis. STOMACH: Small to moderate-sized hiatus hernia. Otherwise unremarkable. SMALL BOWEL: No dilatation or wall thickening. COLON: No dilatation or wall thickening. APPENDIX: Surgically absent. PERITONEUM: Moderate ascites in the abdomen and pelvis. No focal fluid  collections. Thickening of the greater omentum suggesting infiltrating  carcinoma. RETROPERITONEUM: Atherosclerotic aorta without aneurysm. No retroperitoneal  adenopathy. REPRODUCTIVE ORGANS: Unremarkable. URINARY BLADDER: No mass or calculus. BONES: Degenerative changes in the lumbar spine. Postsurgical changes at L5-S1. ADDITIONAL COMMENTS: N/A. No ventral hernia. Impression IMPRESSION:    1. Moderate ascites in the abdomen and pelvis. Thickening of the greater omentum  suggesting carcinomatous infiltration. 2. Low-attenuation liver lesions which likely represent cysts. 3. Pancreatic cystic lesion, not demonstrated in 2009. Maximum long axis of 2.9  cm. Follow-up imaging optimally with MRI with and without contrast is  recommended. 4. Cholelithiasis. 5. Renal cysts. 6. Diverticulosis. 23X         Results from [place] encounter on 08/15/18   CT SPINE LUMB WO CONT    Narrative Indication: Low back pain. TECHNIQUE: Thin section noncontrast axial CT images of the lumbar spine with  coronal and sagittal reformatted images. COMPARISON: Lumbar spine MRI August 15, 2018. Lumbar spine radiographs September 27, 2016. Lumbar spine MRI August 31, 2016. Impression FINDINGS/IMPRESSION:  Posterior fusion spanning L5-S1 with intervertebral disc spacer. No evidence of  hardware complication.     Disproportionate discogenic degenerative changes at L3-L4 with intervertebral  disc space narrowing and broad-based disc osteophyte complex. Chronic bilateral L5 pars defects, with sclerotic changes on the right. No  spondylolisthesis. Limited evaluation of portions of the retroperitoneum. Scarring at the lung  bases. Fatty infiltration of the pancreas. Subtle areas of medullary  nephrocalcinosis. Mild tracking fluid within the deep pelvis, images 86 through 97 of series 2. Abnormal finding in a male patient. Uncertain clinical significance. MRI Results (maximum last 3): Results from [place] encounter on 11/28/18   MRI LUMB SPINE WO CONT    Narrative EXAM:  MRI LUMB SPINE WO CONT    INDICATION:   Low back pain, unspecified back pain laterality, unspecified  chronicity, with sciatica presence unspecified    COMPARISON: MRI lumbar spine 8/15/2018. TECHNIQUE: Multiplanar multisequence acquisition without contrast of the lumbar  spine. CONTRAST: None. FINDINGS:  The last well-formed disk is designated as L5-S1 for the purpose of this report. Vertebral bodies were numbered using this convention. Stable postsurgical changes of L5-S1 posterior spinal fusion. Mild  retrolisthesis of L3 on L4, unchanged. Vertebral body heights are maintained  without evidence of acute fracture. Marrow signal is heterogeneous, but within  normal limits. Multiple vertebral body hemangiomas are again noted, largest  within the L3 vertebral body. Multilevel degenerative endplate marrow changes  are noted. Severe multilevel degenerative disc disease and facet arthropathy as  detailed below, overall not significant change since prior exam. The conus is  normal in size and signal and terminates at T12-L1. The cauda equina is  unremarkable. Visualized soft tissues are unremarkable. T12-L1: Minimal diffuse disc bulge. No significant spinal canal or neural  foraminal stenosis. L1-L2: Disc height loss. No significant disc herniation, spinal canal or neural  foraminal stenosis.     L2-L3: Diffuse disc bulge, eccentric to the left, with a small superimposed  central disc extrusion with slight inferior migration extending 5 mm below the  disc space. Severe bilateral facet arthropathy and ligamentum flavum thickening. Severe spinal canal stenosis. Moderate to severe left and mild right neural  foraminal stenosis. L3-L4: Diffuse disc osteophyte complex with severe right and mild left facet  arthropathy. Moderate spinal canal stenosis. Moderate right and mild left neural  foraminal stenosis. L4-L5: Diffuse disc bulge with severe bilateral facet arthropathy. No  significant spinal canal stenosis. Mild bilateral neural foraminal stenosis. L5-S1: Status post posterior spinal fusion. This can spinal canal stenosis. Severe bilateral facet arthropathy. Severe right and mild-to-moderate left  neural foraminal stenosis. Impression IMPRESSION:    1. Severe multilevel degenerative disc disease and facet arthropathy as detailed  above, overall not significantly changed since 8/15/2018.  2. Severe spinal canal stenosis and moderate to severe left neural foraminal  stenosis at L2-L3. 3. Moderate spinal canal stenosis at L3-L4  4. Severe right neural foraminal stenosis at L5-S1. Stable postsurgical changes  of L5-S1 posterior spinal fusion. Results from [place] encounter on 08/15/18   MRI LUMB SPINE W WO CONT    Narrative Indication: Lower back pain with sciatica present. TECHNIQUE: Multiplanar, multisequence noncontrast lumbar spine MRI. COMPARISON: Lumbar spine MRI 31 August 2016. FINDINGS:  For discussion purposes, the first axial T2-weighted image findings the superior  endplate of the L1 vertebral body. There is slight interval progression of broad-based disc osteophyte complex at  L2-L3 superimposed on moderate bilateral facet arthropathy and ligamentum flavum  hypertrophy. Progressive, now moderate/severe central canal narrowing at this  level.  Chronic clumping/disorganization of cauda equina nerve roots secondary to  degree of central canal narrowing. Less severe broad-based disc osteophyte complex at L3-L4 with mild/moderate  central canal narrowing. Mild levoconvex curvature of the lumbar spine. The balance of the exam is unchanged when compared to 2016 study with multilevel  discogenic degenerative changes throughout the lumbar spine. Again noted  posterior fusion changes spanning L5-S1 with intervertebral disc spacer at this  level. Chronic bilateral L5 pars defects with sclerotic changes on the right. Diffuse, chronic heterogeneous marrow signal, suspect marrow reconversion. Scattered benign hemangiomata of the lumbar vertebral bodies. Impression IMPRESSION:  When compared with 2016 exam, interval progression of broad-based disc  osteophyte complex at L2-L3, now resulting in moderate/severe central canal  narrowing. Less severe disc osteophyte complex at L3-L4. Chronic clumping/disorganization of cauda equina nerve roots secondary to degree  of central canal narrowing. Results from [place] encounter on 07/10/17   MRI SHOULDER LT WO CONT    Narrative EXAM:  MRI SHOULDER LT WO CONT    INDICATION: Left shoulder pain and limited range of motion. No resolution with  steroid injection 2 weeks ago. COMPARISON: None    TECHNIQUE: Axial and coronal proton density and T2 fat-saturated; sagittal T1  and T2 fat-saturated MRI of the left shoulder. CONTRAST: None. FINDINGS: A.C. joint: Age-appropriate moderate osteoarthritis. Mild capsular  hypertrophy and small marginal osteophytes. Anterior acromion process type: 2  with inferior osteophytes. Bone marrow: Degenerative cyst in the humeral head is deep to the distal  supraspinatus and infraspinatus tendons and measures 1.1 cm in greatest  diameter. No acute fracture, dislocation, or marrow replacing process. Joint fluid: Small glenohumeral joint effusion extends into the  subacromial/subdeltoid bursa. Rotator cuff tendons: Full thickness tear of the distal supraspinatus tendon is  located less than 1 cm from the insertion. Gap between retracted tendon margins  measures 1.3 cm. Infraspinatus and teres minor tendons are intact. Subscapularis tendon is  intact. Biceps tendon: Indistinct intra-articular biceps tendon. Extra-articular long  head of the biceps tendon is thin. Muscles: Moderate edema is in the supraspinatus muscle. No focal muscle atrophy. Glenoid labrum: Mild age-appropriate nondisplaced degeneration. Joint capsule: within normal limits. Glenohumeral articular cartilage: Heterogeneous mild osteoarthritis. Soft tissue mass: None. Impression IMPRESSION:   1. Full-thickness supraspinatus tendon tear is located 1 cm from its insertion. 1.3 cm retraction. The tear may be recent given the moderate muscle strain. 2. Thin, indistinct long head of the biceps tendon represents at least  high-grade partial-thickness tear. No evidence of complete tear or retraction. 3. Age-appropriate osteoarthritis. Nuclear Medicine Results (maximum last 3): No results found for this or any previous visit. US Results (maximum last 3): Results from [place] encounter on 12/11/18   US PARACENTESIS ABD W IMAGING    Narrative Clinical indication: Ascites. Ultrasound was provided for a diagnostic paracentesis performed in the right  lower quadrant by Dr. Naida Hoover. Impression IMPRESSION: Ultrasound-guided paracentesis. DORCAS Results (most recent):  No results found for this or any previous visit. VAS/US Results (maximum last 3): No results found for this or any previous visit. PET Results (maximum last 3): No results found for this or any previous visit. No results found for any visits on 12/27/18. No orders of the defined types were placed in this encounter.       ASSESSMENT and PLAN:  Metastatic Pancreatic cancer--positive peritoneal cytology--long discussion of treatment options including best supportive care--offered [place]/abraxane--does help qol and os. Wants to consider this treatment option with Hospice option. Has pancreatic head lesion so will watch for signs of blockage--jaundice etc.  Discussed Celiac Plexus block as well. Will see his primary MD next week and if wants to start chemo will get port and begin. With chemo OS 9-12 months, without 3-6 months.       [facility]

## 2018-12-27 NOTE — PROGRESS NOTES
Neyda Mayfield is a 77 y.o. male here today for initial visit with Dr Maria Esther Guillen. Referred by Dr Adrien Mchugh for Pancreatic cancer. C/o pain in abdomen, taking tramadol 1 tab 2-3 times daily. He has a thoracentesis on 12/11/18, only a small amount of fluid was withdrawn. Last Colonoscopy was 2017.

## 2019-01-02 ENCOUNTER — TELEPHONE (OUTPATIENT)
Dept: ONCOLOGY | Age: 79
End: 2019-01-02

## 2019-01-02 NOTE — TELEPHONE ENCOUNTER
Returned daughters. Dr Sana Landin called in Rx for Zofran for patient, this is helping. Provided on call phone number [phone] and to ask for Oncologist on call in the future.

## 2019-01-04 ENCOUNTER — OFFICE VISIT (OUTPATIENT)
Dept: ONCOLOGY | Age: 79
End: 2019-01-04

## 2019-01-04 VITALS
RESPIRATION RATE: 18 BRPM | WEIGHT: 175.6 LBS | TEMPERATURE: 97.8 F | BODY MASS INDEX: 23.78 KG/M2 | HEART RATE: 60 BPM | SYSTOLIC BLOOD PRESSURE: 129 MMHG | DIASTOLIC BLOOD PRESSURE: 72 MMHG | HEIGHT: 72 IN | OXYGEN SATURATION: 95 %

## 2019-01-04 DIAGNOSIS — C25.0 MALIGNANT NEOPLASM OF HEAD OF PANCREAS (HCC): Primary | ICD-10-CM

## 2019-01-04 RX ORDER — ONDANSETRON 4 MG/1
1 TABLET, ORALLY DISINTEGRATING ORAL
Refills: 5 | COMMUNITY
Start: 2018-12-29 | End: 2019-02-08 | Stop reason: SDUPTHER

## 2019-01-04 NOTE — PROGRESS NOTES
Roge Franklin is a 77 y.o. male who presents to the office today for the following:  No chief complaint on file. Referring Provider:  Parisa Landry MD    HPI  Mr Zack Ryan continues to do well. Had an episode of nausea over the weekend that was attributed to taking tramadol on an empty stomach. Eating well--small meals frequently. Wants to go to [facility] to see a Pancreatic specialist.  Mild pain in lower back and lower abd--well managed with tramadol    CURRENT TREATMENT:    Past Medical History:   Diagnosis Date    CAD (coronary artery disease)     3 cardiac stents   heart Dr. Katherin Good Cervical spondylosis without myelopathy 2009    Chronic pain     shoulder left, upper lumbar    Depressive disorder, not elsewhere classified     Diaphragmatic hernia without mention of obstruction or gangrene     GERD (gastroesophageal reflux disease)     Hypercholesterolemia     Hypertension     Hypertrophy of prostate without urinary obstruction and other lower urinary tract symptoms (LUTS) 2008    Insomnia, unspecified 2009    Lumbosacral spondylosis without myelopathy 2009    Osteoarthrosis involving, or with mention of more than one site, but not specified as generalized, multiple sites 2010    Varicose veins 12/11/2014     Past Surgical History:   Procedure Laterality Date    CABG, ARTERY-VEIN, TWO  1/9/12    CABG    HX APPENDECTOMY  1962    HX CATARACT REMOVAL  2015    bilateral    HX COLONOSCOPY  2010    x2 small beign polyps    HX COLONOSCOPY  2017    hyperplastic polyp    HX ENDOSCOPY  2010    acid reflux    HX HEART CATHETERIZATION  1998, 2001    3 stents    HX HEENT  1962    fx nose repair    HX HERNIA REPAIR  early 1980's    left inguinal, with mesh    HX ORTHOPAEDIC  2017    lumbar fusion    HX OTHER SURGICAL  7 yrs.  old    Hiatal Hernia    HX TONSILLECTOMY       Family History   Problem Relation Age of Onset    Hypertension Father     Heart Disease Father    [facility] Arthritis-osteo Mother     Cancer Brother 78        tumor in back    Heart Disease Brother     Diabetes Maternal Grandfather     Kidney Disease Maternal Grandfather      Social History     Socioeconomic History    Marital status:      Spouse name: Not on file    Number of children: Not on file    Years of education: Not on file    Highest education level: Not on file   Tobacco Use    Smoking status: Former Smoker     Packs/day: 1.00     Years: 10.00     Pack years: 10.00     Types: Cigarettes     Start date: 1961     Last attempt to quit: 1971     Years since quittin.0    Smokeless tobacco: Never Used   Substance and Sexual Activity    Alcohol use: No     Frequency: Never     Binge frequency: Never    Drug use: No    Sexual activity: Yes   Other Topics Concern     Service No    Blood Transfusions No    Caffeine Concern No    Occupational Exposure Yes    Hobby Hazards No    Stress Concern No    Weight Concern Yes    Special Diet No    Back Care No    Exercise Yes    Bike Helmet No    Seat Belt Yes    Self-Exams No       No flowsheet data found.]          Key Oncology Meds     Patient is on no Oncologic meds. No Known Allergies  Current Outpatient Medications   Medication Sig    polyethylene glycol (MIRALAX) 17 gram/dose powder Take 17 g by mouth daily.  traMADol (ULTRAM) 50 mg tablet Take 1 Tab by mouth every six (6) hours as needed for Pain. Max Daily Amount: 200 mg.    amLODIPine (NORVASC) 5 mg tablet Take 5 mg by mouth daily.  DULoxetine (CYMBALTA) 30 mg capsule TAKE ONE CAPSULE BY MOUTH EVERY DAY    tamsulosin (FLOMAX) 0.4 mg capsule TAKE 2 CAPSULES BY MOUTH NIGHLTY    rosuvastatin (CRESTOR) 10 mg tablet Take 10 mg by mouth nightly.  cyclobenzaprine (FLEXERIL) 10 mg tablet TAKE 1 TABLET BY MOUTH THREE (3) TIMES DAILY AS NEEDED FOR MUSCLE SPASM(S).  METOPROLOL TARTRATE PO Take 12.5 mg by mouth two (2) times a day.     multivitamin (ONE A DAY) tablet Take 1 Tab by mouth daily.  aspirin 81 mg tablet Take 81 mg by mouth.  dutasteride (AVODART) 0.5 mg capsule Take 0.5 mg by mouth daily. No current facility-administered medications for this visit. Review of Systems   Constitutional: Negative. HENT: Negative. Eyes: Negative. Respiratory: Negative. Cardiovascular: Negative. Gastrointestinal: Negative. Genitourinary: Negative. Musculoskeletal: Negative. Skin: Negative. Neurological: Negative. Endo/Heme/Allergies: Negative. Psychiatric/Behavioral: Negative. Physical Exam   Constitutional: He is oriented to person, place, and time and well-developed, well-nourished, and in no distress. HENT:   Head: Normocephalic and atraumatic. Eyes: Conjunctivae and EOM are normal.   Neck: Normal range of motion. Cardiovascular: Normal rate. Pulmonary/Chest: Effort normal.   Musculoskeletal: Normal range of motion. Neurological: He is alert and oriented to person, place, and time. Skin: Skin is warm and dry. Psychiatric: Mood, memory, affect and judgment normal.   Vitals reviewed. There were no vitals taken for this visit. Imaging Results:    XR Results (maximum last 3): Results from [place] encounter on 11/15/18   XR SPINE SNGL V (CROSS TABLE LAT)    Narrative INDICATION:  pain procedure     EXAM: Single spot fluoroscopy image of the lumbar spine    FINDINGS: Single spot fluoroscopy image of the lumbar spine was obtained during  pain procedure. Please see operative note for full details    Fluoroscopy time 13.8 seconds          Impression IMPRESSION:  1. INTERPRETATION PROVIDED FOR COMPLIANCE ONLY AT NO CHARGE     XR FLUOROSCOPY UNDER 60 MINUTES    Narrative Fluoroscopy time was provided. Impression IMPRESSION:  Fluoroscopy time was provided.      Fluoro dose:  1.95 mGy      vk   Results from [place] encounter on 12/12/17   XR HIP LT W OR WO PELV 2-3 VWS Narrative PELVIS AND LEFT HIP, 2 VIEWS    INDICATION:  m25.552. COMPARISON:  None. FINDINGS:    There is no acute fracture or dislocation. Mild bilateral hip joint space  narrowing. The SI joints and pubic symphysis are normal. Golden and pedicle screw  fixation hardware at L5 and S1. Interbody cage at L5-S1. Arterial calcification  is seen within the pelvis. Bone mineralization is normal.      Impression IMPRESSION:    Mild bilateral hip joint space narrowing. CT Results (maximum last 3): Results from [place] encounter on 12/11/18   CT ABD PELV W CONT    Narrative EXAM: CT ABD PELV W CONT    INDICATION: Ascites . Possible pancreatitis. COMPARISON: CT abdomen/pelvis December 4, 2018. CT abdomen/pelvis July 8, 2009. TECHNIQUE:   Following the uneventful intravenous administration of contrast, thin axial  multiphase images were obtained through the abdomen and pelvis. Coronal and  sagittal reconstructions were generated. Oral contrast was not administered. CT  dose reduction was achieved through use of a standardized protocol tailored for  this examination and automatic exposure control for dose modulation. FINDINGS:   LUNG BASES: Bibasilar linear atelectasis versus scarring. INCIDENTALLY IMAGED HEART AND MEDIASTINUM: Unremarkable. LIVER: Multiple hepatic cysts measure up to 4.1 cm. GALLBLADDER: Cholelithiasis. SPLEEN: No mass. PANCREAS: At the junction of the pancreatic head/body, there is a 2 x 2.5 x 2.8  cm hypodense mass abutting, but not encasing, the superior mesenteric vein. There is main pancreatic ductal dilation measuring up to 1.5 cm maximal  diameter. ADRENALS: Unremarkable. KIDNEYS: No mass, calculus, or hydronephrosis. STOMACH: Unremarkable. SMALL BOWEL: No dilatation or wall thickening. COLON: No dilatation or wall thickening. APPENDIX: Unremarkable.   PERITONEUM: There is mild/moderate ascites with diffuse areas of omental  nodularity most compatible with peritoneal carcinomatosis. RETROPERITONEUM: No lymphadenopathy or aortic aneurysm. URINARY BLADDER: Mild diffuse bladder wall thickening, possible chronic bladder  outlet obstruction. BONES: No destructive bone lesion. Diffuse osseous degenerative changes. Posterior fusion spanning L5-S1. Impression IMPRESSION:  1. A 2.8 x 2.5 x 2 cm mass at the pancreatic head/body junction. Associated   main pancreatic ductal dilation measuring up to 1.5 cm.  2. Mild/moderate intra-abdominal ascites with suspected peritoneal  carcinomatosis. Results from [place] encounter on 12/04/18   CT ABD PELV WO CONT    Narrative EXAM:  CT ABD PELV WO CONT    INDICATION: Abdominal pain; Hernia , periumbilical abdominal pain for 2 weeks,  question of hernia, previous left inguinal hernia repair x2, previous  appendectomy    COMPARISON: 7/8/2009    CONTRAST:  None. TECHNIQUE:   Thin axial images were obtained through the abdomen and pelvis. Coronal and  sagittal reconstructions were generated. Oral contrast was not administered. CT  dose reduction was achieved through use of a standardized protocol tailored for  this examination and automatic exposure control for dose modulation. The absence of intravenous contrast material reduces the sensitivity for  evaluation of the solid parenchymal organs of the abdomen. FINDINGS:   LUNG BASES: Linear atelectasis or scarring in the lung bases. No significant  consolidation. No pleural fluid. INCIDENTALLY IMAGED HEART AND MEDIASTINUM: Extensive coronary artery  calcification. LIVER: Normal size, density, and configuration. Several well-defined low density  lesions which likely represent cysts. Other smaller lesions difficult to  characterize but probably also cysts. Sensitivity limited without use of IV  contrast.  GALLBLADDER: Layered stones in the dependent portion. SPLEEN: Normal size. No focal lesions.   PANCREAS: 1.7 x 2.9 x 2.1 cm (2-35, 4-25) cystic lesion in the pancreatic head,  measuring fluid density. Not clearly present in 2009. ADRENALS: Unremarkable. KIDNEYS/URETERS: No mass, calculus, or hydronephrosis. STOMACH: Small to moderate-sized hiatus hernia. Otherwise unremarkable. SMALL BOWEL: No dilatation or wall thickening. COLON: No dilatation or wall thickening. APPENDIX: Surgically absent. PERITONEUM: Moderate ascites in the abdomen and pelvis. No focal fluid  collections. Thickening of the greater omentum suggesting infiltrating  carcinoma. RETROPERITONEUM: Atherosclerotic aorta without aneurysm. No retroperitoneal  adenopathy. REPRODUCTIVE ORGANS: Unremarkable. URINARY BLADDER: No mass or calculus. BONES: Degenerative changes in the lumbar spine. Postsurgical changes at L5-S1. ADDITIONAL COMMENTS: N/A. No ventral hernia. Impression IMPRESSION:    1. Moderate ascites in the abdomen and pelvis. Thickening of the greater omentum  suggesting carcinomatous infiltration. 2. Low-attenuation liver lesions which likely represent cysts. 3. Pancreatic cystic lesion, not demonstrated in 2009. Maximum long axis of 2.9  cm. Follow-up imaging optimally with MRI with and without contrast is  recommended. 4. Cholelithiasis. 5. Renal cysts. 6. Diverticulosis. 23X         Results from [place] encounter on 08/15/18   CT SPINE LUMB WO CONT    Narrative Indication: Low back pain. TECHNIQUE: Thin section noncontrast axial CT images of the lumbar spine with  coronal and sagittal reformatted images. COMPARISON: Lumbar spine MRI August 15, 2018. Lumbar spine radiographs September 27, 2016. Lumbar spine MRI August 31, 2016. Impression FINDINGS/IMPRESSION:  Posterior fusion spanning L5-S1 with intervertebral disc spacer. No evidence of  hardware complication. Disproportionate discogenic degenerative changes at L3-L4 with intervertebral  disc space narrowing and broad-based disc osteophyte complex.     Chronic bilateral L5 pars defects, with sclerotic changes on the right. No  spondylolisthesis. Limited evaluation of portions of the retroperitoneum. Scarring at the lung  bases. Fatty infiltration of the pancreas. Subtle areas of medullary  nephrocalcinosis. Mild tracking fluid within the deep pelvis, images 86 through 97 of series 2. Abnormal finding in a male patient. Uncertain clinical significance. MRI Results (maximum last 3): Results from [place] encounter on 11/28/18   MRI LUMB SPINE WO CONT    Narrative EXAM:  MRI LUMB SPINE WO CONT    INDICATION:   Low back pain, unspecified back pain laterality, unspecified  chronicity, with sciatica presence unspecified    COMPARISON: MRI lumbar spine 8/15/2018. TECHNIQUE: Multiplanar multisequence acquisition without contrast of the lumbar  spine. CONTRAST: None. FINDINGS:  The last well-formed disk is designated as L5-S1 for the purpose of this report. Vertebral bodies were numbered using this convention. Stable postsurgical changes of L5-S1 posterior spinal fusion. Mild  retrolisthesis of L3 on L4, unchanged. Vertebral body heights are maintained  without evidence of acute fracture. Marrow signal is heterogeneous, but within  normal limits. Multiple vertebral body hemangiomas are again noted, largest  within the L3 vertebral body. Multilevel degenerative endplate marrow changes  are noted. Severe multilevel degenerative disc disease and facet arthropathy as  detailed below, overall not significant change since prior exam. The conus is  normal in size and signal and terminates at T12-L1. The cauda equina is  unremarkable. Visualized soft tissues are unremarkable. T12-L1: Minimal diffuse disc bulge. No significant spinal canal or neural  foraminal stenosis. L1-L2: Disc height loss. No significant disc herniation, spinal canal or neural  foraminal stenosis.     L2-L3: Diffuse disc bulge, eccentric to the left, with a small superimposed  central disc extrusion with slight inferior migration extending 5 mm below the  disc space. Severe bilateral facet arthropathy and ligamentum flavum thickening. Severe spinal canal stenosis. Moderate to severe left and mild right neural  foraminal stenosis. L3-L4: Diffuse disc osteophyte complex with severe right and mild left facet  arthropathy. Moderate spinal canal stenosis. Moderate right and mild left neural  foraminal stenosis. L4-L5: Diffuse disc bulge with severe bilateral facet arthropathy. No  significant spinal canal stenosis. Mild bilateral neural foraminal stenosis. L5-S1: Status post posterior spinal fusion. This can spinal canal stenosis. Severe bilateral facet arthropathy. Severe right and mild-to-moderate left  neural foraminal stenosis. Impression IMPRESSION:    1. Severe multilevel degenerative disc disease and facet arthropathy as detailed  above, overall not significantly changed since 8/15/2018.  2. Severe spinal canal stenosis and moderate to severe left neural foraminal  stenosis at L2-L3. 3. Moderate spinal canal stenosis at L3-L4  4. Severe right neural foraminal stenosis at L5-S1. Stable postsurgical changes  of L5-S1 posterior spinal fusion. Results from [place] encounter on 08/15/18   MRI LUMB SPINE W WO CONT    Narrative Indication: Lower back pain with sciatica present. TECHNIQUE: Multiplanar, multisequence noncontrast lumbar spine MRI. COMPARISON: Lumbar spine MRI 31 August 2016. FINDINGS:  For discussion purposes, the first axial T2-weighted image findings the superior  endplate of the L1 vertebral body. There is slight interval progression of broad-based disc osteophyte complex at  L2-L3 superimposed on moderate bilateral facet arthropathy and ligamentum flavum  hypertrophy. Progressive, now moderate/severe central canal narrowing at this  level. Chronic clumping/disorganization of cauda equina nerve roots secondary to  degree of central canal narrowing.     Less severe broad-based disc osteophyte complex at L3-L4 with mild/moderate  central canal narrowing. Mild levoconvex curvature of the lumbar spine. The balance of the exam is unchanged when compared to 2016 study with multilevel  discogenic degenerative changes throughout the lumbar spine. Again noted  posterior fusion changes spanning L5-S1 with intervertebral disc spacer at this  level. Chronic bilateral L5 pars defects with sclerotic changes on the right. Diffuse, chronic heterogeneous marrow signal, suspect marrow reconversion. Scattered benign hemangiomata of the lumbar vertebral bodies. Impression IMPRESSION:  When compared with 2016 exam, interval progression of broad-based disc  osteophyte complex at L2-L3, now resulting in moderate/severe central canal  narrowing. Less severe disc osteophyte complex at L3-L4. Chronic clumping/disorganization of cauda equina nerve roots secondary to degree  of central canal narrowing. Results from [place] encounter on 07/10/17   MRI SHOULDER LT WO CONT    Narrative EXAM:  MRI SHOULDER LT WO CONT    INDICATION: Left shoulder pain and limited range of motion. No resolution with  steroid injection 2 weeks ago. COMPARISON: None    TECHNIQUE: Axial and coronal proton density and T2 fat-saturated; sagittal T1  and T2 fat-saturated MRI of the left shoulder. CONTRAST: None. FINDINGS: A.C. joint: Age-appropriate moderate osteoarthritis. Mild capsular  hypertrophy and small marginal osteophytes. Anterior acromion process type: 2  with inferior osteophytes. Bone marrow: Degenerative cyst in the humeral head is deep to the distal  supraspinatus and infraspinatus tendons and measures 1.1 cm in greatest  diameter. No acute fracture, dislocation, or marrow replacing process. Joint fluid: Small glenohumeral joint effusion extends into the  subacromial/subdeltoid bursa.      Rotator cuff tendons: Full thickness tear of the distal supraspinatus tendon is  located less than 1 cm from the insertion. Gap between retracted tendon margins  measures 1.3 cm. Infraspinatus and teres minor tendons are intact. Subscapularis tendon is  intact. Biceps tendon: Indistinct intra-articular biceps tendon. Extra-articular long  head of the biceps tendon is thin. Muscles: Moderate edema is in the supraspinatus muscle. No focal muscle atrophy. Glenoid labrum: Mild age-appropriate nondisplaced degeneration. Joint capsule: within normal limits. Glenohumeral articular cartilage: Heterogeneous mild osteoarthritis. Soft tissue mass: None. Impression IMPRESSION:   1. Full-thickness supraspinatus tendon tear is located 1 cm from its insertion. 1.3 cm retraction. The tear may be recent given the moderate muscle strain. 2. Thin, indistinct long head of the biceps tendon represents at least  high-grade partial-thickness tear. No evidence of complete tear or retraction. 3. Age-appropriate osteoarthritis. Nuclear Medicine Results (maximum last 3): No results found for this or any previous visit. US Results (maximum last 3): Results from [place] encounter on 12/11/18   US PARACENTESIS ABD W IMAGING    Narrative Clinical indication: Ascites. Ultrasound was provided for a diagnostic paracentesis performed in the right  lower quadrant by Dr. Olivia Morris. Impression IMPRESSION: Ultrasound-guided paracentesis. DORCAS Results (most recent):  No results found for this or any previous visit. VAS/US Results (maximum last 3): No results found for this or any previous visit. PET Results (maximum last 3): No results found for this or any previous visit. No results found for any visits on 01/04/19. No orders of the defined types were placed in this encounter. ASSESSMENT and PLAN:  Metastatic Pancreatic Ca: peritoneal mets--positive cytology. Discussed chemo option of Tama/Abraxane.   Wants second opinion from Pancreatic expert at [facility]. I fully support this decision. Printing records to take. I would be happy to work with [facility] on future plans.         Follow-up Disposition: Not on Arturo Hidalgo MD

## 2019-01-04 NOTE — PROGRESS NOTES
Haider Lozano is a 77 y.o. male c/o lower back pain and abd pain rates 2/10. Taking tramadol which is helping, had nausea which he feels was from the Tramadol, he has now started taking with food. States he is eating okay, but less than before his illness.

## 2019-01-15 ENCOUNTER — TELEPHONE (OUTPATIENT)
Dept: ONCOLOGY | Age: 79
End: 2019-01-15

## 2019-01-15 NOTE — TELEPHONE ENCOUNTER
Patient wife Hakeem Bowie, would like to speak with you before Apt scheduled on 1/18/19. Please call back at [phone] or [phone].

## 2019-01-18 ENCOUNTER — OFFICE VISIT (OUTPATIENT)
Dept: ONCOLOGY | Age: 79
End: 2019-01-18

## 2019-01-18 VITALS
DIASTOLIC BLOOD PRESSURE: 75 MMHG | SYSTOLIC BLOOD PRESSURE: 127 MMHG | WEIGHT: 178.8 LBS | HEIGHT: 72 IN | RESPIRATION RATE: 18 BRPM | HEART RATE: 60 BPM | OXYGEN SATURATION: 96 % | TEMPERATURE: 98.3 F | BODY MASS INDEX: 24.22 KG/M2

## 2019-01-18 DIAGNOSIS — C25.0 MALIGNANT NEOPLASM OF HEAD OF PANCREAS (HCC): Primary | ICD-10-CM

## 2019-01-18 RX ORDER — DEXAMETHASONE 4 MG/1
8 TABLET ORAL DAILY
Qty: 30 TAB | Refills: 1 | Status: SHIPPED | OUTPATIENT
Start: 2019-01-18 | End: 2019-09-27 | Stop reason: SDUPTHER

## 2019-01-18 NOTE — PROGRESS NOTES
Debi Fofana is a 77 y.o. male here for f/u, had consult at [facility]. He is having abd/back pain at times rates pain 3/10, he is taking tramadol for the pain which is helping. Denies nausea, he denies problems with appetite.

## 2019-01-18 NOTE — PROGRESS NOTES
Haider Lozano is a 77 y.o. male who presents to the office today for the following:  No chief complaint on file. Referring Provider:  Kay Potter MD    HPI   Mr Alesha Grove returns after visit with Ayaan Smith. Recommendation was for FOLFIRINOX. Also wanted repeat CT Abd to eval changes in tumor size to get more accurate response to chemotherapy. No change in symptoms. Overall feeling positive about his treatment. CURRENT TREATMENT:    Past Medical History:   Diagnosis Date    CAD (coronary artery disease)     3 cardiac stents   heart Dr. Teresa Burgos Cervical spondylosis without myelopathy 2009    Chronic pain     shoulder left, upper lumbar    Depressive disorder, not elsewhere classified     Diaphragmatic hernia without mention of obstruction or gangrene     GERD (gastroesophageal reflux disease)     Hypercholesterolemia     Hypertension     Hypertrophy of prostate without urinary obstruction and other lower urinary tract symptoms (LUTS) 2008    Insomnia, unspecified 2009    Lumbosacral spondylosis without myelopathy 2009    Osteoarthrosis involving, or with mention of more than one site, but not specified as generalized, multiple sites 2010    Varicose veins 12/11/2014     Past Surgical History:   Procedure Laterality Date    CABG, ARTERY-VEIN, TWO  1/9/12    CABG    HX APPENDECTOMY  1962    HX CATARACT REMOVAL  2015    bilateral    HX COLONOSCOPY  2010    x2 small beign polyps    HX COLONOSCOPY  2017    hyperplastic polyp    HX ENDOSCOPY  2010    acid reflux    HX HEART CATHETERIZATION  1998, 2001    3 stents    HX HEENT  1962    fx nose repair    HX HERNIA REPAIR  early 1980's    left inguinal, with mesh    HX ORTHOPAEDIC  2017    lumbar fusion    HX OTHER SURGICAL  7 yrs.  old    Hiatal Hernia    HX TONSILLECTOMY       Family History   Problem Relation Age of Onset    Hypertension Father     Heart Disease Father     Arthritis-osteo Mother     Cancer Brother 78 tumor in back    Heart Disease Brother     Diabetes Maternal Grandfather     Kidney Disease Maternal Grandfather      Social History     Socioeconomic History    Marital status:      Spouse name: Not on file    Number of children: Not on file    Years of education: Not on file    Highest education level: Not on file   Tobacco Use    Smoking status: Former Smoker     Packs/day: 1.00     Years: 10.00     Pack years: 10.00     Types: Cigarettes     Start date: 1961     Last attempt to quit: 1971     Years since quittin.0    Smokeless tobacco: Never Used   Substance and Sexual Activity    Alcohol use: No     Frequency: Never     Binge frequency: Never    Drug use: No    Sexual activity: Yes   Other Topics Concern     Service No    Blood Transfusions No    Caffeine Concern No    Occupational Exposure Yes    Hobby Hazards No    Stress Concern No    Weight Concern Yes    Special Diet No    Back Care No    Exercise Yes    Bike Helmet No    Seat Belt Yes    Self-Exams No       No flowsheet data found.]          Key Oncology Meds             ondansetron (ZOFRAN ODT) 4 mg disintegrating tablet Take 1 Tab by mouth every eight (8) hours as needed. No Known Allergies  Current Outpatient Medications   Medication Sig    ondansetron (ZOFRAN ODT) 4 mg disintegrating tablet Take 1 Tab by mouth every eight (8) hours as needed.  polyethylene glycol (MIRALAX) 17 gram/dose powder Take 17 g by mouth daily.  traMADol (ULTRAM) 50 mg tablet Take 1 Tab by mouth every six (6) hours as needed for Pain. Max Daily Amount: 200 mg.    amLODIPine (NORVASC) 5 mg tablet Take 5 mg by mouth daily.  DULoxetine (CYMBALTA) 30 mg capsule TAKE ONE CAPSULE BY MOUTH EVERY DAY    tamsulosin (FLOMAX) 0.4 mg capsule TAKE 2 CAPSULES BY MOUTH NIGHLTY    rosuvastatin (CRESTOR) 10 mg tablet Take 10 mg by mouth nightly.     cyclobenzaprine (FLEXERIL) 10 mg tablet TAKE 1 TABLET BY MOUTH THREE (3) TIMES DAILY AS NEEDED FOR MUSCLE SPASM(S).  METOPROLOL TARTRATE PO Take 12.5 mg by mouth two (2) times a day.  multivitamin (ONE A DAY) tablet Take 1 Tab by mouth daily.  aspirin 81 mg tablet Take 81 mg by mouth.  dutasteride (AVODART) 0.5 mg capsule Take 0.5 mg by mouth daily. No current facility-administered medications for this visit. Review of Systems   Constitutional: Negative. HENT: Negative. Eyes: Negative. Respiratory: Negative. Cardiovascular: Negative. Gastrointestinal: Negative. Genitourinary: Negative. Musculoskeletal: Negative. Skin: Negative. Neurological: Negative. Endo/Heme/Allergies: Negative. Psychiatric/Behavioral: Negative. Physical Exam   Constitutional: He is oriented to person, place, and time and well-developed, well-nourished, and in no distress. HENT:   Head: Normocephalic and atraumatic. Eyes: Conjunctivae and EOM are normal. Pupils are equal, round, and reactive to light. Neck: Normal range of motion. Neck supple. Cardiovascular: Normal rate and regular rhythm. Pulmonary/Chest: Effort normal and breath sounds normal.   Abdominal: Soft. Bowel sounds are normal.   Musculoskeletal: Normal range of motion. Neurological: He is alert and oriented to person, place, and time. Skin: Skin is warm and dry. Psychiatric: Mood, affect and judgment normal.   Vitals reviewed. There were no vitals taken for this visit. Imaging Results:    XR Results (maximum last 3): Results from [place] encounter on 11/15/18   XR SPINE SNGL V (CROSS TABLE LAT)    Narrative INDICATION:  pain procedure     EXAM: Single spot fluoroscopy image of the lumbar spine    FINDINGS: Single spot fluoroscopy image of the lumbar spine was obtained during  pain procedure. Please see operative note for full details    Fluoroscopy time 13.8 seconds          Impression IMPRESSION:  1.  INTERPRETATION PROVIDED FOR COMPLIANCE ONLY AT NO CHARGE     XR FLUOROSCOPY UNDER 60 MINUTES    Narrative Fluoroscopy time was provided. Impression IMPRESSION:  Fluoroscopy time was provided. Fluoro dose:  1.95 mGy      vk   Results from Hospital Encounter encounter on 12/12/17   XR HIP LT W OR WO PELV 2-3 VWS    Narrative PELVIS AND LEFT HIP, 2 VIEWS    INDICATION:  [id number]. COMPARISON:  None. FINDINGS:    There is no acute fracture or dislocation. Mild bilateral hip joint space  narrowing. The SI joints and pubic symphysis are normal. Golden and pedicle screw  fixation hardware at L5 and S1. Interbody cage at L5-S1. Arterial calcification  is seen within the pelvis. Bone mineralization is normal.      Impression IMPRESSION:    Mild bilateral hip joint space narrowing. CT Results (maximum last 3): Results from [place] encounter on 12/11/18   CT ABD PELV W CONT    Narrative EXAM: CT ABD PELV W CONT    INDICATION: Ascites . Possible pancreatitis. COMPARISON: CT abdomen/pelvis December 4, 2018. CT abdomen/pelvis July 8, 2009. TECHNIQUE:   Following the uneventful intravenous administration of contrast, thin axial  multiphase images were obtained through the abdomen and pelvis. Coronal and  sagittal reconstructions were generated. Oral contrast was not administered. CT  dose reduction was achieved through use of a standardized protocol tailored for  this examination and automatic exposure control for dose modulation. FINDINGS:   LUNG BASES: Bibasilar linear atelectasis versus scarring. INCIDENTALLY IMAGED HEART AND MEDIASTINUM: Unremarkable. LIVER: Multiple hepatic cysts measure up to 4.1 cm. GALLBLADDER: Cholelithiasis. SPLEEN: No mass. PANCREAS: At the junction of the pancreatic head/body, there is a 2 x 2.5 x 2.8  cm hypodense mass abutting, but not encasing, the superior mesenteric vein. There is main pancreatic ductal dilation measuring up to 1.5 cm maximal  diameter. ADRENALS: Unremarkable.   KIDNEYS: No mass, calculus, or hydronephrosis. STOMACH: Unremarkable. SMALL BOWEL: No dilatation or wall thickening. COLON: No dilatation or wall thickening. APPENDIX: Unremarkable. PERITONEUM: There is mild/moderate ascites with diffuse areas of omental  nodularity most compatible with peritoneal carcinomatosis. RETROPERITONEUM: No lymphadenopathy or aortic aneurysm. URINARY BLADDER: Mild diffuse bladder wall thickening, possible chronic bladder  outlet obstruction. BONES: No destructive bone lesion. Diffuse osseous degenerative changes. Posterior fusion spanning L5-S1. Impression IMPRESSION:  1. A 2.8 x 2.5 x 2 cm mass at the pancreatic head/body junction. Associated   main pancreatic ductal dilation measuring up to 1.5 cm.  2. Mild/moderate intra-abdominal ascites with suspected peritoneal  carcinomatosis. Results from [place] encounter on 12/04/18   CT ABD PELV WO CONT    Narrative EXAM:  CT ABD PELV WO CONT    INDICATION: Abdominal pain; Hernia , periumbilical abdominal pain for 2 weeks,  question of hernia, previous left inguinal hernia repair x2, previous  appendectomy    COMPARISON: 7/8/2009    CONTRAST:  None. TECHNIQUE:   Thin axial images were obtained through the abdomen and pelvis. Coronal and  sagittal reconstructions were generated. Oral contrast was not administered. CT  dose reduction was achieved through use of a standardized protocol tailored for  this examination and automatic exposure control for dose modulation. The absence of intravenous contrast material reduces the sensitivity for  evaluation of the solid parenchymal organs of the abdomen. FINDINGS:   LUNG BASES: Linear atelectasis or scarring in the lung bases. No significant  consolidation. No pleural fluid. INCIDENTALLY IMAGED HEART AND MEDIASTINUM: Extensive coronary artery  calcification. LIVER: Normal size, density, and configuration. Several well-defined low density  lesions which likely represent cysts.  Other smaller lesions difficult to  characterize but probably also cysts. Sensitivity limited without use of IV  contrast.  GALLBLADDER: Layered stones in the dependent portion. SPLEEN: Normal size. No focal lesions. PANCREAS: 1.7 x 2.9 x 2.1 cm (2-35, 4-25) cystic lesion in the pancreatic head,  measuring fluid density. Not clearly present in 2009. ADRENALS: Unremarkable. KIDNEYS/URETERS: No mass, calculus, or hydronephrosis. STOMACH: Small to moderate-sized hiatus hernia. Otherwise unremarkable. SMALL BOWEL: No dilatation or wall thickening. COLON: No dilatation or wall thickening. APPENDIX: Surgically absent. PERITONEUM: Moderate ascites in the abdomen and pelvis. No focal fluid  collections. Thickening of the greater omentum suggesting infiltrating  carcinoma. RETROPERITONEUM: Atherosclerotic aorta without aneurysm. No retroperitoneal  adenopathy. REPRODUCTIVE ORGANS: Unremarkable. URINARY BLADDER: No mass or calculus. BONES: Degenerative changes in the lumbar spine. Postsurgical changes at L5-S1. ADDITIONAL COMMENTS: N/A. No ventral hernia. Impression IMPRESSION:    1. Moderate ascites in the abdomen and pelvis. Thickening of the greater omentum  suggesting carcinomatous infiltration. 2. Low-attenuation liver lesions which likely represent cysts. 3. Pancreatic cystic lesion, not demonstrated in 2009. Maximum long axis of 2.9  cm. Follow-up imaging optimally with MRI with and without contrast is  recommended. 4. Cholelithiasis. 5. Renal cysts. 6. Diverticulosis. 23X         Results from [place] encounter on 08/15/18   CT SPINE LUMB WO CONT    Narrative Indication: Low back pain. TECHNIQUE: Thin section noncontrast axial CT images of the lumbar spine with  coronal and sagittal reformatted images. COMPARISON: Lumbar spine MRI August 15, 2018. Lumbar spine radiographs September 27, 2016. Lumbar spine MRI August 31, 2016.       Impression FINDINGS/IMPRESSION:  Posterior fusion spanning L5-S1 with intervertebral disc spacer. No evidence of  hardware complication. Disproportionate discogenic degenerative changes at L3-L4 with intervertebral  disc space narrowing and broad-based disc osteophyte complex. Chronic bilateral L5 pars defects, with sclerotic changes on the right. No  spondylolisthesis. Limited evaluation of portions of the retroperitoneum. Scarring at the lung  bases. Fatty infiltration of the pancreas. Subtle areas of medullary  nephrocalcinosis. Mild tracking fluid within the deep pelvis, images 86 through 97 of series 2. Abnormal finding in a male patient. Uncertain clinical significance. MRI Results (maximum last 3): Results from [place] encounter on 11/28/18   MRI LUMB SPINE WO CONT    Narrative EXAM:  MRI LUMB SPINE WO CONT    INDICATION:   Low back pain, unspecified back pain laterality, unspecified  chronicity, with sciatica presence unspecified    COMPARISON: MRI lumbar spine 8/15/2018. TECHNIQUE: Multiplanar multisequence acquisition without contrast of the lumbar  spine. CONTRAST: None. FINDINGS:  The last well-formed disk is designated as L5-S1 for the purpose of this report. Vertebral bodies were numbered using this convention. Stable postsurgical changes of L5-S1 posterior spinal fusion. Mild  retrolisthesis of L3 on L4, unchanged. Vertebral body heights are maintained  without evidence of acute fracture. Marrow signal is heterogeneous, but within  normal limits. Multiple vertebral body hemangiomas are again noted, largest  within the L3 vertebral body. Multilevel degenerative endplate marrow changes  are noted. Severe multilevel degenerative disc disease and facet arthropathy as  detailed below, overall not significant change since prior exam. The conus is  normal in size and signal and terminates at T12-L1. The cauda equina is  unremarkable. Visualized soft tissues are unremarkable.     T12-L1: Minimal diffuse disc bulge. No significant spinal canal or neural  foraminal stenosis. L1-L2: Disc height loss. No significant disc herniation, spinal canal or neural  foraminal stenosis. L2-L3: Diffuse disc bulge, eccentric to the left, with a small superimposed  central disc extrusion with slight inferior migration extending 5 mm below the  disc space. Severe bilateral facet arthropathy and ligamentum flavum thickening. Severe spinal canal stenosis. Moderate to severe left and mild right neural  foraminal stenosis. L3-L4: Diffuse disc osteophyte complex with severe right and mild left facet  arthropathy. Moderate spinal canal stenosis. Moderate right and mild left neural  foraminal stenosis. L4-L5: Diffuse disc bulge with severe bilateral facet arthropathy. No  significant spinal canal stenosis. Mild bilateral neural foraminal stenosis. L5-S1: Status post posterior spinal fusion. This can spinal canal stenosis. Severe bilateral facet arthropathy. Severe right and mild-to-moderate left  neural foraminal stenosis. Impression IMPRESSION:    1. Severe multilevel degenerative disc disease and facet arthropathy as detailed  above, overall not significantly changed since 8/15/2018.  2. Severe spinal canal stenosis and moderate to severe left neural foraminal  stenosis at L2-L3. 3. Moderate spinal canal stenosis at L3-L4  4. Severe right neural foraminal stenosis at L5-S1. Stable postsurgical changes  of L5-S1 posterior spinal fusion. Results from [place] encounter on 08/15/18   MRI LUMB SPINE W WO CONT    Narrative Indication: Lower back pain with sciatica present. TECHNIQUE: Multiplanar, multisequence noncontrast lumbar spine MRI. COMPARISON: Lumbar spine MRI 31 August 2016. FINDINGS:  For discussion purposes, the first axial T2-weighted image findings the superior  endplate of the L1 vertebral body.     There is slight interval progression of broad-based disc osteophyte complex at  L2-L3 superimposed on moderate bilateral facet arthropathy and ligamentum flavum  hypertrophy. Progressive, now moderate/severe central canal narrowing at this  level. Chronic clumping/disorganization of cauda equina nerve roots secondary to  degree of central canal narrowing. Less severe broad-based disc osteophyte complex at L3-L4 with mild/moderate  central canal narrowing. Mild levoconvex curvature of the lumbar spine. The balance of the exam is unchanged when compared to 2016 study with multilevel  discogenic degenerative changes throughout the lumbar spine. Again noted  posterior fusion changes spanning L5-S1 with intervertebral disc spacer at this  level. Chronic bilateral L5 pars defects with sclerotic changes on the right. Diffuse, chronic heterogeneous marrow signal, suspect marrow reconversion. Scattered benign hemangiomata of the lumbar vertebral bodies. Impression IMPRESSION:  When compared with 2016 exam, interval progression of broad-based disc  osteophyte complex at L2-L3, now resulting in moderate/severe central canal  narrowing. Less severe disc osteophyte complex at L3-L4. Chronic clumping/disorganization of cauda equina nerve roots secondary to degree  of central canal narrowing. Results from [place] encounter on 07/10/17   MRI SHOULDER LT WO CONT    Narrative EXAM:  MRI SHOULDER LT WO CONT    INDICATION: Left shoulder pain and limited range of motion. No resolution with  steroid injection 2 weeks ago. COMPARISON: None    TECHNIQUE: Axial and coronal proton density and T2 fat-saturated; sagittal T1  and T2 fat-saturated MRI of the left shoulder. CONTRAST: None. FINDINGS: A.C. joint: Age-appropriate moderate osteoarthritis. Mild capsular  hypertrophy and small marginal osteophytes. Anterior acromion process type: 2  with inferior osteophytes.     Bone marrow: Degenerative cyst in the humeral head is deep to the distal  supraspinatus and infraspinatus tendons and measures 1.1 cm in greatest  diameter. No acute fracture, dislocation, or marrow replacing process. Joint fluid: Small glenohumeral joint effusion extends into the  subacromial/subdeltoid bursa. Rotator cuff tendons: Full thickness tear of the distal supraspinatus tendon is  located less than 1 cm from the insertion. Gap between retracted tendon margins  measures 1.3 cm. Infraspinatus and teres minor tendons are intact. Subscapularis tendon is  intact. Biceps tendon: Indistinct intra-articular biceps tendon. Extra-articular long  head of the biceps tendon is thin. Muscles: Moderate edema is in the supraspinatus muscle. No focal muscle atrophy. Glenoid labrum: Mild age-appropriate nondisplaced degeneration. Joint capsule: within normal limits. Glenohumeral articular cartilage: Heterogeneous mild osteoarthritis. Soft tissue mass: None. Impression IMPRESSION:   1. Full-thickness supraspinatus tendon tear is located 1 cm from its insertion. 1.3 cm retraction. The tear may be recent given the moderate muscle strain. 2. Thin, indistinct long head of the biceps tendon represents at least  high-grade partial-thickness tear. No evidence of complete tear or retraction. 3. Age-appropriate osteoarthritis. Nuclear Medicine Results (maximum last 3): No results found for this or any previous visit. US Results (maximum last 3): Results from [place] encounter on 12/11/18   US PARACENTESIS ABD W IMAGING    Narrative Clinical indication: Ascites. Ultrasound was provided for a diagnostic paracentesis performed in the right  lower quadrant by Dr. Olivia Morris. Impression IMPRESSION: Ultrasound-guided paracentesis. [facility] Results (most recent):  No results found for this or any previous visit. VAS/US Results (maximum last 3): No results found for this or any previous visit. PET Results (maximum last 3): No results found for this or any previous visit.     No results found for any visits on 01/18/19. No orders of the defined types were placed in this encounter. ASSESSMENT and PLAN:  Metastatic Pancreatic Adenocarcinoma--peritoneal metastasis--Discussed side effects of FOLFIRINOX--wants to proceed. Needs port, CT is scheduled, repeat labs including . Goal: prolong survival and improvement in symptoms--Palliative.      Neoplasm Pain: well controlled with tramadol    Follow-up Disposition: Not on Balwinder Bernal MD

## 2019-01-22 ENCOUNTER — DOCUMENTATION ONLY (OUTPATIENT)
Dept: ONCOLOGY | Age: 79
End: 2019-01-22

## 2019-01-22 NOTE — PROGRESS NOTES
Meghan Gómez is a 77 y.o. male and his wife, Ricardo Councilman came to office for chemo education. Chemo packett given, reviewed chemo care education for Oxaliplatin,, Irinotecanm Leucovorin and Fluorouracil, includng side effects and symptom management. Questions answered. Patient is scheduled for port placement tomorrow at [facility]. Consent for palliative therapy obtained.

## 2019-01-23 ENCOUNTER — HOSPITAL ENCOUNTER (OUTPATIENT)
Dept: INTERVENTIONAL RADIOLOGY/VASCULAR | Age: 79
Discharge: HOME | End: 2019-01-23
Attending: INTERNAL MEDICINE
Payer: MEDICARE

## 2019-01-23 VITALS
DIASTOLIC BLOOD PRESSURE: 64 MMHG | HEIGHT: 72 IN | BODY MASS INDEX: 24.38 KG/M2 | WEIGHT: 180 LBS | HEART RATE: 66 BPM | OXYGEN SATURATION: 94 % | SYSTOLIC BLOOD PRESSURE: 108 MMHG | RESPIRATION RATE: 18 BRPM | TEMPERATURE: 98.5 F

## 2019-01-23 DIAGNOSIS — C25.0 MALIGNANT NEOPLASM OF HEAD OF PANCREAS (HCC): ICD-10-CM

## 2019-01-23 PROCEDURE — 74011000250 HC RX REV CODE- 250: Performed by: STUDENT IN AN ORGANIZED HEALTH CARE EDUCATION/TRAINING PROGRAM

## 2019-01-23 PROCEDURE — C1788 PORT, INDWELLING, IMP: HCPCS

## 2019-01-23 PROCEDURE — 74011250636 HC RX REV CODE- 250/636: Performed by: STUDENT IN AN ORGANIZED HEALTH CARE EDUCATION/TRAINING PROGRAM

## 2019-01-23 PROCEDURE — 77030031139 HC SUT VCRL2 J&J -A

## 2019-01-23 PROCEDURE — 36560 INSERT TUNNELED CV CATH: CPT

## 2019-01-23 PROCEDURE — C1892 INTRO/SHEATH,FIXED,PEEL-AWAY: HCPCS

## 2019-01-23 PROCEDURE — 77030039266 HC ADH SKN EXOFIN S2SG -A

## 2019-01-23 RX ORDER — LIDOCAINE HYDROCHLORIDE AND EPINEPHRINE 10; 10 MG/ML; UG/ML
20 INJECTION, SOLUTION INFILTRATION; PERINEURAL ONCE
Status: COMPLETED | OUTPATIENT
Start: 2019-01-23 | End: 2019-01-23

## 2019-01-23 RX ORDER — HEPARIN SODIUM 200 [USP'U]/100ML
400 INJECTION, SOLUTION INTRAVENOUS ONCE
Status: COMPLETED | OUTPATIENT
Start: 2019-01-23 | End: 2019-01-23

## 2019-01-23 RX ORDER — SODIUM CHLORIDE 9 MG/ML
25 INJECTION, SOLUTION INTRAVENOUS CONTINUOUS
Status: DISCONTINUED | OUTPATIENT
Start: 2019-01-23 | End: 2019-01-27 | Stop reason: HOSPADM

## 2019-01-23 RX ORDER — CEFAZOLIN SODIUM/WATER 2 G/20 ML
SYRINGE (ML) INTRAVENOUS
Status: DISCONTINUED
Start: 2019-01-23 | End: 2019-01-27 | Stop reason: HOSPADM

## 2019-01-23 RX ORDER — LIDOCAINE HYDROCHLORIDE 20 MG/ML
18 INJECTION, SOLUTION INFILTRATION; PERINEURAL ONCE
Status: COMPLETED | OUTPATIENT
Start: 2019-01-23 | End: 2019-01-23

## 2019-01-23 RX ORDER — CEFAZOLIN SODIUM/WATER 2 G/20 ML
2 SYRINGE (ML) INTRAVENOUS ONCE
Status: COMPLETED | OUTPATIENT
Start: 2019-01-23 | End: 2019-01-23

## 2019-01-23 RX ORDER — HEPARIN 100 UNIT/ML
300 SYRINGE INTRAVENOUS ONCE
Status: COMPLETED | OUTPATIENT
Start: 2019-01-23 | End: 2019-01-23

## 2019-01-23 RX ORDER — FENTANYL CITRATE 50 UG/ML
100 INJECTION, SOLUTION INTRAMUSCULAR; INTRAVENOUS
Status: DISCONTINUED | OUTPATIENT
Start: 2019-01-23 | End: 2019-01-27 | Stop reason: HOSPADM

## 2019-01-23 RX ORDER — MIDAZOLAM HYDROCHLORIDE 1 MG/ML
5 INJECTION, SOLUTION INTRAMUSCULAR; INTRAVENOUS
Status: DISCONTINUED | OUTPATIENT
Start: 2019-01-23 | End: 2019-01-27 | Stop reason: HOSPADM

## 2019-01-23 RX ADMIN — SODIUM CHLORIDE, PRESERVATIVE FREE 300 UNITS: 5 INJECTION INTRAVENOUS at 08:36

## 2019-01-23 RX ADMIN — FENTANYL CITRATE 50 MCG: 50 INJECTION, SOLUTION INTRAMUSCULAR; INTRAVENOUS at 08:12

## 2019-01-23 RX ADMIN — LIDOCAINE HYDROCHLORIDE AND EPINEPHRINE 200 MG: 10; 10 INJECTION, SOLUTION INFILTRATION; PERINEURAL at 08:36

## 2019-01-23 RX ADMIN — FENTANYL CITRATE 25 MCG: 50 INJECTION, SOLUTION INTRAMUSCULAR; INTRAVENOUS at 08:17

## 2019-01-23 RX ADMIN — HEPARIN SODIUM IN SODIUM CHLORIDE 200 UNITS: 200 INJECTION INTRAVENOUS at 08:36

## 2019-01-23 RX ADMIN — Medication 2 G: at 08:00

## 2019-01-23 RX ADMIN — LIDOCAINE HYDROCHLORIDE 200 MG: 20 INJECTION, SOLUTION INFILTRATION; PERINEURAL at 08:36

## 2019-01-23 RX ADMIN — MIDAZOLAM HYDROCHLORIDE 2 MG: 1 INJECTION, SOLUTION INTRAMUSCULAR; INTRAVENOUS at 08:11

## 2019-01-23 NOTE — H&P
Radiology History and Physical    Patient: Kenji Herrera 77 y.o. male       Chief Complaint: No chief complaint on file. History of Present Illness: Port placement for chemotherapy.     History:    Past Medical History:   Diagnosis Date    CAD (coronary artery disease)     3 cardiac stents   heart Dr. Ganga Sena Cervical spondylosis without myelopathy 2009    Chronic pain     shoulder left, upper lumbar    Depressive disorder, not elsewhere classified     Diaphragmatic hernia without mention of obstruction or gangrene     GERD (gastroesophageal reflux disease)     Hypercholesterolemia     Hypertension     Hypertrophy of prostate without urinary obstruction and other lower urinary tract symptoms (LUTS) 2008    Insomnia, unspecified 2009    Lumbosacral spondylosis without myelopathy 2009    Osteoarthrosis involving, or with mention of more than one site, but not specified as generalized, multiple sites 2010    Varicose veins 12/11/2014     Family History   Problem Relation Age of Onset    Hypertension Father     Heart Disease Father     Arthritis-osteo Mother     Cancer Brother 78        tumor in back    Heart Disease Brother     Diabetes Maternal Grandfather     Kidney Disease Maternal Grandfather      Social History     Socioeconomic History    Marital status:      Spouse name: Not on file    Number of children: Not on file    Years of education: Not on file    Highest education level: Not on file   Social Needs    Financial resource strain: Not on file    Food insecurity - worry: Not on file    Food insecurity - inability: Not on file   [employer] needs - medical: Not on file   [employer] needs - non-medical: Not on file   Occupational History    Not on file   Tobacco Use    Smoking status: Former Smoker     Packs/day: 1.00     Years: 10.00     Pack years: 10.00     Types: Cigarettes     Start date: 1/1/1961     Last attempt to quit: 1/1/1971     Years since quittin.0    Smokeless tobacco: Never Used   Substance and Sexual Activity    Alcohol use: No     Frequency: Never     Binge frequency: Never    Drug use: No    Sexual activity: Yes   Other Topics Concern     Service No    Blood Transfusions No    Caffeine Concern No    Occupational Exposure Yes    Hobby Hazards No    Sleep Concern Not Asked    Stress Concern No    Weight Concern Yes    Special Diet No    Back Care No    Exercise Yes    Bike Helmet No    Seat Belt Yes    Self-Exams No   Social History Narrative    Not on file       Allergies: No Known Allergies    Current Medications:  Current Outpatient Medications   Medication Sig    dexamethasone (DECADRON) 4 mg tablet Take 8 mg by mouth daily.  ondansetron (ZOFRAN ODT) 4 mg disintegrating tablet Take 1 Tab by mouth every eight (8) hours as needed.  polyethylene glycol (MIRALAX) 17 gram/dose powder Take 17 g by mouth daily.  traMADol (ULTRAM) 50 mg tablet Take 1 Tab by mouth every six (6) hours as needed for Pain. Max Daily Amount: 200 mg.    amLODIPine (NORVASC) 5 mg tablet Take 5 mg by mouth daily.  DULoxetine (CYMBALTA) 30 mg capsule TAKE ONE CAPSULE BY MOUTH EVERY DAY    tamsulosin (FLOMAX) 0.4 mg capsule TAKE 2 CAPSULES BY MOUTH NIGHLTY    rosuvastatin (CRESTOR) 10 mg tablet Take 10 mg by mouth nightly.  cyclobenzaprine (FLEXERIL) 10 mg tablet TAKE 1 TABLET BY MOUTH THREE (3) TIMES DAILY AS NEEDED FOR MUSCLE SPASM(S).  METOPROLOL TARTRATE PO Take 12.5 mg by mouth two (2) times a day.  multivitamin (ONE A DAY) tablet Take 1 Tab by mouth daily.  aspirin 81 mg tablet Take 81 mg by mouth.  dutasteride (AVODART) 0.5 mg capsule Take 0.5 mg by mouth daily.      Current Facility-Administered Medications   Medication Dose Route Frequency    lidocaine (XYLOCAINE) 20 mg/mL (2 %) injection 360 mg  18 mL SubCUTAneous ONCE    heparin (porcine) pf 300 Units  300 Units InterCATHeter ONCE    heparinized saline 2 units/mL infusion 800 Units  400 mL Irrigation ONCE    lidocaine-EPINEPHrine (XYLOCAINE) 1 %-1:100,000 injection 200 mg  20 mL IntraDERMal ONCE        Physical Exam:  There were no vitals taken for this visit. GENERAL: alert, cooperative, no distress, appears stated age  LUNG: clear to auscultation bilaterally  HEART: regular rate and rhythm  ABD: Non tender, non distended. Alerts:    Hospital Problems  Date Reviewed: 1/18/2019    None          Laboratory:    No results for input(s): HGB, HCT, WBC, PLT, INR, BUN, CREA, K, CRCLT, HGBEXT, HCTEXT, PLTEXT in the last 72 hours. No lab exists for component: PTT, PT, INREXT      Plan of Care/Planned Procedure:  Risks, benefits, and alternatives reviewed with patient and he agrees to proceed with the procedure. Deemed appropriate or moderate sedation with versed and fentanyl.       Torres García MD

## 2019-01-23 NOTE — DISCHARGE INSTRUCTIONS
[facility]  Angiography Department      Radiologist:  Dr. Rohith Moreau       Date:   January 23, 2019      [facility] Discharge Instructions      Watch for signs of infection:    1. Redness,   2. Fever, chills,   3. Increased pain, and/or drainage from the site. If this occurs, call your physician at once. Return next week for a [employer] check:       Do not register. Proceed to the Radiology Waiting Area and let the  know you are here for a \"PORT site check\". If you have an appointment with a provider or at the infusion center in the upcoming week,  they may check your site and change the dressing for you. Keep your dressing clean and dry. Leave the dressing in place until seen here next week. Continue your previous diet and restart your regularly prescribed medications. You may take Tylenol, as directed on the label, for pain if needed. Avoid ibuprofen (Advil, Motrin) and aspirin as they may cause you to bleed. Because you received sedation, you are not to drive or sign any legal documents for the next 24 hours. Do not lift anything heavier than 5 pounds with the affected arm and avoid pushing and pulling movements for several days. If you have any questions or concerns, please call our radiology department at [phone].

## 2019-01-23 NOTE — ROUTINE PROCESS
[id number]  Patient arrived ambulatory to Radiology Recovery, alert and oriented x 4, accompanied by wife. 46  Dr. Ann Dawson at bedside explaining procedure to patient and providing patient with risks and benefits of procedure. Patient verbalized understanding and signed consent for procedure. 1181  Patient alert and oriented x 4, eating crackers and drinking ginger ale without difficulty. Patient provided with verbal and written discharge instructions, patient and wife verbalized understanding and signed consent for procedure. Patient discharged via wheelchair with wife to transport home.

## 2019-01-30 RX ORDER — ALBUTEROL SULFATE 0.83 MG/ML
2.5 SOLUTION RESPIRATORY (INHALATION) AS NEEDED
Status: CANCELLED
Start: 2019-02-04

## 2019-01-30 RX ORDER — DIPHENHYDRAMINE HYDROCHLORIDE 50 MG/ML
50 INJECTION, SOLUTION INTRAMUSCULAR; INTRAVENOUS AS NEEDED
Status: CANCELLED
Start: 2019-02-04

## 2019-01-30 RX ORDER — DEXTROSE MONOHYDRATE 50 MG/ML
25 INJECTION, SOLUTION INTRAVENOUS CONTINUOUS
Status: CANCELLED
Start: 2019-02-04

## 2019-01-30 RX ORDER — SODIUM CHLORIDE 9 MG/ML
10 INJECTION INTRAMUSCULAR; INTRAVENOUS; SUBCUTANEOUS AS NEEDED
Status: CANCELLED | OUTPATIENT
Start: 2019-02-04

## 2019-01-30 RX ORDER — ACETAMINOPHEN 325 MG/1
650 TABLET ORAL AS NEEDED
Status: CANCELLED
Start: 2019-02-04

## 2019-01-30 RX ORDER — SODIUM CHLORIDE 0.9 % (FLUSH) 0.9 %
10 SYRINGE (ML) INJECTION AS NEEDED
Status: CANCELLED
Start: 2019-02-06

## 2019-01-30 RX ORDER — ONDANSETRON 2 MG/ML
8 INJECTION INTRAMUSCULAR; INTRAVENOUS AS NEEDED
Status: CANCELLED | OUTPATIENT
Start: 2019-02-04

## 2019-01-30 RX ORDER — EPINEPHRINE 1 MG/ML
0.3 INJECTION, SOLUTION, CONCENTRATE INTRAVENOUS AS NEEDED
Status: CANCELLED | OUTPATIENT
Start: 2019-02-04

## 2019-01-30 RX ORDER — ONDANSETRON 2 MG/ML
8 INJECTION INTRAMUSCULAR; INTRAVENOUS ONCE
Status: CANCELLED | OUTPATIENT
Start: 2019-02-04 | End: 2019-02-04

## 2019-01-30 RX ORDER — ATROPINE SULFATE 0.4 MG/ML
0.4 INJECTION, SOLUTION ENDOTRACHEAL; INTRAMEDULLARY; INTRAMUSCULAR; INTRAVENOUS; SUBCUTANEOUS ONCE
Status: CANCELLED | OUTPATIENT
Start: 2019-02-04 | End: 2019-02-04

## 2019-01-30 RX ORDER — HEPARIN 100 UNIT/ML
300-500 SYRINGE INTRAVENOUS AS NEEDED
Status: CANCELLED
Start: 2019-02-06

## 2019-01-30 RX ORDER — HYDROCORTISONE SODIUM SUCCINATE 100 MG/2ML
100 INJECTION, POWDER, FOR SOLUTION INTRAMUSCULAR; INTRAVENOUS AS NEEDED
Status: CANCELLED | OUTPATIENT
Start: 2019-02-04

## 2019-01-30 RX ORDER — SODIUM CHLORIDE 0.9 % (FLUSH) 0.9 %
10 SYRINGE (ML) INJECTION AS NEEDED
Status: CANCELLED
Start: 2019-02-04

## 2019-01-30 RX ORDER — FLUOROURACIL 50 MG/ML
400 INJECTION, SOLUTION INTRAVENOUS ONCE
Status: CANCELLED
Start: 2019-02-04 | End: 2019-02-04

## 2019-01-30 RX ORDER — HEPARIN 100 UNIT/ML
300-500 SYRINGE INTRAVENOUS AS NEEDED
Status: CANCELLED
Start: 2019-02-04

## 2019-01-30 RX ORDER — SODIUM CHLORIDE 9 MG/ML
10 INJECTION INTRAMUSCULAR; INTRAVENOUS; SUBCUTANEOUS AS NEEDED
Status: CANCELLED | OUTPATIENT
Start: 2019-02-06

## 2019-02-08 ENCOUNTER — OFFICE VISIT (OUTPATIENT)
Dept: ONCOLOGY | Age: 79
End: 2019-02-08

## 2019-02-08 VITALS
DIASTOLIC BLOOD PRESSURE: 72 MMHG | SYSTOLIC BLOOD PRESSURE: 115 MMHG | HEIGHT: 72 IN | TEMPERATURE: 98 F | OXYGEN SATURATION: 95 % | BODY MASS INDEX: 24.06 KG/M2 | HEART RATE: 74 BPM | WEIGHT: 177.6 LBS | RESPIRATION RATE: 24 BRPM

## 2019-02-08 DIAGNOSIS — C25.0 MALIGNANT NEOPLASM OF HEAD OF PANCREAS (HCC): Primary | ICD-10-CM

## 2019-02-08 RX ORDER — ONDANSETRON 4 MG/1
4 TABLET, ORALLY DISINTEGRATING ORAL
Qty: 30 TAB | Refills: 5 | Status: SHIPPED | OUTPATIENT
Start: 2019-02-08 | End: 2019-02-18 | Stop reason: SDUPTHER

## 2019-02-08 RX ORDER — OMEPRAZOLE 20 MG/1
20 CAPSULE, DELAYED RELEASE ORAL DAILY
COMMUNITY

## 2019-02-08 NOTE — PROGRESS NOTES
Reason for Visit:  
Mikaela Francisco is a 77 y.o. male who is seen for follow up of Metastatic Pancreatic Cancer Treatment History: Dx: Metastatic Pancreatic Adenocarcinoma--Nov 2018--peritoneal mets Tx: FOLFIRINOX--first cycle 2/4/19 Goal: prolong survival, Cycle length: until progression, Will return to see me prior to each cycle History of Present Illness:  
Mr Glen Cazares did very well with his first cycle of FOLFIRINOX--no complaints today, mild fatigue, little queasy but small meals help, also taking ondansetron through the day as he needs.   
  
 
Past Medical History:  
Diagnosis Date  CAD (coronary artery disease) 3 cardiac stents   heart Dr. Jose E Ahmadi  Cervical spondylosis without myelopathy 2009  Chronic pain   
 shoulder left, upper lumbar  Depressive disorder, not elsewhere classified  Diaphragmatic hernia without mention of obstruction or gangrene  GERD (gastroesophageal reflux disease)  Hypercholesterolemia  Hypertension  Hypertrophy of prostate without urinary obstruction and other lower urinary tract symptoms (LUTS) 2008  Insomnia, unspecified 2009  Lumbosacral spondylosis without myelopathy 2009  Osteoarthrosis involving, or with mention of more than one site, but not specified as generalized, multiple sites 2010  Varicose veins 12/11/2014 Past Surgical History:  
Procedure Laterality Date  CABG, ARTERY-VEIN, TWO  1/9/12 CABG  
 HX APPENDECTOMY  1962  HX CATARACT REMOVAL  2015  
 bilateral  
 HX COLONOSCOPY  2010  
 x2 small beign polyps  HX COLONOSCOPY  2017  
 hyperplastic polyp  HX ENDOSCOPY  2010  
 acid reflux [address], 2001  
 3 stents  HX HEENT  1962  
 fx nose repair  HX HERNIA REPAIR  early 1980's  
 left inguinal, with mesh  HX ORTHOPAEDIC  2017  
 lumbar fusion  HX OTHER SURGICAL  7 yrs. old Hiatal Hernia  HX TONSILLECTOMY  IR INSERT TUNL CVAD W PORT LESS THAN 5 YR  2019 Social History Tobacco Use  Smoking status: Former Smoker Packs/day: 1.00 Years: 10.00 Pack years: 10.00 Types: Cigarettes Start date: 1961 Last attempt to quit: 1971 Years since quittin.1  Smokeless tobacco: Never Used Substance Use Topics  Alcohol use: No  
  Frequency: Never Binge frequency: Never Family History Problem Relation Age of Onset  Hypertension Father  Heart Disease Father [address] Arthritis-osteo Mother  Cancer Brother 78  
     tumor in back  Heart Disease Brother  Diabetes Maternal Grandfather  Kidney Disease Maternal Grandfather Current Outpatient Medications Medication Sig  
 omeprazole (PRILOSEC) 20 mg capsule Take 20 mg by mouth daily.  dexamethasone (DECADRON) 4 mg tablet Take 8 mg by mouth daily.  ondansetron (ZOFRAN ODT) 4 mg disintegrating tablet Take 1 Tab by mouth every eight (8) hours as needed.  polyethylene glycol (MIRALAX) 17 gram/dose powder Take 17 g by mouth daily.  amLODIPine (NORVASC) 5 mg tablet Take 5 mg by mouth daily.  DULoxetine (CYMBALTA) 30 mg capsule TAKE ONE CAPSULE BY MOUTH EVERY DAY  tamsulosin (FLOMAX) 0.4 mg capsule TAKE 2 CAPSULES BY MOUTH NIGHLTY  rosuvastatin (CRESTOR) 10 mg tablet Take 10 mg by mouth nightly.  cyclobenzaprine (FLEXERIL) 10 mg tablet TAKE 1 TABLET BY MOUTH THREE (3) TIMES DAILY AS NEEDED FOR MUSCLE SPASM(S). (Patient taking differently: TAKE 1 TABLET BY MOUTH daily AS NEEDED FOR MUSCLE SPASM(S).)  METOPROLOL TARTRATE PO Take 12.5 mg by mouth two (2) times a day.  multivitamin (ONE A DAY) tablet Take 1 Tab by mouth daily.  aspirin 81 mg tablet Take 81 mg by mouth.  dutasteride (AVODART) 0.5 mg capsule Take 0.5 mg by mouth daily.  traMADol (ULTRAM) 50 mg tablet Take 1 Tab by mouth every six (6) hours as needed for Pain. Max Daily Amount: 200 mg. No current facility-administered medications for this visit. No Known Allergies Review of Systems: A complete review of systems was obtained, negative except as described above. Physical Exam:  
There were no vitals taken for this visit. ECOG PS: 0 General: No distress Eyes: PERRLA, anicteric sclerae HENT: Atraumatic, OP clear Neck: Supple Lymphatic: No cervical, supraclavicular, or inguinal adenopathy Respiratory: CTAB, normal respiratory effort CV: Normal rate, regular rhythm, no murmurs, no peripheral edema GI: Soft, nontender, nondistended, no masses, no hepatomegaly, no splenomegaly MS: Normal gait and station. Digits without clubbing or cyanosis. Skin: No rashes, ecchymoses, or petechiae. Normal temperature, turgor, and texture. Psych: Alert, oriented, appropriate affect, normal judgment/insight Results:  
 
Lab Results Component Value Date/Time WBC 6.0 02/04/2019 08:50 AM  
 HGB 12.6 02/04/2019 08:50 AM  
 HCT 38.4 02/04/2019 08:50 AM  
 PLATELET 311 60/97/9472 08:50 AM  
 MCV 91.6 02/04/2019 08:50 AM  
 ABS. NEUTROPHILS 4.4 02/04/2019 08:50 AM  
 
Lab Results Component Value Date/Time Sodium 140 02/04/2019 08:50 AM  
 Potassium 4.2 02/04/2019 08:50 AM  
 Chloride 105 02/04/2019 08:50 AM  
 CO2 27 02/04/2019 08:50 AM  
 Glucose 102 (H) 02/04/2019 08:50 AM  
 BUN 19 02/04/2019 08:50 AM  
 Creatinine 0.89 02/04/2019 08:50 AM  
 GFR est AA >60 02/04/2019 08:50 AM  
 GFR est non-AA >60 02/04/2019 08:50 AM  
 Calcium 8.9 02/04/2019 08:50 AM  
 Glucose (POC) 112 (H) 01/12/2012 06:32 AM  
 
Lab Results Component Value Date/Time Bilirubin, total 0.3 02/04/2019 08:50 AM  
 ALT (SGPT) 25 02/04/2019 08:50 AM  
 AST (SGOT) 24 02/04/2019 08:50 AM  
 Alk. phosphatase 58 02/04/2019 08:50 AM  
 Protein, total 6.4 02/04/2019 08:50 AM  
 Albumin 3.3 (L) 02/04/2019 08:50 AM  
 Globulin 3.1 02/04/2019 08:50 AM  
 
 
Records reviewed and summarized above. Pathology report(s) reviewed above. Radiology report(s) reviewed above. Assessment:  
1) Metastatic Pancreatic Carcinoma with Peritoneal Metastasis 2) Neoplasm Pain Plan:  
1) Continue with FOLFIRINOX--next dose 2/18--cautioned symptoms usually peak at about 7 days. Will contact us if things change. Will plan on seeing him prior to cycle #3 2) Pain is well controlled currently--not requiring tramadol at this time.  
 
 
Signed By: Joanie Ortega MD

## 2019-02-08 NOTE — PROGRESS NOTES
Veronica Murrieta is a 77 y.o. male his stomach is quezzy during the day, he is taking his Zofran in the morning and PRN. Eating small meals. Lack of energy. Patient began chemo on 2/4/19, C1D1 of Folfirinox. His next cycle is scheduled for 2/18/19. Chemotherapy Flowsheet 2/6/2019 Cycle C1D3 Date 2/6/2019 Drug / Regimen -  
Pre Meds -  
Notes CIV pump d/c'd  
 
Key Oncology Meds   
    
  
 ondansetron (ZOFRAN ODT) 4 mg disintegrating tablet Take 1 Tab by mouth every eight (8) hours as needed.

## 2019-02-13 RX ORDER — ACETAMINOPHEN 325 MG/1
650 TABLET ORAL AS NEEDED
Status: CANCELLED
Start: 2019-02-18

## 2019-02-13 RX ORDER — ONDANSETRON 2 MG/ML
8 INJECTION INTRAMUSCULAR; INTRAVENOUS ONCE
Status: CANCELLED | OUTPATIENT
Start: 2019-02-18 | End: 2019-02-18

## 2019-02-13 RX ORDER — EPINEPHRINE 1 MG/ML
0.3 INJECTION, SOLUTION, CONCENTRATE INTRAVENOUS AS NEEDED
Status: CANCELLED | OUTPATIENT
Start: 2019-02-18

## 2019-02-13 RX ORDER — DEXTROSE MONOHYDRATE 50 MG/ML
25 INJECTION, SOLUTION INTRAVENOUS CONTINUOUS
Status: CANCELLED
Start: 2019-02-18

## 2019-02-13 RX ORDER — SODIUM CHLORIDE 9 MG/ML
10 INJECTION INTRAMUSCULAR; INTRAVENOUS; SUBCUTANEOUS AS NEEDED
Status: CANCELLED | OUTPATIENT
Start: 2019-02-20

## 2019-02-13 RX ORDER — FAMOTIDINE 20 MG/50ML
20 INJECTION, SOLUTION INTRAVENOUS AS NEEDED
Status: CANCELLED
Start: 2019-02-18

## 2019-02-13 RX ORDER — HYDROCORTISONE SODIUM SUCCINATE 100 MG/2ML
100 INJECTION, POWDER, FOR SOLUTION INTRAMUSCULAR; INTRAVENOUS AS NEEDED
Status: CANCELLED | OUTPATIENT
Start: 2019-02-18

## 2019-02-13 RX ORDER — ONDANSETRON 2 MG/ML
8 INJECTION INTRAMUSCULAR; INTRAVENOUS AS NEEDED
Status: CANCELLED | OUTPATIENT
Start: 2019-02-18

## 2019-02-13 RX ORDER — HEPARIN 100 UNIT/ML
300-500 SYRINGE INTRAVENOUS AS NEEDED
Status: CANCELLED
Start: 2019-02-20

## 2019-02-13 RX ORDER — SODIUM CHLORIDE 9 MG/ML
10 INJECTION INTRAMUSCULAR; INTRAVENOUS; SUBCUTANEOUS AS NEEDED
Status: CANCELLED | OUTPATIENT
Start: 2019-02-18

## 2019-02-13 RX ORDER — DIPHENHYDRAMINE HYDROCHLORIDE 50 MG/ML
50 INJECTION, SOLUTION INTRAMUSCULAR; INTRAVENOUS AS NEEDED
Status: CANCELLED
Start: 2019-02-18

## 2019-02-13 RX ORDER — FLUOROURACIL 50 MG/ML
400 INJECTION, SOLUTION INTRAVENOUS ONCE
Status: CANCELLED
Start: 2019-02-18 | End: 2019-02-18

## 2019-02-13 RX ORDER — SODIUM CHLORIDE 0.9 % (FLUSH) 0.9 %
10 SYRINGE (ML) INJECTION AS NEEDED
Status: CANCELLED
Start: 2019-02-20

## 2019-02-13 RX ORDER — HEPARIN 100 UNIT/ML
300-500 SYRINGE INTRAVENOUS AS NEEDED
Status: CANCELLED
Start: 2019-02-18

## 2019-02-13 RX ORDER — SODIUM CHLORIDE 0.9 % (FLUSH) 0.9 %
10 SYRINGE (ML) INJECTION AS NEEDED
Status: CANCELLED
Start: 2019-02-18

## 2019-02-13 RX ORDER — ALBUTEROL SULFATE 0.83 MG/ML
2.5 SOLUTION RESPIRATORY (INHALATION) AS NEEDED
Status: CANCELLED
Start: 2019-02-18

## 2019-02-13 RX ORDER — ATROPINE SULFATE 0.4 MG/ML
0.4 INJECTION, SOLUTION ENDOTRACHEAL; INTRAMEDULLARY; INTRAMUSCULAR; INTRAVENOUS; SUBCUTANEOUS ONCE
Status: CANCELLED | OUTPATIENT
Start: 2019-02-18 | End: 2019-02-18

## 2019-02-22 RX ORDER — TAMSULOSIN HYDROCHLORIDE 0.4 MG/1
CAPSULE ORAL
Qty: 180 CAP | Refills: 1 | Status: SHIPPED | OUTPATIENT
Start: 2019-02-22 | End: 2019-02-25 | Stop reason: SDUPTHER

## 2019-02-27 RX ORDER — DEXTROSE MONOHYDRATE 50 MG/ML
25 INJECTION, SOLUTION INTRAVENOUS CONTINUOUS
Status: CANCELLED
Start: 2019-03-04

## 2019-02-27 RX ORDER — DIPHENHYDRAMINE HYDROCHLORIDE 50 MG/ML
50 INJECTION, SOLUTION INTRAMUSCULAR; INTRAVENOUS AS NEEDED
Status: CANCELLED
Start: 2019-03-04

## 2019-02-27 RX ORDER — ONDANSETRON 2 MG/ML
8 INJECTION INTRAMUSCULAR; INTRAVENOUS AS NEEDED
Status: CANCELLED | OUTPATIENT
Start: 2019-03-04

## 2019-02-27 RX ORDER — ATROPINE SULFATE 0.4 MG/ML
0.4 INJECTION, SOLUTION ENDOTRACHEAL; INTRAMEDULLARY; INTRAMUSCULAR; INTRAVENOUS; SUBCUTANEOUS ONCE
Status: CANCELLED | OUTPATIENT
Start: 2019-03-04 | End: 2019-03-04

## 2019-02-27 RX ORDER — HYDROCORTISONE SODIUM SUCCINATE 100 MG/2ML
100 INJECTION, POWDER, FOR SOLUTION INTRAMUSCULAR; INTRAVENOUS AS NEEDED
Status: CANCELLED | OUTPATIENT
Start: 2019-03-04

## 2019-02-27 RX ORDER — FLUOROURACIL 50 MG/ML
400 INJECTION, SOLUTION INTRAVENOUS ONCE
Status: CANCELLED
Start: 2019-03-04 | End: 2019-03-04

## 2019-02-27 RX ORDER — SODIUM CHLORIDE 0.9 % (FLUSH) 0.9 %
10 SYRINGE (ML) INJECTION AS NEEDED
Status: CANCELLED
Start: 2019-03-04

## 2019-02-27 RX ORDER — HEPARIN 100 UNIT/ML
300-500 SYRINGE INTRAVENOUS AS NEEDED
Status: CANCELLED
Start: 2019-03-04

## 2019-02-27 RX ORDER — ACETAMINOPHEN 325 MG/1
650 TABLET ORAL AS NEEDED
Status: CANCELLED
Start: 2019-03-04

## 2019-02-27 RX ORDER — EPINEPHRINE 1 MG/ML
0.3 INJECTION, SOLUTION, CONCENTRATE INTRAVENOUS AS NEEDED
Status: CANCELLED | OUTPATIENT
Start: 2019-03-04

## 2019-02-27 RX ORDER — SODIUM CHLORIDE 9 MG/ML
10 INJECTION INTRAMUSCULAR; INTRAVENOUS; SUBCUTANEOUS AS NEEDED
Status: CANCELLED | OUTPATIENT
Start: 2019-03-06

## 2019-02-27 RX ORDER — FAMOTIDINE 20 MG/50ML
20 INJECTION, SOLUTION INTRAVENOUS AS NEEDED
Status: CANCELLED
Start: 2019-03-04

## 2019-02-27 RX ORDER — SODIUM CHLORIDE 0.9 % (FLUSH) 0.9 %
10 SYRINGE (ML) INJECTION AS NEEDED
Status: CANCELLED
Start: 2019-03-06

## 2019-02-27 RX ORDER — SODIUM CHLORIDE 9 MG/ML
10 INJECTION INTRAMUSCULAR; INTRAVENOUS; SUBCUTANEOUS AS NEEDED
Status: CANCELLED | OUTPATIENT
Start: 2019-03-04

## 2019-02-27 RX ORDER — HEPARIN 100 UNIT/ML
300-500 SYRINGE INTRAVENOUS AS NEEDED
Status: CANCELLED
Start: 2019-03-06

## 2019-02-27 RX ORDER — ONDANSETRON 2 MG/ML
8 INJECTION INTRAMUSCULAR; INTRAVENOUS ONCE
Status: CANCELLED | OUTPATIENT
Start: 2019-03-04 | End: 2019-03-04

## 2019-02-27 RX ORDER — ALBUTEROL SULFATE 0.83 MG/ML
2.5 SOLUTION RESPIRATORY (INHALATION) AS NEEDED
Status: CANCELLED
Start: 2019-03-04

## 2019-03-01 ENCOUNTER — TELEPHONE (OUTPATIENT)
Dept: ONCOLOGY | Age: 79
End: 2019-03-01

## 2019-03-01 ENCOUNTER — OFFICE VISIT (OUTPATIENT)
Dept: ONCOLOGY | Age: 79
End: 2019-03-01

## 2019-03-01 VITALS
HEART RATE: 76 BPM | TEMPERATURE: 97.8 F | SYSTOLIC BLOOD PRESSURE: 100 MMHG | RESPIRATION RATE: 17 BRPM | DIASTOLIC BLOOD PRESSURE: 66 MMHG | OXYGEN SATURATION: 96 % | WEIGHT: 169.4 LBS | HEIGHT: 73 IN | BODY MASS INDEX: 22.45 KG/M2

## 2019-03-01 DIAGNOSIS — C25.0 MALIGNANT NEOPLASM OF HEAD OF PANCREAS (HCC): ICD-10-CM

## 2019-03-01 RX ORDER — ONDANSETRON 4 MG/1
4 TABLET, ORALLY DISINTEGRATING ORAL
Qty: 30 TAB | Refills: 2 | Status: SHIPPED | OUTPATIENT
Start: 2019-03-01 | End: 2019-06-26 | Stop reason: SDUPTHER

## 2019-03-01 NOTE — PROGRESS NOTES
Reason for Visit:   Rigo Caba is a 77 y.o. male who is seen for follow up metastatic pancreatic cancer    Treatment History:   Dx: Metastatic Pancreatic Adenocarcinoma--Nov 2018--peritoneal mets  Tx: FOLFIRINOX--first cycle 2/4/19, second cycle 2/18/19, due for third on 3/4/19  Goal: prolong survival, Cycle length: until progression, Will return to see me prior to each cycle    History of Present Illness:   Doing ok, having cold sensitivity. Some dypsnea that is worsening--with exertion, no sharp pains or pleuritic component. Some cold sensitivity. 1-2 bm's daily and most of the time loose. Taking zofran in the am and very rarely has to take another dose during the day. Taste has changed. Lost 8lb's since started.     Past Medical History:   Diagnosis Date    CAD (coronary artery disease)     3 cardiac stents   heart Dr. Gustabo Tee Cervical spondylosis without myelopathy 2009    Chronic pain     shoulder left, upper lumbar    Depressive disorder, not elsewhere classified     Diaphragmatic hernia without mention of obstruction or gangrene     GERD (gastroesophageal reflux disease)     Hypercholesterolemia     Hypertension     Hypertrophy of prostate without urinary obstruction and other lower urinary tract symptoms (LUTS) 2008    Insomnia, unspecified 2009    Lumbosacral spondylosis without myelopathy 2009    Osteoarthrosis involving, or with mention of more than one site, but not specified as generalized, multiple sites 2010    Varicose veins 12/11/2014      Past Surgical History:   Procedure Laterality Date    CABG, ARTERY-VEIN, TWO  1/9/12    CABG    HX APPENDECTOMY  1962    HX CATARACT REMOVAL  2015    bilateral    HX COLONOSCOPY  2010    x2 small beign polyps    HX COLONOSCOPY  2017    hyperplastic polyp    HX ENDOSCOPY  2010    acid reflux    HX HEART CATHETERIZATION  1998, 2001    3 stents    HX HEENT  1962    fx nose repair    HX HERNIA REPAIR  early 1980's left inguinal, with mesh    HX ORTHOPAEDIC  2017    lumbar fusion    HX OTHER SURGICAL  7 yrs. old    Hiatal Hernia    HX TONSILLECTOMY      HX VASCULAR ACCESS      IR INSERT TUNL CVAD W PORT LESS THAN 5 YR  2019      Social History     Tobacco Use    Smoking status: Former Smoker     Packs/day: 1.00     Years: 10.00     Pack years: 10.00     Types: Cigarettes     Start date: 1961     Last attempt to quit: 1971     Years since quittin.1    Smokeless tobacco: Never Used   Substance Use Topics    Alcohol use: No     Frequency: Never     Binge frequency: Never      Family History   Problem Relation Age of Onset    Hypertension Father     Heart Disease Father     Arthritis-osteo Mother     Cancer Brother 78        tumor in back    Heart Disease Brother     Diabetes Maternal Grandfather     Kidney Disease Maternal Grandfather      Current Outpatient Medications   Medication Sig    traMADol (ULTRAM) 50 mg tablet TAKE 1 TABLET BY MOUTH EVERY 6 HOURS AS NEEDED FOR PAIN MAX DAILY AMOUNT 200MG    ondansetron (ZOFRAN ODT) 4 mg disintegrating tablet Take 1 Tab by mouth every eight (8) hours as needed.  omeprazole (PRILOSEC) 20 mg capsule Take 20 mg by mouth daily.  dexamethasone (DECADRON) 4 mg tablet Take 8 mg by mouth daily.  polyethylene glycol (MIRALAX) 17 gram/dose powder Take 17 g by mouth daily.  amLODIPine (NORVASC) 5 mg tablet Take 5 mg by mouth daily.  DULoxetine (CYMBALTA) 30 mg capsule TAKE ONE CAPSULE BY MOUTH EVERY DAY    tamsulosin (FLOMAX) 0.4 mg capsule TAKE 2 CAPSULES BY MOUTH NIGHLTY    rosuvastatin (CRESTOR) 10 mg tablet Take 10 mg by mouth nightly.  cyclobenzaprine (FLEXERIL) 10 mg tablet TAKE 1 TABLET BY MOUTH THREE (3) TIMES DAILY AS NEEDED FOR MUSCLE SPASM(S). (Patient taking differently: TAKE 1 TABLET BY MOUTH daily AS NEEDED FOR MUSCLE SPASM(S).)    METOPROLOL TARTRATE PO Take 12.5 mg by mouth two (2) times a day.     multivitamin (ONE A DAY) tablet Take 1 Tab by mouth daily.  aspirin 81 mg tablet Take 81 mg by mouth.  dutasteride (AVODART) 0.5 mg capsule Take 0.5 mg by mouth daily. No current facility-administered medications for this visit. No Known Allergies     Review of Systems: A complete review of systems was obtained, negative except as described above. Physical Exam:   There were no vitals taken for this visit. ECOG PS: 1-2  General: No distress  Eyes: PERRLA, anicteric sclerae  HENT: Atraumatic, OP clear  Neck: Supple  Lymphatic: No cervical, supraclavicular, or inguinal adenopathy  Respiratory: CTAB, normal respiratory effort  CV: Normal rate, regular rhythm, no murmurs, no peripheral edema  GI: Soft, nontender, nondistended, no masses, no hepatomegaly, no splenomegaly  MS: Normal gait and station. Digits without clubbing or cyanosis. Skin: No rashes, ecchymoses, or petechiae. Normal temperature, turgor, and texture. Psych: Alert, oriented, appropriate affect, normal judgment/insight    Results:     Lab Results   Component Value Date/Time    WBC 3.0 (L) 02/18/2019 09:10 AM    HGB 11.4 (L) 02/18/2019 09:10 AM    HCT 34.7 (L) 02/18/2019 09:10 AM    PLATELET 125 63/94/6613 09:10 AM    MCV 90.8 02/18/2019 09:10 AM    ABS. NEUTROPHILS 2.1 02/18/2019 09:10 AM     Lab Results   Component Value Date/Time    Sodium 141 02/18/2019 09:10 AM    Potassium 4.2 02/18/2019 09:10 AM    Chloride 106 02/18/2019 09:10 AM    CO2 28 02/18/2019 09:10 AM    Glucose 116 (H) 02/18/2019 09:10 AM    BUN 15 02/18/2019 09:10 AM    Creatinine 0.83 02/18/2019 09:10 AM    GFR est AA >60 02/18/2019 09:10 AM    GFR est non-AA >60 02/18/2019 09:10 AM    Calcium 8.3 (L) 02/18/2019 09:10 AM    Glucose (POC) 112 (H) 01/12/2012 06:32 AM     Lab Results   Component Value Date/Time    Bilirubin, total 0.2 02/18/2019 09:10 AM    ALT (SGPT) 18 02/18/2019 09:10 AM    AST (SGOT) 16 02/18/2019 09:10 AM    Alk.  phosphatase 56 02/18/2019 09:10 AM    Protein, total 6.0 (L) 02/18/2019 09:10 AM    Albumin 2.9 (L) 02/18/2019 09:10 AM    Globulin 3.1 02/18/2019 09:10 AM       CT A/P 1/16/2019  PANCREAS: No change in the 2 x 2.3 x 2.5 cm hypodense mass abutting the superior  mesenteric vein (image 32). Stable main pancreatic ductal dilatation up to 15 mm  (image 31). Impression:   Stable pancreatic mass with associated pancreatic duct dilatation and  carcinomatosis   No evidence for metastatic disease to the chest  Incidental diffuse pyloric thickening. Please correlate clinically. Assessment:   1) Metastatic Pancreatic Carcinoma with Peritoneal Metastasis  2) Neoplasm Pain  3) Dyspnea    Plan:   1) Continue with FOLFIRINOX--next dose 3/4--cautioned symptoms usually peak at about 7 days. Will contact us if things change. Add neulasta day 3 each cycle. Will plan on seeing him prior to cycle #5  2) Pain is well controlled currently--not requiring tramadol at this time. 3) PE CT today    Signed By: Stefanie Peoples MD       CT returned bilateral PE's--starting xarelto.

## 2019-03-01 NOTE — TELEPHONE ENCOUNTER
Received call from Dr Ousmane De La Rosa, positive results for this patient. Tj [facility]. Notified Dr Asad Flores at [phone], he will contact patient.

## 2019-03-01 NOTE — PROGRESS NOTES
Snow Anaya is a 77 y.o. male, here to today for f/u pancreatic cancer, his last chemo was 2/18/19 he is receiving Folfirinox. Patient c/o SOB which he believes began before he started chemotherapy. No changes in sleep position, but he does have to stop when walking on occasion due to SOB. Patient has experienced some loose stools, usually 1-2 times daily. He takes Zofran for nausea every morning, and a couple times a week he has to taken another zofran for nausea, but not daily. Cold sensitivity began after the last infusion and lasted for 1.5 weeks. He has noted this in his hands, fingers and throat. He feels it is \"pretty much better now , just in time for my next infusion. \"  He has noted a metallic taste, which is making him choose different food. Since 2/4/19 he is down 8 pounds. He is trying to drink, but feels he is not taking in as much as he should. He is trying gatoraide now to help.     Chemotherapy Flowsheet 2/20/2019   Cycle C2 D3   Date 2/20/2019   Drug / Regimen -   Dosage -   Time Up -   Time Down -   Pre Meds -   Notes CIV 5FU pump d/c'd

## 2019-03-13 RX ORDER — SODIUM CHLORIDE 0.9 % (FLUSH) 0.9 %
10 SYRINGE (ML) INJECTION AS NEEDED
Status: CANCELLED
Start: 2019-03-20

## 2019-03-13 RX ORDER — HEPARIN 100 UNIT/ML
300-500 SYRINGE INTRAVENOUS AS NEEDED
Status: CANCELLED
Start: 2019-03-20

## 2019-03-13 RX ORDER — EPINEPHRINE 1 MG/ML
0.3 INJECTION, SOLUTION, CONCENTRATE INTRAVENOUS AS NEEDED
Status: CANCELLED | OUTPATIENT
Start: 2019-03-18

## 2019-03-13 RX ORDER — DIPHENHYDRAMINE HYDROCHLORIDE 50 MG/ML
50 INJECTION, SOLUTION INTRAMUSCULAR; INTRAVENOUS AS NEEDED
Status: CANCELLED
Start: 2019-03-18

## 2019-03-13 RX ORDER — ONDANSETRON 2 MG/ML
8 INJECTION INTRAMUSCULAR; INTRAVENOUS ONCE
Status: CANCELLED | OUTPATIENT
Start: 2019-03-18 | End: 2019-03-18

## 2019-03-13 RX ORDER — FAMOTIDINE 20 MG/50ML
20 INJECTION, SOLUTION INTRAVENOUS AS NEEDED
Status: CANCELLED
Start: 2019-03-18

## 2019-03-13 RX ORDER — ATROPINE SULFATE 0.4 MG/ML
0.4 INJECTION, SOLUTION ENDOTRACHEAL; INTRAMEDULLARY; INTRAMUSCULAR; INTRAVENOUS; SUBCUTANEOUS ONCE
Status: CANCELLED | OUTPATIENT
Start: 2019-03-18 | End: 2019-03-18

## 2019-03-13 RX ORDER — DEXTROSE MONOHYDRATE 50 MG/ML
25 INJECTION, SOLUTION INTRAVENOUS CONTINUOUS
Status: CANCELLED
Start: 2019-03-18

## 2019-03-13 RX ORDER — ACETAMINOPHEN 325 MG/1
650 TABLET ORAL AS NEEDED
Status: CANCELLED
Start: 2019-03-18

## 2019-03-13 RX ORDER — FLUOROURACIL 50 MG/ML
400 INJECTION, SOLUTION INTRAVENOUS ONCE
Status: CANCELLED
Start: 2019-03-18 | End: 2019-03-18

## 2019-03-13 RX ORDER — HEPARIN 100 UNIT/ML
300-500 SYRINGE INTRAVENOUS AS NEEDED
Status: CANCELLED
Start: 2019-03-18

## 2019-03-13 RX ORDER — HYDROCORTISONE SODIUM SUCCINATE 100 MG/2ML
100 INJECTION, POWDER, FOR SOLUTION INTRAMUSCULAR; INTRAVENOUS AS NEEDED
Status: CANCELLED | OUTPATIENT
Start: 2019-03-18

## 2019-03-13 RX ORDER — SODIUM CHLORIDE 0.9 % (FLUSH) 0.9 %
10 SYRINGE (ML) INJECTION AS NEEDED
Status: CANCELLED
Start: 2019-03-18

## 2019-03-13 RX ORDER — SODIUM CHLORIDE 9 MG/ML
10 INJECTION INTRAMUSCULAR; INTRAVENOUS; SUBCUTANEOUS AS NEEDED
Status: CANCELLED | OUTPATIENT
Start: 2019-03-20

## 2019-03-13 RX ORDER — ONDANSETRON 2 MG/ML
8 INJECTION INTRAMUSCULAR; INTRAVENOUS AS NEEDED
Status: CANCELLED | OUTPATIENT
Start: 2019-03-18

## 2019-03-13 RX ORDER — SODIUM CHLORIDE 9 MG/ML
10 INJECTION INTRAMUSCULAR; INTRAVENOUS; SUBCUTANEOUS AS NEEDED
Status: CANCELLED | OUTPATIENT
Start: 2019-03-18

## 2019-03-13 RX ORDER — ALBUTEROL SULFATE 0.83 MG/ML
2.5 SOLUTION RESPIRATORY (INHALATION) AS NEEDED
Status: CANCELLED
Start: 2019-03-18

## 2019-03-22 RX ORDER — FLUOROURACIL 50 MG/ML
400 INJECTION, SOLUTION INTRAVENOUS ONCE
Status: CANCELLED
Start: 2019-04-01 | End: 2019-04-01

## 2019-03-22 RX ORDER — HEPARIN 100 UNIT/ML
300-500 SYRINGE INTRAVENOUS AS NEEDED
Status: CANCELLED
Start: 2019-04-03

## 2019-03-22 RX ORDER — ALBUTEROL SULFATE 0.83 MG/ML
2.5 SOLUTION RESPIRATORY (INHALATION) AS NEEDED
Status: CANCELLED
Start: 2019-04-01

## 2019-03-22 RX ORDER — PALONOSETRON 0.05 MG/ML
0.25 INJECTION, SOLUTION INTRAVENOUS ONCE
Status: CANCELLED | OUTPATIENT
Start: 2019-04-01

## 2019-03-22 RX ORDER — ACETAMINOPHEN 325 MG/1
650 TABLET ORAL AS NEEDED
Status: CANCELLED
Start: 2019-04-01

## 2019-03-22 RX ORDER — FAMOTIDINE 20 MG/50ML
20 INJECTION, SOLUTION INTRAVENOUS AS NEEDED
Status: CANCELLED
Start: 2019-04-01

## 2019-03-22 RX ORDER — ATROPINE SULFATE 0.4 MG/ML
0.4 INJECTION, SOLUTION ENDOTRACHEAL; INTRAMEDULLARY; INTRAMUSCULAR; INTRAVENOUS; SUBCUTANEOUS ONCE
Status: CANCELLED | OUTPATIENT
Start: 2019-04-01

## 2019-03-22 RX ORDER — HYDROCORTISONE SODIUM SUCCINATE 100 MG/2ML
100 INJECTION, POWDER, FOR SOLUTION INTRAMUSCULAR; INTRAVENOUS AS NEEDED
Status: CANCELLED | OUTPATIENT
Start: 2019-04-01

## 2019-03-22 RX ORDER — SODIUM CHLORIDE 0.9 % (FLUSH) 0.9 %
10 SYRINGE (ML) INJECTION AS NEEDED
Status: CANCELLED
Start: 2019-04-01

## 2019-03-22 RX ORDER — HEPARIN 100 UNIT/ML
300-500 SYRINGE INTRAVENOUS AS NEEDED
Status: CANCELLED
Start: 2019-04-01

## 2019-03-22 RX ORDER — DIPHENHYDRAMINE HYDROCHLORIDE 50 MG/ML
50 INJECTION, SOLUTION INTRAMUSCULAR; INTRAVENOUS AS NEEDED
Status: CANCELLED
Start: 2019-04-01

## 2019-03-22 RX ORDER — DEXTROSE MONOHYDRATE 50 MG/ML
25 INJECTION, SOLUTION INTRAVENOUS CONTINUOUS
Status: CANCELLED
Start: 2019-04-01

## 2019-03-22 RX ORDER — ONDANSETRON 2 MG/ML
8 INJECTION INTRAMUSCULAR; INTRAVENOUS AS NEEDED
Status: CANCELLED | OUTPATIENT
Start: 2019-04-01

## 2019-03-22 RX ORDER — SODIUM CHLORIDE 0.9 % (FLUSH) 0.9 %
10 SYRINGE (ML) INJECTION AS NEEDED
Status: CANCELLED
Start: 2019-04-03

## 2019-03-22 RX ORDER — SODIUM CHLORIDE 9 MG/ML
10 INJECTION INTRAMUSCULAR; INTRAVENOUS; SUBCUTANEOUS AS NEEDED
Status: CANCELLED | OUTPATIENT
Start: 2019-04-03

## 2019-03-22 RX ORDER — SODIUM CHLORIDE 9 MG/ML
10 INJECTION INTRAMUSCULAR; INTRAVENOUS; SUBCUTANEOUS AS NEEDED
Status: CANCELLED | OUTPATIENT
Start: 2019-04-01

## 2019-03-22 RX ORDER — EPINEPHRINE 1 MG/ML
0.3 INJECTION, SOLUTION, CONCENTRATE INTRAVENOUS AS NEEDED
Status: CANCELLED | OUTPATIENT
Start: 2019-04-01

## 2019-03-29 ENCOUNTER — OFFICE VISIT (OUTPATIENT)
Dept: ONCOLOGY | Age: 79
End: 2019-03-29

## 2019-03-29 VITALS
RESPIRATION RATE: 20 BRPM | OXYGEN SATURATION: 96 % | HEIGHT: 72 IN | WEIGHT: 171.52 LBS | SYSTOLIC BLOOD PRESSURE: 108 MMHG | DIASTOLIC BLOOD PRESSURE: 68 MMHG | TEMPERATURE: 98.2 F | BODY MASS INDEX: 23.23 KG/M2 | HEART RATE: 76 BPM

## 2019-03-29 DIAGNOSIS — I26.99 OTHER PULMONARY EMBOLISM WITHOUT ACUTE COR PULMONALE, UNSPECIFIED CHRONICITY (HCC): ICD-10-CM

## 2019-03-29 DIAGNOSIS — C25.0 MALIGNANT NEOPLASM OF HEAD OF PANCREAS (HCC): Primary | ICD-10-CM

## 2019-03-29 NOTE — PROGRESS NOTES
Elav Petersen is a 77 y.o. male here for f/u pancreatic  Cancer, he c/o feeling weak and tired, eating due to wife encouraging. He is due for C5D1 for FOLFIRINOX on 4/1/19. Chemotherapy Flowsheet 3/21/2019   Cycle C4D4   Date 3/21/2019   Drug / Regimen Neulasta   Dosage 6 mg   Time Up given   Time Down -   Pre Meds -   Notes -     Patient is taking 20mg Xarelto daily, he has approximatly 4 days remaining of the starter pack.

## 2019-03-29 NOTE — PROGRESS NOTES
Reason for Visit:   Nicky Whitaker is a 77 y.o. male who is seen for follow up metastatic pancreatic cancer and third dose of FOLFIRINOX    Treatment History:   Dx: Metastatic Pancreatic Adenocarcinoma--Nov 2018--peritoneal mets  Tx: FOLFIRINOX--first cycle 2/4/19, second cycle 2/18/19, third cycle 3/4/19, fourth cycle on 3/18/19, due for 5th on 4/1/2019  Goal: prolong survival, Cycle length: until progression, Will return to see me prior to each cycle    History of Present Illness:   Continues to do well. Has noted new low back pain--middle of low back--occurs sporadically--lasts for about an hour--heating pad makes it better--sometimes sitting or lying makes it better as well. Hx of low back issues and has hardware. Dypsnea has resolved since starting the Xarelto. Having some problems with BM's--sometimes too lose and needs imodium. Balancing imodium and miralax. Wt roughly stable.     Past Medical History:   Diagnosis Date    CAD (coronary artery disease)     3 cardiac stents   heart Dr. Abner Perea Cervical spondylosis without myelopathy 2009    Chronic pain     shoulder left, upper lumbar    Depressive disorder, not elsewhere classified     Diaphragmatic hernia without mention of obstruction or gangrene     GERD (gastroesophageal reflux disease)     Hypercholesterolemia     Hypertension     Hypertrophy of prostate without urinary obstruction and other lower urinary tract symptoms (LUTS) 2008    Insomnia, unspecified 2009    Lumbosacral spondylosis without myelopathy 2009    Osteoarthrosis involving, or with mention of more than one site, but not specified as generalized, multiple sites 2010    Varicose veins 12/11/2014      Past Surgical History:   Procedure Laterality Date    CABG, ARTERY-VEIN, TWO  1/9/12    CABG    HX APPENDECTOMY  1962    HX CATARACT REMOVAL  2015    bilateral    HX COLONOSCOPY  2010    x2 small beign polyps    HX COLONOSCOPY  2017    hyperplastic polyp    HX ENDOSCOPY  2010    acid reflux    HX HEART CATHETERIZATION  ,     3 stents    HX HEENT      fx nose repair    HX HERNIA REPAIR  early     left inguinal, with mesh    HX ORTHOPAEDIC  2017    lumbar fusion    HX OTHER SURGICAL  7 yrs. old    Hiatal Hernia    HX TONSILLECTOMY      HX VASCULAR ACCESS      IR INSERT TUNL CVAD W PORT LESS THAN 5 YR  2019      Social History     Tobacco Use    Smoking status: Former Smoker     Packs/day: 1.00     Years: 10.00     Pack years: 10.00     Types: Cigarettes     Start date: 1961     Last attempt to quit: 1971     Years since quittin.2    Smokeless tobacco: Never Used   Substance Use Topics    Alcohol use: No     Frequency: Never     Binge frequency: Never      Family History   Problem Relation Age of Onset    Hypertension Father     Heart Disease Father     Arthritis-osteo Mother     Cancer Brother 78        tumor in back    Heart Disease Brother     Diabetes Maternal Grandfather     Kidney Disease Maternal Grandfather      Current Outpatient Medications   Medication Sig    ondansetron (ZOFRAN ODT) 4 mg disintegrating tablet Take 1 Tab by mouth every eight (8) hours as needed for Nausea.  rivaroxaban (XARELTO STARTER AGNES) 15 mg (42)- 20 mg (9) DsPk Take one 15 mg tablet twice a day with food for the first 21 days. Then, take one 20 mg tablet once a day with food for 9 days.  rivaroxaban (XARELTO) 15 mg tab tablet Take 1 Tab by mouth two (2) times daily (with meals).  traMADol (ULTRAM) 50 mg tablet TAKE 1 TABLET BY MOUTH EVERY 6 HOURS AS NEEDED FOR PAIN MAX DAILY AMOUNT 200MG    omeprazole (PRILOSEC) 20 mg capsule Take 20 mg by mouth daily.  dexamethasone (DECADRON) 4 mg tablet Take 8 mg by mouth daily.  polyethylene glycol (MIRALAX) 17 gram/dose powder Take 17 g by mouth daily.  amLODIPine (NORVASC) 5 mg tablet Take 5 mg by mouth daily.     DULoxetine (CYMBALTA) 30 mg capsule TAKE ONE CAPSULE BY MOUTH EVERY DAY    tamsulosin (FLOMAX) 0.4 mg capsule TAKE 2 CAPSULES BY MOUTH NIGHLTY    rosuvastatin (CRESTOR) 10 mg tablet Take 10 mg by mouth nightly.  cyclobenzaprine (FLEXERIL) 10 mg tablet TAKE 1 TABLET BY MOUTH THREE (3) TIMES DAILY AS NEEDED FOR MUSCLE SPASM(S). (Patient taking differently: TAKE 1 TABLET BY MOUTH daily AS NEEDED FOR MUSCLE SPASM(S).)    METOPROLOL TARTRATE PO Take 12.5 mg by mouth two (2) times a day.  multivitamin (ONE A DAY) tablet Take 1 Tab by mouth daily.  aspirin 81 mg tablet Take 81 mg by mouth.  dutasteride (AVODART) 0.5 mg capsule Take 0.5 mg by mouth daily. No current facility-administered medications for this visit. No Known Allergies     Review of Systems: A complete review of systems was obtained, negative except as described above. Physical Exam:   There were no vitals taken for this visit. ECOG PS: 1  General: No distress  Eyes: PERRLA, anicteric sclerae  HENT: Atraumatic, OP clear  Neck: Supple  Lymphatic: No cervical, supraclavicular, or inguinal adenopathy  Respiratory: CTAB, normal respiratory effort  CV: Normal rate, regular rhythm, no murmurs, no peripheral edema  GI: Soft, nontender, nondistended, no masses, no hepatomegaly, no splenomegaly  MS: Normal gait and station. Digits without clubbing or cyanosis. Skin: No rashes, ecchymoses, or petechiae. Normal temperature, turgor, and texture. Psych: Alert, oriented, appropriate affect, normal judgment/insight    Results:     Lab Results   Component Value Date/Time    WBC 7.9 03/18/2019 08:45 AM    HGB 11.8 (L) 03/18/2019 08:45 AM    HCT 35.7 (L) 03/18/2019 08:45 AM    PLATELET 202 (L) 76/89/6740 08:45 AM    MCV 92.0 03/18/2019 08:45 AM    ABS.  NEUTROPHILS 6.6 03/18/2019 08:45 AM     Lab Results   Component Value Date/Time    Sodium 142 03/18/2019 08:45 AM    Potassium 4.1 03/18/2019 08:45 AM    Chloride 106 03/18/2019 08:45 AM    CO2 27 03/18/2019 08:45 AM    Glucose 101 (H) 03/18/2019 08:45 AM    BUN 13 03/18/2019 08:45 AM    Creatinine 0.95 03/18/2019 08:45 AM    GFR est AA >60 03/18/2019 08:45 AM    GFR est non-AA >60 03/18/2019 08:45 AM    Calcium 8.4 (L) 03/18/2019 08:45 AM    Glucose (POC) 112 (H) 01/12/2012 06:32 AM     Lab Results   Component Value Date/Time    Bilirubin, total 0.1 (L) 03/18/2019 08:45 AM    ALT (SGPT) 26 03/18/2019 08:45 AM    AST (SGOT) 24 03/18/2019 08:45 AM    Alk. phosphatase 77 03/18/2019 08:45 AM    Protein, total 5.9 (L) 03/18/2019 08:45 AM    Albumin 2.8 (L) 03/18/2019 08:45 AM    Globulin 3.1 03/18/2019 08:45 AM       CT A/P 1/16/2019  PANCREAS: No change in the 2 x 2.3 x 2.5 cm hypodense mass abutting the superior  mesenteric vein (image 32). Stable main pancreatic ductal dilatation up to 15 mm  (image 31). Impression:   Stable pancreatic mass with associated pancreatic duct dilatation and  carcinomatosis   No evidence for metastatic disease to the chest  Incidental diffuse pyloric thickening. Please correlate clinically. Assessment:   1) Metastatic Pancreatic Carcinoma with Peritoneal Metastasis  2) Neoplasm Pain  3) PE    Plan:   1) Continue with FOLFIRINOX--next dose 4/1--Repeat Imaging to ensure its correlating with his CA 19-9 (now down to 1500).  Will contact us if things change.  Continue neulasta day 3 each cycle. Will plan on seeing him prior to cycle #7  2) Pain is well controlled currently--not requiring tramadol at this time. Will see if there are any problems with low back when repeat imaging. Doubt this is related to the pancreatic cancer as his 19-9 has continued to fall.   3) Continue Xarelto 20mg daily        Signed By: Joanie Ortega MD

## 2019-03-29 NOTE — PATIENT INSTRUCTIONS
Orthostatic Hypotension: Care Instructions  Your Care Instructions    Orthostatic hypotension is a quick drop in blood pressure. It happens when you get up from sitting or lying down. You may feel faint, lightheaded, or dizzy. When a person sits up or stands up, the body changes the way it pumps blood. This can slow the flow of blood to the brain for a very short time. And that can make you feel lightheaded. Many medicines can cause this problem, especially in older people. Lack of fluids (dehydration) or illnesses such as diabetes or heart disease also can cause it. Follow-up care is a key part of your treatment and safety. Be sure to make and go to all appointments, and call your doctor if you are having problems. It's also a good idea to know your test results and keep a list of the medicines you take. How can you care for yourself at home? · Tell your doctor about any problems you have with your medicines. · If your doctor prescribes medicine to help prevent a low blood pressure problem, take it exactly as prescribed. Call your doctor if you think you are having a problem with your medicine. · Drink plenty of fluids, enough so that your urine is light yellow or clear like water. Choose water and other caffeine-free clear liquids. If you have kidney, heart, or liver disease and have to limit fluids, talk with your doctor before you increase the amount of fluids you drink. · Limit or avoid alcohol and caffeine. · Get up slowly from bed or after sitting for a long time. If you are in bed, roll to your side and swing your legs over the edge of the bed and onto the floor. Push your body up to a sitting position. Wait for a while before you slowly stand up. If you are dizzy or lightheaded, sit or lie down. When should you call for help? Call 911 anytime you think you may need emergency care.  For example, call if:    · You passed out (lost consciousness).    Watch closely for changes in your health, and be sure to contact your doctor if:    · You do not get better as expected. Where can you learn more? Go to http://zamzam-kun.info/. Enter O045 in the search box to learn more about \"Orthostatic Hypotension: Care Instructions. \"  Current as of: July 22, 2018  Content Version: 11.9  © 5152-0617 [employer]. Care instructions adapted under license by [employer] (which disclaims liability or warranty for this information). If you have questions about a medical condition or this instruction, always ask your healthcare professional. [address] any warranty or liability for your use of this information.

## 2019-04-01 ENCOUNTER — TELEPHONE (OUTPATIENT)
Dept: ONCOLOGY | Age: 79
End: 2019-04-01

## 2019-04-01 NOTE — TELEPHONE ENCOUNTER
Mr Lyly لاعلي was asking Igor about Xeloda, in stead of 5FU pump. He stated during last visit you mentioned it. Igor spoke briefly with patient about xeloda, and taking it daily. Patient would like to speak with you about Destiny Rebolledo, just to get more information.     Thank you

## 2019-04-03 RX ORDER — LORAZEPAM 0.5 MG/1
0.5 TABLET ORAL
Qty: 30 TAB | Refills: 1 | Status: SHIPPED | OUTPATIENT
Start: 2019-04-03

## 2019-04-10 RX ORDER — PALONOSETRON 0.05 MG/ML
0.25 INJECTION, SOLUTION INTRAVENOUS ONCE
Status: CANCELLED | OUTPATIENT
Start: 2019-04-15

## 2019-04-10 RX ORDER — SODIUM CHLORIDE 0.9 % (FLUSH) 0.9 %
10 SYRINGE (ML) INJECTION AS NEEDED
Status: CANCELLED
Start: 2019-04-17

## 2019-04-10 RX ORDER — DEXTROSE MONOHYDRATE 50 MG/ML
25 INJECTION, SOLUTION INTRAVENOUS CONTINUOUS
Status: CANCELLED
Start: 2019-04-15

## 2019-04-10 RX ORDER — FLUOROURACIL 50 MG/ML
400 INJECTION, SOLUTION INTRAVENOUS ONCE
Status: CANCELLED
Start: 2019-04-15 | End: 2019-04-15

## 2019-04-10 RX ORDER — ONDANSETRON 2 MG/ML
8 INJECTION INTRAMUSCULAR; INTRAVENOUS AS NEEDED
Status: CANCELLED | OUTPATIENT
Start: 2019-04-15

## 2019-04-10 RX ORDER — HEPARIN 100 UNIT/ML
300-500 SYRINGE INTRAVENOUS AS NEEDED
Status: CANCELLED
Start: 2019-04-15

## 2019-04-10 RX ORDER — SODIUM CHLORIDE 0.9 % (FLUSH) 0.9 %
10 SYRINGE (ML) INJECTION AS NEEDED
Status: CANCELLED
Start: 2019-04-15

## 2019-04-10 RX ORDER — ACETAMINOPHEN 325 MG/1
650 TABLET ORAL AS NEEDED
Status: CANCELLED
Start: 2019-04-15

## 2019-04-10 RX ORDER — SODIUM CHLORIDE 9 MG/ML
10 INJECTION INTRAMUSCULAR; INTRAVENOUS; SUBCUTANEOUS AS NEEDED
Status: CANCELLED | OUTPATIENT
Start: 2019-04-17

## 2019-04-10 RX ORDER — SODIUM CHLORIDE 9 MG/ML
10 INJECTION INTRAMUSCULAR; INTRAVENOUS; SUBCUTANEOUS AS NEEDED
Status: CANCELLED | OUTPATIENT
Start: 2019-04-15

## 2019-04-10 RX ORDER — FAMOTIDINE 20 MG/50ML
20 INJECTION, SOLUTION INTRAVENOUS AS NEEDED
Status: CANCELLED
Start: 2019-04-15

## 2019-04-10 RX ORDER — HEPARIN 100 UNIT/ML
300-500 SYRINGE INTRAVENOUS AS NEEDED
Status: CANCELLED
Start: 2019-04-17

## 2019-04-10 RX ORDER — DIPHENHYDRAMINE HYDROCHLORIDE 50 MG/ML
50 INJECTION, SOLUTION INTRAMUSCULAR; INTRAVENOUS AS NEEDED
Status: CANCELLED
Start: 2019-04-15

## 2019-04-10 RX ORDER — HYDROCORTISONE SODIUM SUCCINATE 100 MG/2ML
100 INJECTION, POWDER, FOR SOLUTION INTRAMUSCULAR; INTRAVENOUS AS NEEDED
Status: CANCELLED | OUTPATIENT
Start: 2019-04-15

## 2019-04-10 RX ORDER — ALBUTEROL SULFATE 0.83 MG/ML
2.5 SOLUTION RESPIRATORY (INHALATION) AS NEEDED
Status: CANCELLED
Start: 2019-04-15

## 2019-04-10 RX ORDER — EPINEPHRINE 1 MG/ML
0.3 INJECTION, SOLUTION, CONCENTRATE INTRAVENOUS AS NEEDED
Status: CANCELLED | OUTPATIENT
Start: 2019-04-15

## 2019-04-10 RX ORDER — ATROPINE SULFATE 0.4 MG/ML
0.4 INJECTION, SOLUTION ENDOTRACHEAL; INTRAMEDULLARY; INTRAMUSCULAR; INTRAVENOUS; SUBCUTANEOUS ONCE
Status: CANCELLED | OUTPATIENT
Start: 2019-04-15

## 2019-04-23 ENCOUNTER — TELEPHONE (OUTPATIENT)
Dept: ONCOLOGY | Age: 79
End: 2019-04-23

## 2019-04-23 NOTE — TELEPHONE ENCOUNTER
Patient called stated he has begun to have increased SOB today. Encouraged patient to call 911, he asked if he could drive himself, I replied he can drive himself, but I feel the best thing for him is to call 911 because if there is an emergency en route, the rescue squad is equipped to deal with this.

## 2019-04-24 RX ORDER — FAMOTIDINE 20 MG/50ML
20 INJECTION, SOLUTION INTRAVENOUS AS NEEDED
Status: CANCELLED
Start: 2019-04-29

## 2019-04-24 RX ORDER — ATROPINE SULFATE 0.4 MG/ML
0.4 INJECTION, SOLUTION ENDOTRACHEAL; INTRAMEDULLARY; INTRAMUSCULAR; INTRAVENOUS; SUBCUTANEOUS ONCE
Status: CANCELLED | OUTPATIENT
Start: 2019-04-29

## 2019-04-24 RX ORDER — FLUOROURACIL 50 MG/ML
400 INJECTION, SOLUTION INTRAVENOUS ONCE
Status: CANCELLED
Start: 2019-04-29 | End: 2019-04-29

## 2019-04-24 RX ORDER — ALBUTEROL SULFATE 0.83 MG/ML
2.5 SOLUTION RESPIRATORY (INHALATION) AS NEEDED
Status: CANCELLED
Start: 2019-04-29

## 2019-04-24 RX ORDER — SODIUM CHLORIDE 9 MG/ML
10 INJECTION INTRAMUSCULAR; INTRAVENOUS; SUBCUTANEOUS AS NEEDED
Status: CANCELLED | OUTPATIENT
Start: 2019-05-01

## 2019-04-24 RX ORDER — HEPARIN 100 UNIT/ML
300-500 SYRINGE INTRAVENOUS AS NEEDED
Status: CANCELLED
Start: 2019-05-01

## 2019-04-24 RX ORDER — DIPHENHYDRAMINE HYDROCHLORIDE 50 MG/ML
50 INJECTION, SOLUTION INTRAMUSCULAR; INTRAVENOUS AS NEEDED
Status: CANCELLED
Start: 2019-04-29

## 2019-04-24 RX ORDER — SODIUM CHLORIDE 9 MG/ML
10 INJECTION INTRAMUSCULAR; INTRAVENOUS; SUBCUTANEOUS AS NEEDED
Status: CANCELLED | OUTPATIENT
Start: 2019-04-29

## 2019-04-24 RX ORDER — EPINEPHRINE 1 MG/ML
0.3 INJECTION, SOLUTION, CONCENTRATE INTRAVENOUS AS NEEDED
Status: CANCELLED | OUTPATIENT
Start: 2019-04-29

## 2019-04-24 RX ORDER — HEPARIN 100 UNIT/ML
300-500 SYRINGE INTRAVENOUS AS NEEDED
Status: CANCELLED
Start: 2019-04-29

## 2019-04-24 RX ORDER — HYDROCORTISONE SODIUM SUCCINATE 100 MG/2ML
100 INJECTION, POWDER, FOR SOLUTION INTRAMUSCULAR; INTRAVENOUS AS NEEDED
Status: CANCELLED | OUTPATIENT
Start: 2019-04-29

## 2019-04-24 RX ORDER — ONDANSETRON 2 MG/ML
8 INJECTION INTRAMUSCULAR; INTRAVENOUS AS NEEDED
Status: CANCELLED | OUTPATIENT
Start: 2019-04-29

## 2019-04-24 RX ORDER — ACETAMINOPHEN 325 MG/1
650 TABLET ORAL AS NEEDED
Status: CANCELLED
Start: 2019-04-29

## 2019-04-24 RX ORDER — DEXTROSE MONOHYDRATE 50 MG/ML
25 INJECTION, SOLUTION INTRAVENOUS CONTINUOUS
Status: CANCELLED
Start: 2019-04-29

## 2019-04-24 RX ORDER — SODIUM CHLORIDE 0.9 % (FLUSH) 0.9 %
10 SYRINGE (ML) INJECTION AS NEEDED
Status: CANCELLED
Start: 2019-05-01

## 2019-04-24 RX ORDER — PALONOSETRON 0.05 MG/ML
0.25 INJECTION, SOLUTION INTRAVENOUS ONCE
Status: CANCELLED | OUTPATIENT
Start: 2019-04-29

## 2019-04-24 RX ORDER — SODIUM CHLORIDE 0.9 % (FLUSH) 0.9 %
10 SYRINGE (ML) INJECTION AS NEEDED
Status: CANCELLED
Start: 2019-04-29

## 2019-04-24 NOTE — TELEPHONE ENCOUNTER
Patient called, HIPAA verified. Patient was seen in ED yesterday, He would like Dr Lalo Toro to call him to review results.

## 2019-04-25 RX ORDER — SODIUM CHLORIDE 9 MG/ML
10 INJECTION INTRAMUSCULAR; INTRAVENOUS; SUBCUTANEOUS AS NEEDED
Status: CANCELLED | OUTPATIENT
Start: 2019-05-15

## 2019-04-25 RX ORDER — EPINEPHRINE 1 MG/ML
0.3 INJECTION, SOLUTION, CONCENTRATE INTRAVENOUS AS NEEDED
Status: CANCELLED | OUTPATIENT
Start: 2019-05-13

## 2019-04-25 RX ORDER — SODIUM CHLORIDE 0.9 % (FLUSH) 0.9 %
10 SYRINGE (ML) INJECTION AS NEEDED
Status: CANCELLED
Start: 2019-05-13

## 2019-04-25 RX ORDER — HYDROCORTISONE SODIUM SUCCINATE 100 MG/2ML
100 INJECTION, POWDER, FOR SOLUTION INTRAMUSCULAR; INTRAVENOUS AS NEEDED
Status: CANCELLED | OUTPATIENT
Start: 2019-05-13

## 2019-04-25 RX ORDER — PALONOSETRON 0.05 MG/ML
0.25 INJECTION, SOLUTION INTRAVENOUS ONCE
Status: CANCELLED | OUTPATIENT
Start: 2019-05-13

## 2019-04-25 RX ORDER — ONDANSETRON 2 MG/ML
8 INJECTION INTRAMUSCULAR; INTRAVENOUS AS NEEDED
Status: CANCELLED | OUTPATIENT
Start: 2019-05-13

## 2019-04-25 RX ORDER — FLUOROURACIL 50 MG/ML
400 INJECTION, SOLUTION INTRAVENOUS ONCE
Status: CANCELLED
Start: 2019-05-13 | End: 2019-05-13

## 2019-04-25 RX ORDER — HEPARIN 100 UNIT/ML
300-500 SYRINGE INTRAVENOUS AS NEEDED
Status: CANCELLED
Start: 2019-05-13

## 2019-04-25 RX ORDER — ACETAMINOPHEN 325 MG/1
650 TABLET ORAL AS NEEDED
Status: CANCELLED
Start: 2019-05-13

## 2019-04-25 RX ORDER — HEPARIN 100 UNIT/ML
300-500 SYRINGE INTRAVENOUS AS NEEDED
Status: CANCELLED
Start: 2019-05-15

## 2019-04-25 RX ORDER — DIPHENHYDRAMINE HYDROCHLORIDE 50 MG/ML
50 INJECTION, SOLUTION INTRAMUSCULAR; INTRAVENOUS AS NEEDED
Status: CANCELLED
Start: 2019-05-13

## 2019-04-25 RX ORDER — SODIUM CHLORIDE 0.9 % (FLUSH) 0.9 %
10 SYRINGE (ML) INJECTION AS NEEDED
Status: CANCELLED
Start: 2019-05-15

## 2019-04-25 RX ORDER — DEXTROSE MONOHYDRATE 50 MG/ML
25 INJECTION, SOLUTION INTRAVENOUS CONTINUOUS
Status: CANCELLED
Start: 2019-05-13

## 2019-04-25 RX ORDER — ATROPINE SULFATE 0.4 MG/ML
0.4 INJECTION, SOLUTION ENDOTRACHEAL; INTRAMEDULLARY; INTRAMUSCULAR; INTRAVENOUS; SUBCUTANEOUS ONCE
Status: CANCELLED | OUTPATIENT
Start: 2019-05-13

## 2019-04-25 RX ORDER — FAMOTIDINE 20 MG/50ML
20 INJECTION, SOLUTION INTRAVENOUS AS NEEDED
Status: CANCELLED
Start: 2019-05-13

## 2019-04-25 RX ORDER — ALBUTEROL SULFATE 0.83 MG/ML
2.5 SOLUTION RESPIRATORY (INHALATION) AS NEEDED
Status: CANCELLED
Start: 2019-05-13

## 2019-04-25 RX ORDER — SODIUM CHLORIDE 9 MG/ML
10 INJECTION INTRAMUSCULAR; INTRAVENOUS; SUBCUTANEOUS AS NEEDED
Status: CANCELLED | OUTPATIENT
Start: 2019-05-13

## 2019-04-26 ENCOUNTER — OFFICE VISIT (OUTPATIENT)
Dept: ONCOLOGY | Age: 79
End: 2019-04-26

## 2019-04-26 VITALS
OXYGEN SATURATION: 95 % | BODY MASS INDEX: 22.73 KG/M2 | DIASTOLIC BLOOD PRESSURE: 76 MMHG | RESPIRATION RATE: 18 BRPM | SYSTOLIC BLOOD PRESSURE: 118 MMHG | TEMPERATURE: 98 F | HEIGHT: 72 IN | WEIGHT: 167.8 LBS | HEART RATE: 71 BPM

## 2019-04-26 DIAGNOSIS — C25.0 MALIGNANT NEOPLASM OF HEAD OF PANCREAS (HCC): Primary | ICD-10-CM

## 2019-04-26 DIAGNOSIS — G89.3 NEOPLASM RELATED PAIN: ICD-10-CM

## 2019-04-26 NOTE — PROGRESS NOTES
Reason for Visit:   Yonathan Lugo is a 77 y.o. male who is seen for follow up of metastatic pancreatic cancer    Treatment History:   Dx: Metastatic Pancreatic Adenocarcinoma--Nov 2018--peritoneal mets  Tx: FOLFIRINOX--first cycle 2/4/19, second cycle 2/18/19, third cycle 3/4/19, fourth cycle on 3/18/19, due for 5th on 4/1/2019  Goal: prolong survival, Cycle length: until progression, Will return to see me prior to each cycle    History of Present Illness:   Doing very well, some fatigue and dyspnea that is unrelated to activity. Was seen in our ER and ruled out for pulmonary or cardiac etiology. No pain, some persistent loose stool.       Past Medical History:   Diagnosis Date    CAD (coronary artery disease)     3 cardiac stents   heart Dr. July Hankins Cervical spondylosis without myelopathy 2009    Chronic pain     shoulder left, upper lumbar    Depressive disorder, not elsewhere classified     Diaphragmatic hernia without mention of obstruction or gangrene     GERD (gastroesophageal reflux disease)     Hypercholesterolemia     Hypertension     Hypertrophy of prostate without urinary obstruction and other lower urinary tract symptoms (LUTS) 2008    Insomnia, unspecified 2009    Lumbosacral spondylosis without myelopathy 2009    Osteoarthrosis involving, or with mention of more than one site, but not specified as generalized, multiple sites 2010    Varicose veins 12/11/2014      Past Surgical History:   Procedure Laterality Date    CABG, ARTERY-VEIN, TWO  1/9/12    CABG    HX APPENDECTOMY  1962    HX CATARACT REMOVAL  2015    bilateral    HX COLONOSCOPY  2010    x2 small beign polyps    HX COLONOSCOPY  2017    hyperplastic polyp    HX ENDOSCOPY  2010    acid reflux    HX HEART CATHETERIZATION  1998, 2001    3 stents    HX HEENT  1962    fx nose repair    HX HERNIA REPAIR  early 1980's    left inguinal, with mesh    HX ORTHOPAEDIC  2017    lumbar fusion    HX OTHER SURGICAL  7 yrs. old    Hiatal Hernia    HX TONSILLECTOMY      HX VASCULAR ACCESS      IR INSERT TUNL CVAD W PORT LESS THAN 5 YR  2019      Social History     Tobacco Use    Smoking status: Former Smoker     Packs/day: 1.00     Years: 10.00     Pack years: 10.00     Types: Cigarettes     Start date: 1961     Last attempt to quit: 1971     Years since quittin.3    Smokeless tobacco: Never Used   Substance Use Topics    Alcohol use: No     Frequency: Never     Binge frequency: Never      Family History   Problem Relation Age of Onset    Hypertension Father     Heart Disease Father     Arthritis-osteo Mother     Cancer Brother 78        tumor in back    Heart Disease Brother     Diabetes Maternal Grandfather     Kidney Disease Maternal Grandfather      Current Outpatient Medications   Medication Sig    albuterol (VENTOLIN HFA) 90 mcg/actuation inhaler Take 2 Puffs by inhalation every four (4) hours as needed for Wheezing.  azithromycin (ZITHROMAX Z-AGNES) 250 mg tablet Take 2 tablets day 1, followed by 1 tablet per day for the next 4 days    LORazepam (ATIVAN) 0.5 mg tablet Take 1 Tab by mouth every four (4) hours as needed for Anxiety. Max Daily Amount: 3 mg. Indications: Nausea and Vomiting caused by Cancer Drugs    rivaroxaban (XARELTO) 20 mg tab tablet Take 1 Tab by mouth daily (with breakfast). Indications: lung embolism    ondansetron (ZOFRAN ODT) 4 mg disintegrating tablet Take 1 Tab by mouth every eight (8) hours as needed for Nausea.  traMADol (ULTRAM) 50 mg tablet TAKE 1 TABLET BY MOUTH EVERY 6 HOURS AS NEEDED FOR PAIN MAX DAILY AMOUNT 200MG    omeprazole (PRILOSEC) 20 mg capsule Take 20 mg by mouth daily.  dexamethasone (DECADRON) 4 mg tablet Take 8 mg by mouth daily.  polyethylene glycol (MIRALAX) 17 gram/dose powder Take 17 g by mouth daily.  amLODIPine (NORVASC) 5 mg tablet Take 5 mg by mouth daily.     DULoxetine (CYMBALTA) 30 mg capsule TAKE ONE CAPSULE BY MOUTH EVERY DAY    tamsulosin (FLOMAX) 0.4 mg capsule TAKE 2 CAPSULES BY MOUTH NIGHLTY    rosuvastatin (CRESTOR) 10 mg tablet Take 10 mg by mouth nightly.  cyclobenzaprine (FLEXERIL) 10 mg tablet TAKE 1 TABLET BY MOUTH THREE (3) TIMES DAILY AS NEEDED FOR MUSCLE SPASM(S). (Patient taking differently: TAKE 1 TABLET BY MOUTH daily AS NEEDED FOR MUSCLE SPASM(S).)    METOPROLOL TARTRATE PO Take 12.5 mg by mouth two (2) times a day.  multivitamin (ONE A DAY) tablet Take 1 Tab by mouth daily.  aspirin 81 mg tablet Take 81 mg by mouth.  dutasteride (AVODART) 0.5 mg capsule Take 0.5 mg by mouth daily. No current facility-administered medications for this visit. No Known Allergies     Review of Systems: A complete review of systems was obtained, negative except as described above. Physical Exam:   There were no vitals taken for this visit. ECOG PS: 1  General: No distress  Eyes: PERRLA, anicteric sclerae  HENT: Atraumatic, OP clear  Neck: Supple  Lymphatic: No cervical, supraclavicular, or inguinal adenopathy  Respiratory: CTAB, normal respiratory effort  CV: Normal rate, regular rhythm, no murmurs, no peripheral edema  GI: Soft, nontender, nondistended, no masses, no hepatomegaly, no splenomegaly  MS: Normal gait and station. Digits without clubbing or cyanosis. Skin: No rashes, ecchymoses, or petechiae. Normal temperature, turgor, and texture. Psych: Alert, oriented, appropriate affect, normal judgment/insight    Results:     Lab Results   Component Value Date/Time    WBC 10.0 04/23/2019 05:25 PM    HGB 11.1 (L) 04/23/2019 05:25 PM    HCT 33.2 (L) 04/23/2019 05:25 PM    PLATELET 368 (L) 08/08/4972 05:25 PM    MCV 92.5 04/23/2019 05:25 PM    ABS.  NEUTROPHILS 7.8 04/23/2019 05:25 PM     Lab Results   Component Value Date/Time    Sodium 140 04/23/2019 05:25 PM    Potassium 4.3 04/23/2019 05:25 PM    Chloride 104 04/23/2019 05:25 PM    CO2 22 04/23/2019 05:25 PM    Glucose 109 (H) 04/23/2019 05:25 PM    BUN 16 04/23/2019 05:25 PM    Creatinine 1.14 04/23/2019 05:25 PM    GFR est AA >60 04/23/2019 05:25 PM    GFR est non-AA >60 04/23/2019 05:25 PM    Calcium 8.6 04/23/2019 05:25 PM    Glucose (POC) 112 (H) 01/12/2012 06:32 AM     Lab Results   Component Value Date/Time    Bilirubin, total 0.4 04/23/2019 05:25 PM    ALT (SGPT) 25 04/23/2019 05:25 PM    AST (SGOT) 20 04/23/2019 05:25 PM    Alk. phosphatase 139 (H) 04/23/2019 05:25 PM    Protein, total 6.0 (L) 04/23/2019 05:25 PM    Albumin 3.1 (L) 04/23/2019 05:25 PM    Globulin 2.9 04/23/2019 05:25 PM     CT C/A/P 4/5/2019  Resolved pulmonary emboli possible persistent right femoral vein DVT. Positive carcinomatosis with interval decrease in amount of ascites. Incidental cholelithiasis, lower lumbar fusion and hepatic cysts    Assessment:   1) Metastatic Pancreatic Carcinoma with Peritoneal Metastasis  2) Neoplasm Pain  3) PE    Plan:   1) Continue with FOLFIRINOX-Repeat Imaging shows its decreasing in size so its correlating with his CA 19-9 which continues to drop.  Will contact us if things change.  Continue neulasta day 3 each cycle.  Will plan on seeing him in 2 months. Will take a two week break starting memorial day  2) Pain is well controlled currently--not requiring tramadol at this time.      3) Continue Xarelto 20mg daily        Signed By: Samir Munoz MD

## 2019-04-26 NOTE — PROGRESS NOTES
Tracie Peoples is a 77 y.o. male here for f/u pancreatic cancer. His last PET was 1/22/19. He has chronic hip pain in L Hip. C/o dizziness when standing at times, he is pausing, educated on monitoring BP and pausing before walking. He will get with his PCP to get advice on BP medications. Coughing up mucus in morning, sometimes brown, he has occasonial nose bleeds. Chemotherapy Flowsheet 4/17/2019   Cycle C6D3   Date 4/17/2019   Drug / Regimen D/C   Dosage -   Time Up -   Time Down -   Pre Meds -   Notes CIV 5FU Pump       Key Oncology Meds             ondansetron (ZOFRAN ODT) 4 mg disintegrating tablet Take 1 Tab by mouth every eight (8) hours as needed for Nausea.

## 2019-04-26 NOTE — PATIENT INSTRUCTIONS
Dehydration: Care Instructions  Your Care Instructions  Dehydration happens when your body loses too much fluid. This might happen when you do not drink enough water or you lose large amounts of fluids from your body because of diarrhea, vomiting, or sweating. Severe dehydration can be life-threatening. Water and minerals called electrolytes help put your body fluids back in balance. Learn the early signs of fluid loss, and drink more fluids to prevent dehydration. Follow-up care is a key part of your treatment and safety. Be sure to make and go to all appointments, and call your doctor if you are having problems. It's also a good idea to know your test results and keep a list of the medicines you take. How can you care for yourself at home? · To prevent dehydration, drink plenty of fluids, enough so that your urine is light yellow or clear like water. Choose water and other caffeine-free clear liquids until you feel better. If you have kidney, heart, or liver disease and have to limit fluids, talk with your doctor before you increase the amount of fluids you drink. · If you do not feel like eating or drinking, try taking small sips of water, sports drinks, or other rehydration drinks. · Get plenty of rest.  To prevent dehydration  · Add more fluids to your diet and daily routine, unless your doctor has told you not to. · During hot weather, drink more fluids. Drink even more fluids if you exercise a lot. Stay away from drinks with alcohol or caffeine. · Watch for the symptoms of dehydration. These include:  ? A dry, sticky mouth. ? Dark yellow urine, and not much of it. ? Dry and sunken eyes. ? Feeling very tired. · Learn what problems can lead to dehydration. These include:  ? Diarrhea, fever, and vomiting. ? Any illness with a fever, such as pneumonia or the flu. ? Activities that cause heavy sweating, such as endurance races and heavy outdoor work in hot or humid weather. ?  Alcohol or drug abuse or withdrawal.  ? Certain medicines, such as cold and allergy pills (antihistamines), diet pills (diuretics), and laxatives. ? Certain diseases, such as diabetes, cancer, and heart or kidney disease. When should you call for help? Call 911 anytime you think you may need emergency care. For example, call if:    · You passed out (lost consciousness).    Call your doctor now or seek immediate medical care if:    · You are confused and cannot think clearly.     · You are dizzy or lightheaded, or you feel like you may faint.     · You have signs of needing more fluids. You have sunken eyes and a dry mouth, and you pass only a little dark urine.     · You cannot keep fluids down.    Watch closely for changes in your health, and be sure to contact your doctor if:    · You are not making tears.     · Your skin is very dry and sags slowly back into place after you pinch it.     · Your mouth and eyes are very dry. Where can you learn more? Go to http://zamzam-kun.info/. Enter K813 in the search box to learn more about \"Dehydration: Care Instructions. \"  Current as of: September 23, 2018  Content Version: 11.9  © 2447-4030 [employer]. Care instructions adapted under license by [employer] (which disclaims liability or warranty for this information). If you have questions about a medical condition or this instruction, always ask your healthcare professional. [place] [address] any warranty or liability for your use of this information. Learning About Postural Orthostatic Tachycardia Syndrome (POTS)  What is POTS? Postural orthostatic tachycardia syndrome (POTS) is a fast heart rate (tachycardia) that starts after you stand up. This can suddenly happen as long as 10 minutes after you stand. What happens when you have POTS? With POTS, the body does not control blood pressure or heart rate as it should after you stand up.  So for a brief time, you may not get enough blood to your brain. People with severe fatigue and dizziness may find it hard to keep up with daily living. But treatment can help. What are the symptoms? POTS can make you feel dizzy and lightheaded. You may faint. You may also feel tired. Blurred vision and feeling anxious are also symptoms. And you may have trouble with keeping your attention focused. Symptoms can range from mild to severe. Some things can make symptoms worse. These include heat, eating, exercise, showering, sitting too long, and menstrual cycle changes. When you first notice symptoms, sitting or lying down may help you feel better. What causes it? POTS may follow a viral illness, a surgery, pregnancy, bed rest, or a severe trauma. Experts don't understand what causes it, but different body systems seem to be out of balance. How is POTS diagnosed? To learn what is causing your symptoms, your doctor may:  · Ask about your symptoms, including when and how they started. · Check how your blood pressure and heart rate change when you move from lying down to sitting to standing. · Do a tilt table test. The test uses a special table that slowly tilts you to an upright position. It checks how your body responds when you change positions. How is POTS treated? Work with your doctor to find the right mix of treatments. These treatments may include:  · Taking medicine prescribed by your doctor. For some people, taking medicine that's normally used for high blood pressure can help. Taking medicine that keeps the body's fluids balanced may also help. · Everyday self-care. These practices can be a waller part of helping the body get back in balance. ¨ Drink plenty of fluids. For many people, low body fluid is part of what makes POTS symptoms worse. ¨ Eat the amount of salt your doctor tells you to. Salt helps keep up the body's fluid level. ¨ Try a special exercise program. Your doctor may give you a program of specific exercises. You start short and slow, especially if fatigue is a problem. Add a little at a time. At first, you only do exercise when you're reclined. After a few weeks, you start to add upright exercise. ¨ Keep track of your symptoms and what makes them better and worse. When should you call for help? Call 911 anytime you think you may need emergency care. For example, call if:  · You passed out (lost consciousness), and it feels different than your typical episode or you don't recover as quickly as you have in the past.  Call your doctor now or seek immediate medical care if:  · Your symptoms are getting worse. For example, you are more dizzy or lightheaded. Watch closely for changes in your health, and be sure to contact your doctor if:  · You do not get better as expected. Follow-up care is a key part of your treatment and safety. Be sure to make and go to all appointments, and call your doctor if you are having problems. It's also a good idea to know your test results and keep a list of the medicines you take. Where can you learn more? Go to [employer]  Enter S345 in the search box to learn more about \"Learning About Postural Orthostatic Tachycardia Syndrome (POTS). \"   © 2734-4508 [employer]. Care instructions adapted under license by [employer] (which disclaims liability or warranty for this information). This care instruction is for use with your licensed healthcare professional. If you have questions about a medical condition or this instruction, always ask your healthcare professional. [place] [address] any warranty or liability for your use of this information.   Content Version: 29.0.716069; Current as of: February 20, 2015

## 2019-05-03 ENCOUNTER — TELEPHONE (OUTPATIENT)
Dept: ONCOLOGY | Age: 79
End: 2019-05-03

## 2019-05-03 NOTE — TELEPHONE ENCOUNTER
Patient called stating he is feeling light headed, he took his BP it is 108/8-(something). We had spoken during last visit about orthostatic hypotension, he is trying to increase his fluid in take. He is going to call his cardiologist to ask about BP medication he is taking . Patient states he is eating, reviewed meals from today, he is taking in good carbs. Spoke with Dr Ken Taylor if patient can come into office we can give a liter of fluid to help. Patient is waiting to hear back from cardiologist, then will call us back.

## 2019-05-29 NOTE — PERIOP NOTES
1. Health mainteance examination ( ) & Benign essential hypertension & Hyperlipidemia & CKD , stage 3 & Enlarged prostate w/o LUTS & Cerumen impaction, bilateral & Need for Tdap & Obesity & GERD: Condition stable. Asymptomatic. States that he is experiencing some problems hearing out of the left ear due to ear wax. The was cerumen present in both ears on exam  - was completed  -Pt received the Tdap vaccine in the office today  -Mini-cog: completed, results negatice  -Depression screen completed, results negative . Pt denied being depressed  -Hypertension/ CKD, stage 3 & Obesity: Stable. Asymptomatic. Pt to continue b/p medication as directed ( Losartan). Will continue to monitor kidney status   -Hyperlipidemia: Pt will continue cholesterol medication as directed ( Fenofibrate)   -GERD: Stable. Asymptomatic. Pt will continue the ( Pantoprazole) as need for symptom management  -Enlarge prostate w/o LUTS: Will continue to monitor the PSA / pt will continue the (Tamsulosin) as directed  -pt was given a referral to the [facility] [place] for an annual eye exam  -Obesity: discussed the importance of regular exercise, weight reduction and changing dietary habits  -pt will make an appointment with the nurse at the  for an ear wash,bilateral. He was given a copy of the instructions to prepare for the ear wash  -pt can perform all ADLs w/o assist  -instructed to keep his f/u appointment w/pc ( Dr. Smith) as scheduled       Pt more alert, moving all extremities except LUE. Turned on side but arm positioned for safety. 1115 Pt. Alert. Denies pain or chill. Discharge instructions reviewed with caregiver and patient. Allowed and answered questions. Tolerating PO fluids. Both state ready for discharge. 1135 Pt discharged to car with sling. Showed family about sling.  Picking up RXs from pharmacy

## 2019-06-03 RX ORDER — ALBUTEROL SULFATE 0.83 MG/ML
2.5 SOLUTION RESPIRATORY (INHALATION) AS NEEDED
Status: CANCELLED
Start: 2019-06-05

## 2019-06-03 RX ORDER — SODIUM CHLORIDE 9 MG/ML
10 INJECTION INTRAMUSCULAR; INTRAVENOUS; SUBCUTANEOUS AS NEEDED
Status: CANCELLED | OUTPATIENT
Start: 2019-06-07

## 2019-06-03 RX ORDER — FAMOTIDINE 20 MG/50ML
20 INJECTION, SOLUTION INTRAVENOUS AS NEEDED
Status: CANCELLED
Start: 2019-06-05

## 2019-06-03 RX ORDER — EPINEPHRINE 1 MG/ML
0.3 INJECTION, SOLUTION, CONCENTRATE INTRAVENOUS AS NEEDED
Status: CANCELLED | OUTPATIENT
Start: 2019-06-05

## 2019-06-03 RX ORDER — SODIUM CHLORIDE 0.9 % (FLUSH) 0.9 %
10 SYRINGE (ML) INJECTION AS NEEDED
Status: CANCELLED
Start: 2019-06-07

## 2019-06-03 RX ORDER — SODIUM CHLORIDE 9 MG/ML
10 INJECTION INTRAMUSCULAR; INTRAVENOUS; SUBCUTANEOUS AS NEEDED
Status: CANCELLED | OUTPATIENT
Start: 2019-06-05

## 2019-06-03 RX ORDER — HYDROCORTISONE SODIUM SUCCINATE 100 MG/2ML
100 INJECTION, POWDER, FOR SOLUTION INTRAMUSCULAR; INTRAVENOUS AS NEEDED
Status: CANCELLED | OUTPATIENT
Start: 2019-06-05

## 2019-06-03 RX ORDER — FLUOROURACIL 50 MG/ML
300 INJECTION, SOLUTION INTRAVENOUS ONCE
Status: CANCELLED
Start: 2019-06-05 | End: 2019-06-05

## 2019-06-03 RX ORDER — SODIUM CHLORIDE 0.9 % (FLUSH) 0.9 %
10 SYRINGE (ML) INJECTION AS NEEDED
Status: CANCELLED
Start: 2019-06-05

## 2019-06-03 RX ORDER — ONDANSETRON 2 MG/ML
8 INJECTION INTRAMUSCULAR; INTRAVENOUS AS NEEDED
Status: CANCELLED | OUTPATIENT
Start: 2019-06-05

## 2019-06-03 RX ORDER — ATROPINE SULFATE 0.4 MG/ML
0.4 INJECTION, SOLUTION ENDOTRACHEAL; INTRAMEDULLARY; INTRAMUSCULAR; INTRAVENOUS; SUBCUTANEOUS ONCE
Status: CANCELLED | OUTPATIENT
Start: 2019-06-05

## 2019-06-03 RX ORDER — PALONOSETRON 0.05 MG/ML
0.25 INJECTION, SOLUTION INTRAVENOUS ONCE
Status: CANCELLED | OUTPATIENT
Start: 2019-06-05

## 2019-06-03 RX ORDER — HEPARIN 100 UNIT/ML
300-500 SYRINGE INTRAVENOUS AS NEEDED
Status: CANCELLED
Start: 2019-06-07

## 2019-06-03 RX ORDER — DIPHENHYDRAMINE HYDROCHLORIDE 50 MG/ML
50 INJECTION, SOLUTION INTRAMUSCULAR; INTRAVENOUS AS NEEDED
Status: CANCELLED
Start: 2019-06-05

## 2019-06-03 RX ORDER — HEPARIN 100 UNIT/ML
300-500 SYRINGE INTRAVENOUS AS NEEDED
Status: CANCELLED
Start: 2019-06-05

## 2019-06-03 RX ORDER — DEXTROSE MONOHYDRATE 50 MG/ML
25 INJECTION, SOLUTION INTRAVENOUS CONTINUOUS
Status: CANCELLED
Start: 2019-06-05

## 2019-06-03 RX ORDER — ACETAMINOPHEN 325 MG/1
650 TABLET ORAL AS NEEDED
Status: CANCELLED
Start: 2019-06-05

## 2019-06-05 RX ORDER — HEPARIN 100 UNIT/ML
300-500 SYRINGE INTRAVENOUS AS NEEDED
Status: CANCELLED
Start: 2019-06-05

## 2019-06-05 RX ORDER — ONDANSETRON 2 MG/ML
8 INJECTION INTRAMUSCULAR; INTRAVENOUS AS NEEDED
Status: CANCELLED | OUTPATIENT
Start: 2019-06-05

## 2019-06-05 RX ORDER — SODIUM CHLORIDE 0.9 % (FLUSH) 0.9 %
10 SYRINGE (ML) INJECTION AS NEEDED
Status: CANCELLED
Start: 2019-06-07

## 2019-06-05 RX ORDER — SODIUM CHLORIDE 9 MG/ML
10 INJECTION INTRAMUSCULAR; INTRAVENOUS; SUBCUTANEOUS AS NEEDED
Status: CANCELLED | OUTPATIENT
Start: 2019-06-07

## 2019-06-05 RX ORDER — DIPHENHYDRAMINE HYDROCHLORIDE 50 MG/ML
50 INJECTION, SOLUTION INTRAMUSCULAR; INTRAVENOUS AS NEEDED
Status: CANCELLED
Start: 2019-06-05

## 2019-06-05 RX ORDER — DEXTROSE MONOHYDRATE 50 MG/ML
25 INJECTION, SOLUTION INTRAVENOUS CONTINUOUS
Status: CANCELLED
Start: 2019-06-05

## 2019-06-05 RX ORDER — SODIUM CHLORIDE 9 MG/ML
10 INJECTION INTRAMUSCULAR; INTRAVENOUS; SUBCUTANEOUS AS NEEDED
Status: CANCELLED | OUTPATIENT
Start: 2019-06-05

## 2019-06-05 RX ORDER — FLUOROURACIL 50 MG/ML
300 INJECTION, SOLUTION INTRAVENOUS ONCE
Status: CANCELLED
Start: 2019-06-05

## 2019-06-05 RX ORDER — ATROPINE SULFATE 0.4 MG/ML
0.4 INJECTION, SOLUTION ENDOTRACHEAL; INTRAMEDULLARY; INTRAMUSCULAR; INTRAVENOUS; SUBCUTANEOUS
Status: CANCELLED | OUTPATIENT
Start: 2019-06-05

## 2019-06-05 RX ORDER — ALBUTEROL SULFATE 0.83 MG/ML
2.5 SOLUTION RESPIRATORY (INHALATION) AS NEEDED
Status: CANCELLED
Start: 2019-06-05

## 2019-06-05 RX ORDER — ONDANSETRON 2 MG/ML
8 INJECTION INTRAMUSCULAR; INTRAVENOUS ONCE
Status: CANCELLED | OUTPATIENT
Start: 2019-06-05

## 2019-06-05 RX ORDER — SODIUM CHLORIDE 0.9 % (FLUSH) 0.9 %
10 SYRINGE (ML) INJECTION AS NEEDED
Status: CANCELLED
Start: 2019-06-05

## 2019-06-05 RX ORDER — ATROPINE SULFATE 0.4 MG/ML
0.4 INJECTION, SOLUTION ENDOTRACHEAL; INTRAMEDULLARY; INTRAMUSCULAR; INTRAVENOUS; SUBCUTANEOUS ONCE
Status: CANCELLED | OUTPATIENT
Start: 2019-06-05

## 2019-06-05 RX ORDER — EPINEPHRINE 1 MG/ML
0.3 INJECTION, SOLUTION, CONCENTRATE INTRAVENOUS AS NEEDED
Status: CANCELLED | OUTPATIENT
Start: 2019-06-05

## 2019-06-05 RX ORDER — HYDROCORTISONE SODIUM SUCCINATE 100 MG/2ML
100 INJECTION, POWDER, FOR SOLUTION INTRAMUSCULAR; INTRAVENOUS AS NEEDED
Status: CANCELLED | OUTPATIENT
Start: 2019-06-05

## 2019-06-05 RX ORDER — OXYCODONE AND ACETAMINOPHEN 5; 325 MG/1; MG/1
2 TABLET ORAL
Qty: 90 TAB | Refills: 0 | Status: SHIPPED | OUTPATIENT
Start: 2019-06-05 | End: 2019-06-19

## 2019-06-05 RX ORDER — ACETAMINOPHEN 325 MG/1
650 TABLET ORAL AS NEEDED
Status: CANCELLED
Start: 2019-06-05

## 2019-06-05 RX ORDER — HEPARIN 100 UNIT/ML
300-500 SYRINGE INTRAVENOUS AS NEEDED
Status: CANCELLED
Start: 2019-06-07

## 2019-06-07 RX ORDER — ATROPINE SULFATE 0.4 MG/ML
0.4 INJECTION, SOLUTION ENDOTRACHEAL; INTRAMEDULLARY; INTRAMUSCULAR; INTRAVENOUS; SUBCUTANEOUS
Status: CANCELLED | OUTPATIENT
Start: 2019-06-24

## 2019-06-07 RX ORDER — ALBUTEROL SULFATE 0.83 MG/ML
2.5 SOLUTION RESPIRATORY (INHALATION) AS NEEDED
Status: CANCELLED
Start: 2019-07-08

## 2019-06-07 RX ORDER — ALBUTEROL SULFATE 0.83 MG/ML
2.5 SOLUTION RESPIRATORY (INHALATION) AS NEEDED
Status: CANCELLED
Start: 2019-06-24

## 2019-06-07 RX ORDER — FLUOROURACIL 50 MG/ML
300 INJECTION, SOLUTION INTRAVENOUS ONCE
Status: CANCELLED
Start: 2019-07-08 | End: 2019-07-08

## 2019-06-07 RX ORDER — SODIUM CHLORIDE 0.9 % (FLUSH) 0.9 %
10 SYRINGE (ML) INJECTION AS NEEDED
Status: CANCELLED
Start: 2019-06-26

## 2019-06-07 RX ORDER — ATROPINE SULFATE 0.4 MG/ML
0.4 INJECTION, SOLUTION ENDOTRACHEAL; INTRAMEDULLARY; INTRAMUSCULAR; INTRAVENOUS; SUBCUTANEOUS ONCE
Status: CANCELLED | OUTPATIENT
Start: 2019-06-24

## 2019-06-07 RX ORDER — ONDANSETRON 2 MG/ML
8 INJECTION INTRAMUSCULAR; INTRAVENOUS ONCE
Status: CANCELLED | OUTPATIENT
Start: 2019-06-24

## 2019-06-07 RX ORDER — DIPHENHYDRAMINE HYDROCHLORIDE 50 MG/ML
50 INJECTION, SOLUTION INTRAMUSCULAR; INTRAVENOUS AS NEEDED
Status: CANCELLED
Start: 2019-07-08

## 2019-06-07 RX ORDER — SODIUM CHLORIDE 9 MG/ML
10 INJECTION INTRAMUSCULAR; INTRAVENOUS; SUBCUTANEOUS AS NEEDED
Status: CANCELLED | OUTPATIENT
Start: 2019-07-10

## 2019-06-07 RX ORDER — HEPARIN 100 UNIT/ML
300-500 SYRINGE INTRAVENOUS AS NEEDED
Status: CANCELLED
Start: 2019-06-26

## 2019-06-07 RX ORDER — HYDROCORTISONE SODIUM SUCCINATE 100 MG/2ML
100 INJECTION, POWDER, FOR SOLUTION INTRAMUSCULAR; INTRAVENOUS AS NEEDED
Status: CANCELLED | OUTPATIENT
Start: 2019-06-24

## 2019-06-07 RX ORDER — SODIUM CHLORIDE 9 MG/ML
10 INJECTION INTRAMUSCULAR; INTRAVENOUS; SUBCUTANEOUS AS NEEDED
Status: CANCELLED | OUTPATIENT
Start: 2019-06-24

## 2019-06-07 RX ORDER — EPINEPHRINE 1 MG/ML
0.3 INJECTION, SOLUTION, CONCENTRATE INTRAVENOUS AS NEEDED
Status: CANCELLED | OUTPATIENT
Start: 2019-06-24

## 2019-06-07 RX ORDER — EPINEPHRINE 1 MG/ML
0.3 INJECTION, SOLUTION, CONCENTRATE INTRAVENOUS AS NEEDED
Status: CANCELLED | OUTPATIENT
Start: 2019-07-08

## 2019-06-07 RX ORDER — DEXTROSE MONOHYDRATE 50 MG/ML
25 INJECTION, SOLUTION INTRAVENOUS CONTINUOUS
Status: CANCELLED
Start: 2019-07-08

## 2019-06-07 RX ORDER — ATROPINE SULFATE 0.4 MG/ML
0.4 INJECTION, SOLUTION ENDOTRACHEAL; INTRAMEDULLARY; INTRAMUSCULAR; INTRAVENOUS; SUBCUTANEOUS ONCE
Status: CANCELLED | OUTPATIENT
Start: 2019-07-08

## 2019-06-07 RX ORDER — ACETAMINOPHEN 325 MG/1
650 TABLET ORAL AS NEEDED
Status: CANCELLED
Start: 2019-06-24

## 2019-06-07 RX ORDER — HEPARIN 100 UNIT/ML
300-500 SYRINGE INTRAVENOUS AS NEEDED
Status: CANCELLED
Start: 2019-06-24

## 2019-06-07 RX ORDER — ONDANSETRON 2 MG/ML
8 INJECTION INTRAMUSCULAR; INTRAVENOUS ONCE
Status: CANCELLED | OUTPATIENT
Start: 2019-07-08

## 2019-06-07 RX ORDER — SODIUM CHLORIDE 0.9 % (FLUSH) 0.9 %
10 SYRINGE (ML) INJECTION AS NEEDED
Status: CANCELLED
Start: 2019-06-24

## 2019-06-07 RX ORDER — HEPARIN 100 UNIT/ML
300-500 SYRINGE INTRAVENOUS AS NEEDED
Status: CANCELLED
Start: 2019-07-08

## 2019-06-07 RX ORDER — DIPHENHYDRAMINE HYDROCHLORIDE 50 MG/ML
50 INJECTION, SOLUTION INTRAMUSCULAR; INTRAVENOUS AS NEEDED
Status: CANCELLED
Start: 2019-06-24

## 2019-06-07 RX ORDER — SODIUM CHLORIDE 9 MG/ML
10 INJECTION INTRAMUSCULAR; INTRAVENOUS; SUBCUTANEOUS AS NEEDED
Status: CANCELLED | OUTPATIENT
Start: 2019-07-08

## 2019-06-07 RX ORDER — DEXTROSE MONOHYDRATE 50 MG/ML
25 INJECTION, SOLUTION INTRAVENOUS CONTINUOUS
Status: CANCELLED
Start: 2019-06-24

## 2019-06-07 RX ORDER — HYDROCORTISONE SODIUM SUCCINATE 100 MG/2ML
100 INJECTION, POWDER, FOR SOLUTION INTRAMUSCULAR; INTRAVENOUS AS NEEDED
Status: CANCELLED | OUTPATIENT
Start: 2019-07-08

## 2019-06-07 RX ORDER — ATROPINE SULFATE 0.4 MG/ML
0.4 INJECTION, SOLUTION ENDOTRACHEAL; INTRAMEDULLARY; INTRAMUSCULAR; INTRAVENOUS; SUBCUTANEOUS
Status: CANCELLED | OUTPATIENT
Start: 2019-07-08

## 2019-06-07 RX ORDER — SODIUM CHLORIDE 0.9 % (FLUSH) 0.9 %
10 SYRINGE (ML) INJECTION AS NEEDED
Status: CANCELLED
Start: 2019-07-08

## 2019-06-07 RX ORDER — SODIUM CHLORIDE 9 MG/ML
10 INJECTION INTRAMUSCULAR; INTRAVENOUS; SUBCUTANEOUS AS NEEDED
Status: CANCELLED | OUTPATIENT
Start: 2019-06-26

## 2019-06-07 RX ORDER — ACETAMINOPHEN 325 MG/1
650 TABLET ORAL AS NEEDED
Status: CANCELLED
Start: 2019-07-08

## 2019-06-07 RX ORDER — ONDANSETRON 2 MG/ML
8 INJECTION INTRAMUSCULAR; INTRAVENOUS AS NEEDED
Status: CANCELLED | OUTPATIENT
Start: 2019-06-24

## 2019-06-07 RX ORDER — SODIUM CHLORIDE 0.9 % (FLUSH) 0.9 %
10 SYRINGE (ML) INJECTION AS NEEDED
Status: CANCELLED
Start: 2019-07-10

## 2019-06-07 RX ORDER — HEPARIN 100 UNIT/ML
300-500 SYRINGE INTRAVENOUS AS NEEDED
Status: CANCELLED
Start: 2019-07-10

## 2019-06-07 RX ORDER — FLUOROURACIL 50 MG/ML
300 INJECTION, SOLUTION INTRAVENOUS ONCE
Status: CANCELLED
Start: 2019-06-24 | End: 2019-06-24

## 2019-06-07 RX ORDER — ONDANSETRON 2 MG/ML
8 INJECTION INTRAMUSCULAR; INTRAVENOUS AS NEEDED
Status: CANCELLED | OUTPATIENT
Start: 2019-07-08

## 2019-06-24 DIAGNOSIS — G89.3 NEOPLASM RELATED PAIN: Primary | ICD-10-CM

## 2019-06-26 ENCOUNTER — OFFICE VISIT (OUTPATIENT)
Dept: ONCOLOGY | Age: 79
End: 2019-06-26

## 2019-06-26 VITALS
BODY MASS INDEX: 23.62 KG/M2 | OXYGEN SATURATION: 96 % | HEART RATE: 67 BPM | DIASTOLIC BLOOD PRESSURE: 77 MMHG | RESPIRATION RATE: 19 BRPM | SYSTOLIC BLOOD PRESSURE: 149 MMHG | TEMPERATURE: 97.9 F | WEIGHT: 174.4 LBS | HEIGHT: 72 IN

## 2019-06-26 DIAGNOSIS — G89.3 NEOPLASM RELATED PAIN: Primary | ICD-10-CM

## 2019-06-26 DIAGNOSIS — T45.1X5A CHEMOTHERAPY-INDUCED NAUSEA: ICD-10-CM

## 2019-06-26 DIAGNOSIS — C25.0 MALIGNANT NEOPLASM OF HEAD OF PANCREAS (HCC): ICD-10-CM

## 2019-06-26 DIAGNOSIS — R11.0 CHEMOTHERAPY-INDUCED NAUSEA: ICD-10-CM

## 2019-06-26 RX ORDER — OXYCODONE AND ACETAMINOPHEN 5; 325 MG/1; MG/1
2 TABLET ORAL
Qty: 90 TAB | Refills: 0 | Status: SHIPPED | OUTPATIENT
Start: 2019-06-26 | End: 2019-07-10

## 2019-06-26 RX ORDER — ONDANSETRON 4 MG/1
4 TABLET, ORALLY DISINTEGRATING ORAL
Qty: 30 TAB | Refills: 2 | Status: SHIPPED | OUTPATIENT
Start: 2019-06-26 | End: 2019-11-08 | Stop reason: SDUPTHER

## 2019-06-26 NOTE — PROGRESS NOTES
Reason for Visit:   Trish Angeles is a 77 y.o. male who is seen for follow up of metastatic pancreatic cancer    Treatment History:   Dx: Metastatic Pancreatic Adenocarcinoma--Nov 2018--peritoneal mets  Tx: FOLFIRINOX--first cycle 2/4/19, second cycle 2/18/19, third cycle 3/4/19, fourth cycle on 3/18/19, 5th on 4/1/19, 6th 4/15/2019, 7th 4/29/19, 8th 5/13/19, 9th 6/5/19, 10th 6/24/19  Goal: prolong survival, Cycle length: until progression, Will return to see me prior to each cycle    History of Present Illness:   CA 19-9 continues to drop--scans looked better but did see a new area of loculated ascites--pancreas mass was smaller, no new masses. Still having pain in his hip--seeing his orthopedic surgeon. Dex helping.     Past Medical History:   Diagnosis Date    CAD (coronary artery disease)     3 cardiac stents   heart Dr. Erika Campbell Cervical spondylosis without myelopathy 2009    Chronic pain     shoulder left, upper lumbar    Depressive disorder, not elsewhere classified     Diaphragmatic hernia without mention of obstruction or gangrene     GERD (gastroesophageal reflux disease)     Hypercholesterolemia     Hypertension     Hypertrophy of prostate without urinary obstruction and other lower urinary tract symptoms (LUTS) 2008    Insomnia, unspecified 2009    Lumbosacral spondylosis without myelopathy 2009    Osteoarthrosis involving, or with mention of more than one site, but not specified as generalized, multiple sites 2010    Varicose veins 12/11/2014      Past Surgical History:   Procedure Laterality Date    CABG, ARTERY-VEIN, TWO  1/9/12    CABG    HX APPENDECTOMY  1962    HX CATARACT REMOVAL  2015    bilateral    HX COLONOSCOPY  2010    x2 small beign polyps    HX COLONOSCOPY  2017    hyperplastic polyp    HX ENDOSCOPY  2010    acid reflux    HX HEART CATHETERIZATION  1998, 2001    3 stents    HX HEENT  1962    fx nose repair    HX HERNIA REPAIR  early 1980's left inguinal, with mesh    HX ORTHOPAEDIC  2017    lumbar fusion    HX OTHER SURGICAL  7 yrs. old    Hiatal Hernia    HX TONSILLECTOMY      HX VASCULAR ACCESS      IR INSERT TUNL CVAD W PORT LESS THAN 5 YR  2019      Social History     Tobacco Use    Smoking status: Former Smoker     Packs/day: 1.00     Years: 10.00     Pack years: 10.00     Types: Cigarettes     Start date: 1961     Last attempt to quit: 1971     Years since quittin.5    Smokeless tobacco: Never Used   Substance Use Topics    Alcohol use: No     Frequency: Never     Binge frequency: Never      Family History   Problem Relation Age of Onset    Hypertension Father     Heart Disease Father     Arthritis-osteo Mother     Cancer Brother 78        tumor in back    Heart Disease Brother     Diabetes Maternal Grandfather     Kidney Disease Maternal Grandfather      Current Outpatient Medications   Medication Sig    acetaminophen (TYLENOL EXTRA STRENGTH) 500 mg tablet Take 500 mg by mouth every six (6) hours as needed for Pain.  albuterol (VENTOLIN HFA) 90 mcg/actuation inhaler Take 2 Puffs by inhalation every four (4) hours as needed for Wheezing.  LORazepam (ATIVAN) 0.5 mg tablet Take 1 Tab by mouth every four (4) hours as needed for Anxiety. Max Daily Amount: 3 mg. Indications: Nausea and Vomiting caused by Cancer Drugs    rivaroxaban (XARELTO) 20 mg tab tablet Take 1 Tab by mouth daily (with breakfast). Indications: lung embolism    ondansetron (ZOFRAN ODT) 4 mg disintegrating tablet Take 1 Tab by mouth every eight (8) hours as needed for Nausea.  traMADol (ULTRAM) 50 mg tablet TAKE 1 TABLET BY MOUTH EVERY 6 HOURS AS NEEDED FOR PAIN MAX DAILY AMOUNT 200MG    omeprazole (PRILOSEC) 20 mg capsule Take 20 mg by mouth daily.  dexamethasone (DECADRON) 4 mg tablet Take 8 mg by mouth daily.  polyethylene glycol (MIRALAX) 17 gram/dose powder Take 17 g by mouth daily.     amLODIPine (NORVASC) 5 mg tablet Take 5 mg by mouth daily.  DULoxetine (CYMBALTA) 30 mg capsule TAKE ONE CAPSULE BY MOUTH EVERY DAY    tamsulosin (FLOMAX) 0.4 mg capsule TAKE 2 CAPSULES BY MOUTH NIGHLTY    rosuvastatin (CRESTOR) 10 mg tablet Take 10 mg by mouth nightly.  cyclobenzaprine (FLEXERIL) 10 mg tablet TAKE 1 TABLET BY MOUTH THREE (3) TIMES DAILY AS NEEDED FOR MUSCLE SPASM(S). (Patient taking differently: TAKE 1 TABLET BY MOUTH daily AS NEEDED FOR MUSCLE SPASM(S).)    METOPROLOL TARTRATE PO Take 12.5 mg by mouth two (2) times a day.  multivitamin (ONE A DAY) tablet Take 1 Tab by mouth daily.  aspirin 81 mg tablet Take 81 mg by mouth.  dutasteride (AVODART) 0.5 mg capsule Take 0.5 mg by mouth daily. No current facility-administered medications for this visit. Facility-Administered Medications Ordered in Other Visits   Medication Dose Route Frequency    sodium chloride (NS) flush 5-10 mL  5-10 mL IntraVENous PRN    heparin (porcine) pf 500 Units  500 Units IntraVENous PRN      No Known Allergies     Review of Systems: A complete review of systems was obtained, negative except as described above. Physical Exam:   There were no vitals taken for this visit. ECOG PS: 1  General: No distress  Eyes: PERRLA, anicteric sclerae  HENT: Atraumatic, OP clear  Neck: Supple  Lymphatic: No cervical, supraclavicular, or inguinal adenopathy  Respiratory: CTAB, normal respiratory effort  CV: Normal rate, regular rhythm, no murmurs, no peripheral edema  GI: Soft, nontender, nondistended, no masses, no hepatomegaly, no splenomegaly  MS: Normal gait and station. Digits without clubbing or cyanosis. Skin: No rashes, ecchymoses, or petechiae. Normal temperature, turgor, and texture.   Psych: Alert, oriented, appropriate affect, normal judgment/insight    Results:     Lab Results   Component Value Date/Time    WBC 3.2 (L) 06/24/2019 08:40 AM    HGB 10.7 (L) 06/24/2019 08:40 AM    HCT 33.7 (L) 06/24/2019 08:40 AM    PLATELET 288 71/88/3945 08:40 AM    .8 (H) 06/24/2019 08:40 AM    ABS. NEUTROPHILS 1.6 (L) 06/24/2019 08:40 AM     Lab Results   Component Value Date/Time    Sodium 141 06/24/2019 08:40 AM    Potassium 4.1 06/24/2019 08:40 AM    Chloride 106 06/24/2019 08:40 AM    CO2 27 06/24/2019 08:40 AM    Glucose 120 (H) 06/24/2019 08:40 AM    BUN 20 06/24/2019 08:40 AM    Creatinine 0.89 06/24/2019 08:40 AM    GFR est AA >60 06/24/2019 08:40 AM    GFR est non-AA >60 06/24/2019 08:40 AM    Calcium 8.3 (L) 06/24/2019 08:40 AM    Glucose (POC) 112 (H) 01/12/2012 06:32 AM     Lab Results   Component Value Date/Time    Bilirubin, total 0.3 06/24/2019 08:40 AM    ALT (SGPT) 27 06/24/2019 08:40 AM    AST (SGOT) 25 06/24/2019 08:40 AM    Alk. phosphatase 66 06/24/2019 08:40 AM    Protein, total 5.9 (L) 06/24/2019 08:40 AM    Albumin 3.1 (L) 06/24/2019 08:40 AM    Globulin 2.8 06/24/2019 08:40 AM       CT C/A/P 4/5/2019  Resolved pulmonary emboli possible persistent right femoral vein DVT. Positive carcinomatosis with interval decrease in amount of ascites. Incidental cholelithiasis, lower lumbar fusion and hepatic cysts    CT C/A/P 6/21/2019  1. Mild interval decrease in size of pancreatic head/neck mass. 2. Stable peritoneal carcinomatosis. New pocket of loculated ascites underneath  the right hemidiaphragm measuring 3.3 x 2.7 x 2.3 cm.  3. No evidence of metastatic disease in the chest.  4. Cholelithiasis. Assessment:   1) Metastatic Pancreatic Carcinoma with Peritoneal Metastasis  2) Neoplasm Pain  3) PE    Plan:   1) Continue with dose reduced FOLFIRINOX-Repeat Imaging shows its decreasing in size so its correlating with his CA 19-9 which continues to drop.  Will contact us if things change.  Continue neulasta day 3 each cycle.  Will plan on seeing him in 1 month. 2) Pain is well controlled currently--not requiring tramadol at this time.   Is seeing his orthopedist for his hip pain.   3) Continue Xarelto 20mg daily    Signed By: Dejuan Del Real Jana Lujan MD

## 2019-06-26 NOTE — PROGRESS NOTES
Aspen Malhotra is a 77 y.o. male here today for f/u pancreatic cancer. He is receiving FolFirinox. He has occasional pain in his L hip, but has been out of Percocet since Tuesday last week, and has not needed pain. Chemotherapy Flowsheet 6/24/2019   Cycle C10D1   Date 6/24/2019   Drug / Regimen Folfirinox   Dosage -   Time Up -   Time Down -   Pre Hydration -   Pre Meds Emend, Zofran, Atropine, Decadron   Notes CIV 5FU pump     Key Oncology Meds             ondansetron (ZOFRAN ODT) 4 mg disintegrating tablet Take 1 Tab by mouth every eight (8) hours as needed for Nausea.

## 2019-06-27 RX ORDER — OXYCODONE AND ACETAMINOPHEN 5; 325 MG/1; MG/1
2 TABLET ORAL
Qty: 90 TAB | Refills: 0 | Status: SHIPPED | OUTPATIENT
Start: 2019-06-27 | End: 2019-07-11

## 2019-07-16 RX ORDER — ONDANSETRON 2 MG/ML
8 INJECTION INTRAMUSCULAR; INTRAVENOUS AS NEEDED
Status: CANCELLED | OUTPATIENT
Start: 2019-07-22

## 2019-07-16 RX ORDER — ALBUTEROL SULFATE 0.83 MG/ML
2.5 SOLUTION RESPIRATORY (INHALATION) AS NEEDED
Status: CANCELLED
Start: 2019-07-22

## 2019-07-16 RX ORDER — HEPARIN 100 UNIT/ML
300-500 SYRINGE INTRAVENOUS AS NEEDED
Status: CANCELLED
Start: 2019-07-24

## 2019-07-16 RX ORDER — FLUOROURACIL 50 MG/ML
300 INJECTION, SOLUTION INTRAVENOUS ONCE
Status: CANCELLED
Start: 2019-07-22 | End: 2019-07-22

## 2019-07-16 RX ORDER — ATROPINE SULFATE 0.4 MG/ML
0.4 INJECTION, SOLUTION ENDOTRACHEAL; INTRAMEDULLARY; INTRAMUSCULAR; INTRAVENOUS; SUBCUTANEOUS
Status: CANCELLED | OUTPATIENT
Start: 2019-07-22

## 2019-07-16 RX ORDER — HEPARIN 100 UNIT/ML
300-500 SYRINGE INTRAVENOUS AS NEEDED
Status: CANCELLED
Start: 2019-07-22

## 2019-07-16 RX ORDER — ONDANSETRON 2 MG/ML
8 INJECTION INTRAMUSCULAR; INTRAVENOUS ONCE
Status: CANCELLED | OUTPATIENT
Start: 2019-07-22

## 2019-07-16 RX ORDER — DIPHENHYDRAMINE HYDROCHLORIDE 50 MG/ML
50 INJECTION, SOLUTION INTRAMUSCULAR; INTRAVENOUS AS NEEDED
Status: CANCELLED
Start: 2019-07-22

## 2019-07-16 RX ORDER — SODIUM CHLORIDE 9 MG/ML
10 INJECTION INTRAMUSCULAR; INTRAVENOUS; SUBCUTANEOUS AS NEEDED
Status: CANCELLED | OUTPATIENT
Start: 2019-07-24

## 2019-07-16 RX ORDER — ACETAMINOPHEN 325 MG/1
650 TABLET ORAL AS NEEDED
Status: CANCELLED
Start: 2019-07-22

## 2019-07-16 RX ORDER — HYDROCORTISONE SODIUM SUCCINATE 100 MG/2ML
100 INJECTION, POWDER, FOR SOLUTION INTRAMUSCULAR; INTRAVENOUS AS NEEDED
Status: CANCELLED | OUTPATIENT
Start: 2019-07-22

## 2019-07-16 RX ORDER — SODIUM CHLORIDE 0.9 % (FLUSH) 0.9 %
10 SYRINGE (ML) INJECTION AS NEEDED
Status: CANCELLED
Start: 2019-07-22

## 2019-07-16 RX ORDER — SODIUM CHLORIDE 9 MG/ML
10 INJECTION INTRAMUSCULAR; INTRAVENOUS; SUBCUTANEOUS AS NEEDED
Status: CANCELLED | OUTPATIENT
Start: 2019-07-22

## 2019-07-16 RX ORDER — ATROPINE SULFATE 0.4 MG/ML
0.4 INJECTION, SOLUTION ENDOTRACHEAL; INTRAMEDULLARY; INTRAMUSCULAR; INTRAVENOUS; SUBCUTANEOUS ONCE
Status: CANCELLED | OUTPATIENT
Start: 2019-07-22

## 2019-07-16 RX ORDER — EPINEPHRINE 1 MG/ML
0.3 INJECTION, SOLUTION, CONCENTRATE INTRAVENOUS AS NEEDED
Status: CANCELLED | OUTPATIENT
Start: 2019-07-22

## 2019-07-16 RX ORDER — DEXTROSE MONOHYDRATE 50 MG/ML
25 INJECTION, SOLUTION INTRAVENOUS CONTINUOUS
Status: CANCELLED
Start: 2019-07-22

## 2019-07-16 RX ORDER — SODIUM CHLORIDE 0.9 % (FLUSH) 0.9 %
10 SYRINGE (ML) INJECTION AS NEEDED
Status: CANCELLED
Start: 2019-07-24

## 2019-07-19 ENCOUNTER — TELEPHONE (OUTPATIENT)
Dept: ONCOLOGY | Age: 79
End: 2019-07-19

## 2019-07-19 NOTE — TELEPHONE ENCOUNTER
Patient called, he's in a lot of pain. He went to see Dr Yogesh Ochoa to help with Arthritis, no relief. Dr Yogesh Ochoa referred pt to Physical Therapy, but pt feel like he's unable to do it. Pt is scheduled to have a MRI on Thus & a apt with Onc on Wed. Pt wants to know if its ok to have the MRI.  Please call patient @ [phone]

## 2019-07-24 ENCOUNTER — OFFICE VISIT (OUTPATIENT)
Dept: ONCOLOGY | Age: 79
End: 2019-07-24

## 2019-07-24 VITALS
RESPIRATION RATE: 18 BRPM | HEIGHT: 72 IN | WEIGHT: 177.4 LBS | OXYGEN SATURATION: 96 % | DIASTOLIC BLOOD PRESSURE: 68 MMHG | HEART RATE: 58 BPM | BODY MASS INDEX: 24.03 KG/M2 | SYSTOLIC BLOOD PRESSURE: 128 MMHG | TEMPERATURE: 98 F

## 2019-07-24 DIAGNOSIS — C25.0 MALIGNANT NEOPLASM OF HEAD OF PANCREAS (HCC): Primary | ICD-10-CM

## 2019-07-24 DIAGNOSIS — G89.3 NEOPLASM RELATED PAIN: ICD-10-CM

## 2019-07-24 RX ORDER — ROSUVASTATIN CALCIUM 10 MG/1
10 TABLET, COATED ORAL
Qty: 90 TAB | Refills: 3 | Status: SHIPPED | OUTPATIENT
Start: 2019-07-24 | End: 2019-09-27 | Stop reason: SDUPTHER

## 2019-07-24 RX ORDER — OXYCODONE AND ACETAMINOPHEN 5; 325 MG/1; MG/1
2 TABLET ORAL
Qty: 90 TAB | Refills: 0 | Status: SHIPPED | OUTPATIENT
Start: 2019-07-24 | End: 2019-08-02 | Stop reason: SDUPTHER

## 2019-07-24 NOTE — PROGRESS NOTES
Reason for Visit:   Hudson Das is a 77 y.o. male who is seen for follow up of Pancreatic Cancer    Treatment History:   Dx: Metastatic Pancreatic Adenocarcinoma--Nov 2018--peritoneal mets  Tx: FOLFIRINOX--first cycle 2/4/19, second cycle 2/18/19, third cycle 3/4/19, fourth cycle on 3/18/19, 5th on 4/1/19, 6th 4/15/2019, 7th 4/29/19, 8th 5/13/19, 9th 6/5/19 (dose reduced due to toxicity), 10th 6/24/19  Goal: prolong survival, Cycle length: until progression, Will return to see me prior to each cycle    History of Present Illness:   Doing well, no major issues, wt stable, still with a lot of hip pain, seeing ortho. Past Medical History:   Diagnosis Date    CAD (coronary artery disease)     3 cardiac stents   heart Dr. Ginny Grimm Cervical spondylosis without myelopathy 2009    Chronic pain     shoulder left, upper lumbar    Depressive disorder, not elsewhere classified     Diaphragmatic hernia without mention of obstruction or gangrene     GERD (gastroesophageal reflux disease)     Hypercholesterolemia     Hypertension     Hypertrophy of prostate without urinary obstruction and other lower urinary tract symptoms (LUTS) 2008    Insomnia, unspecified 2009    Lumbosacral spondylosis without myelopathy 2009    Osteoarthrosis involving, or with mention of more than one site, but not specified as generalized, multiple sites 2010    Varicose veins 12/11/2014      Past Surgical History:   Procedure Laterality Date    CABG, ARTERY-VEIN, TWO  1/9/12    CABG    HX APPENDECTOMY  1962    HX CATARACT REMOVAL  2015    bilateral    HX COLONOSCOPY  2010    x2 small beign polyps    HX COLONOSCOPY  2017    hyperplastic polyp    HX ENDOSCOPY  2010    acid reflux    HX HEART CATHETERIZATION  1998, 2001    3 stents    HX HEENT  1962    fx nose repair    HX HERNIA REPAIR  early 1980's    left inguinal, with mesh    HX ORTHOPAEDIC  2017    lumbar fusion    HX OTHER SURGICAL  7 yrs.  old Hiatal Hernia    HX TONSILLECTOMY      HX VASCULAR ACCESS      IR INSERT TUNL CVAD W PORT LESS THAN 5 YR  2019      Social History     Tobacco Use    Smoking status: Former Smoker     Packs/day: 1.00     Years: 10.00     Pack years: 10.00     Types: Cigarettes     Start date: 1961     Last attempt to quit: 1971     Years since quittin.5    Smokeless tobacco: Never Used   Substance Use Topics    Alcohol use: No     Frequency: Never     Binge frequency: Never      Family History   Problem Relation Age of Onset    Hypertension Father     Heart Disease Father     Arthritis-osteo Mother     Cancer Brother 78        tumor in back    Heart Disease Brother     Diabetes Maternal Grandfather     Kidney Disease Maternal Grandfather      Current Outpatient Medications   Medication Sig    oxyCODONE-acetaminophen (PERCOCET) 5-325 mg per tablet Take 2 Tabs by mouth every four (4) hours as needed for Pain.  DULoxetine (CYMBALTA) 30 mg capsule TAKE 1 CAPSULE BY MOUTH EVERY DAY    ondansetron (ZOFRAN ODT) 4 mg disintegrating tablet Take 1 Tab by mouth every eight (8) hours as needed for Nausea. Indications: Nausea and Vomiting caused by Cancer Drugs    acetaminophen (TYLENOL EXTRA STRENGTH) 500 mg tablet Take 500 mg by mouth every six (6) hours as needed for Pain.  albuterol (VENTOLIN HFA) 90 mcg/actuation inhaler Take 2 Puffs by inhalation every four (4) hours as needed for Wheezing.  LORazepam (ATIVAN) 0.5 mg tablet Take 1 Tab by mouth every four (4) hours as needed for Anxiety. Max Daily Amount: 3 mg. Indications: Nausea and Vomiting caused by Cancer Drugs    rivaroxaban (XARELTO) 20 mg tab tablet Take 1 Tab by mouth daily (with breakfast). Indications: lung embolism    traMADol (ULTRAM) 50 mg tablet TAKE 1 TABLET BY MOUTH EVERY 6 HOURS AS NEEDED FOR PAIN MAX DAILY AMOUNT 200MG    omeprazole (PRILOSEC) 20 mg capsule Take 20 mg by mouth daily.     dexamethasone (DECADRON) 4 mg tablet Take 8 mg by mouth daily.  polyethylene glycol (MIRALAX) 17 gram/dose powder Take 17 g by mouth daily.  amLODIPine (NORVASC) 5 mg tablet Take 5 mg by mouth daily.  tamsulosin (FLOMAX) 0.4 mg capsule TAKE 2 CAPSULES BY MOUTH NIGHLTY    rosuvastatin (CRESTOR) 10 mg tablet Take 10 mg by mouth nightly.  cyclobenzaprine (FLEXERIL) 10 mg tablet TAKE 1 TABLET BY MOUTH THREE (3) TIMES DAILY AS NEEDED FOR MUSCLE SPASM(S). (Patient taking differently: TAKE 1 TABLET BY MOUTH daily AS NEEDED FOR MUSCLE SPASM(S).)    METOPROLOL TARTRATE PO Take 12.5 mg by mouth two (2) times a day.  multivitamin (ONE A DAY) tablet Take 1 Tab by mouth daily.  aspirin 81 mg tablet Take 81 mg by mouth.  dutasteride (AVODART) 0.5 mg capsule Take 0.5 mg by mouth daily. No current facility-administered medications for this visit. Facility-Administered Medications Ordered in Other Visits   Medication Dose Route Frequency    fluorouracil (ADRUCIL) 3,546 mg in 0.9% sodium chloride 100 mL CADD Cassette  1,800 mg/m2 (Treatment Plan Recorded) IntraVENous ONCE      No Known Allergies     Review of Systems: A complete review of systems was obtained, negative except as described above. Physical Exam:   There were no vitals taken for this visit. ECOG PS: 1  General: No distress  Eyes: PERRLA, anicteric sclerae  HENT: Atraumatic, OP clear  Neck: Supple  Lymphatic: No cervical, supraclavicular, or inguinal adenopathy  Respiratory: CTAB, normal respiratory effort  CV: Normal rate, regular rhythm, no murmurs, no peripheral edema  GI: Soft, nontender, nondistended, no masses, no hepatomegaly, no splenomegaly  MS: Normal gait and station. Digits without clubbing or cyanosis. Skin: No rashes, ecchymoses, or petechiae. Normal temperature, turgor, and texture.   Psych: Alert, oriented, appropriate affect, normal judgment/insight    Results:     Lab Results   Component Value Date/Time    WBC 3.6 (L) 07/22/2019 08:50 AM    HGB 9.9 (L) 07/22/2019 08:50 AM    HCT 30.0 (L) 07/22/2019 08:50 AM    PLATELET 742 (L) 96/42/5634 08:50 AM    MCV 99.0 07/22/2019 08:50 AM    ABS. NEUTROPHILS 2.0 07/22/2019 08:50 AM     Lab Results   Component Value Date/Time    Sodium 143 07/22/2019 08:50 AM    Potassium 4.3 07/22/2019 08:50 AM    Chloride 107 07/22/2019 08:50 AM    CO2 26 07/22/2019 08:50 AM    Glucose 115 (H) 07/22/2019 08:50 AM    BUN 19 07/22/2019 08:50 AM    Creatinine 0.86 07/22/2019 08:50 AM    GFR est AA >60 07/22/2019 08:50 AM    GFR est non-AA >60 07/22/2019 08:50 AM    Calcium 8.4 (L) 07/22/2019 08:50 AM    Glucose (POC) 112 (H) 01/12/2012 06:32 AM     Lab Results   Component Value Date/Time    Bilirubin, total 0.2 07/22/2019 08:50 AM    ALT (SGPT) 24 07/22/2019 08:50 AM    AST (SGOT) 21 07/22/2019 08:50 AM    Alk. phosphatase 54 07/22/2019 08:50 AM    Protein, total 5.7 (L) 07/22/2019 08:50 AM    Albumin 3.2 (L) 07/22/2019 08:50 AM    Globulin 2.5 07/22/2019 08:50 AM     CT C/A/P 4/5/2019  Resolved pulmonary emboli possible persistent right femoral vein DVT. Positive carcinomatosis with interval decrease in amount of ascites. Incidental cholelithiasis, lower lumbar fusion and hepatic cysts     CT C/A/P 6/21/2019  1. Mild interval decrease in size of pancreatic head/neck mass. 2. Stable peritoneal carcinomatosis. New pocket of loculated ascites underneath  the right hemidiaphragm measuring 3.3 x 2.7 x 2.3 cm.  3. No evidence of metastatic disease in the chest.  4. Cholelithiasis    Assessment:   1) Metastatic Pancreatic Carcinoma with Peritoneal Metastasis  2) Neoplasm Pain  3) PE      Plan:   1) Continue with dose reduced FOLFIRINOX-Repeat Imaging shows its decreasing in size so its correlating with his CA 19-9 which continues to drop.  Will contact us if things change.   Will plan on seeing him in 1 month.    2) Pain is well controlled currently--not requiring tramadol at this time.   Is seeing his orthopedist for his hip pain.   3) Continue Xarelto 20mg daily    Signed By: Zhao Solorio MD

## 2019-07-24 NOTE — PROGRESS NOTES
Angela Faria is a 77 y.o. male here for f/u, he feel nausea on rarely, and does not need zofran often. Chemotherapy Flowsheet 7/22/2019   Cycle C 12 D1   Date 7/22/2019   Drug / Regimen Folfurinox   Dosage see MAR   Time Up -   Time Down -   Pre Hydration -   Pre Meds Emend, zofran, decadron. atropine   Notes CIV 5FU 3546 mg initiated     Key Pain Meds             oxyCODONE-acetaminophen (PERCOCET) 5-325 mg per tablet Take 2 Tabs by mouth every four (4) hours as needed for Pain. acetaminophen (TYLENOL EXTRA STRENGTH) 500 mg tablet Take 500 mg by mouth every six (6) hours as needed for Pain.    traMADol (ULTRAM) 50 mg tablet TAKE 1 TABLET BY MOUTH EVERY 6 HOURS AS NEEDED FOR PAIN MAX DAILY AMOUNT 200MG        Key Oncology Meds             ondansetron (ZOFRAN ODT) 4 mg disintegrating tablet Take 1 Tab by mouth every eight (8) hours as needed for Nausea.  Indications: Nausea and Vomiting caused by Cancer Drugs

## 2019-07-29 RX ORDER — TAMSULOSIN HYDROCHLORIDE 0.4 MG/1
CAPSULE ORAL
Qty: 180 CAP | Refills: 1 | Status: SHIPPED | OUTPATIENT
Start: 2019-07-29 | End: 2020-01-26

## 2019-07-31 RX ORDER — ONDANSETRON 2 MG/ML
8 INJECTION INTRAMUSCULAR; INTRAVENOUS ONCE
Status: CANCELLED | OUTPATIENT
Start: 2019-08-05

## 2019-07-31 RX ORDER — ACETAMINOPHEN 325 MG/1
650 TABLET ORAL AS NEEDED
Status: CANCELLED
Start: 2019-08-05

## 2019-07-31 RX ORDER — SODIUM CHLORIDE 0.9 % (FLUSH) 0.9 %
10 SYRINGE (ML) INJECTION AS NEEDED
Status: CANCELLED
Start: 2019-08-07

## 2019-07-31 RX ORDER — HYDROCORTISONE SODIUM SUCCINATE 100 MG/2ML
100 INJECTION, POWDER, FOR SOLUTION INTRAMUSCULAR; INTRAVENOUS AS NEEDED
Status: CANCELLED | OUTPATIENT
Start: 2019-08-05

## 2019-07-31 RX ORDER — HEPARIN 100 UNIT/ML
300-500 SYRINGE INTRAVENOUS AS NEEDED
Status: CANCELLED
Start: 2019-08-05

## 2019-07-31 RX ORDER — SODIUM CHLORIDE 0.9 % (FLUSH) 0.9 %
10 SYRINGE (ML) INJECTION AS NEEDED
Status: CANCELLED
Start: 2019-08-05

## 2019-07-31 RX ORDER — DEXTROSE MONOHYDRATE 50 MG/ML
25 INJECTION, SOLUTION INTRAVENOUS CONTINUOUS
Status: CANCELLED
Start: 2019-08-05

## 2019-07-31 RX ORDER — ONDANSETRON 2 MG/ML
8 INJECTION INTRAMUSCULAR; INTRAVENOUS AS NEEDED
Status: CANCELLED | OUTPATIENT
Start: 2019-08-05

## 2019-07-31 RX ORDER — SODIUM CHLORIDE 9 MG/ML
10 INJECTION INTRAMUSCULAR; INTRAVENOUS; SUBCUTANEOUS AS NEEDED
Status: CANCELLED | OUTPATIENT
Start: 2019-08-05

## 2019-07-31 RX ORDER — DIPHENHYDRAMINE HYDROCHLORIDE 50 MG/ML
50 INJECTION, SOLUTION INTRAMUSCULAR; INTRAVENOUS AS NEEDED
Status: CANCELLED
Start: 2019-08-05

## 2019-07-31 RX ORDER — ATROPINE SULFATE 0.4 MG/ML
0.4 INJECTION, SOLUTION ENDOTRACHEAL; INTRAMEDULLARY; INTRAMUSCULAR; INTRAVENOUS; SUBCUTANEOUS ONCE
Status: CANCELLED | OUTPATIENT
Start: 2019-08-05

## 2019-07-31 RX ORDER — FLUOROURACIL 50 MG/ML
300 INJECTION, SOLUTION INTRAVENOUS ONCE
Status: CANCELLED
Start: 2019-08-05 | End: 2019-08-05

## 2019-07-31 RX ORDER — SODIUM CHLORIDE 9 MG/ML
10 INJECTION INTRAMUSCULAR; INTRAVENOUS; SUBCUTANEOUS AS NEEDED
Status: CANCELLED | OUTPATIENT
Start: 2019-08-07

## 2019-07-31 RX ORDER — ATROPINE SULFATE 0.4 MG/ML
0.4 INJECTION, SOLUTION ENDOTRACHEAL; INTRAMEDULLARY; INTRAMUSCULAR; INTRAVENOUS; SUBCUTANEOUS
Status: CANCELLED | OUTPATIENT
Start: 2019-08-05

## 2019-07-31 RX ORDER — ALBUTEROL SULFATE 0.83 MG/ML
2.5 SOLUTION RESPIRATORY (INHALATION) AS NEEDED
Status: CANCELLED
Start: 2019-08-05

## 2019-07-31 RX ORDER — HEPARIN 100 UNIT/ML
300-500 SYRINGE INTRAVENOUS AS NEEDED
Status: CANCELLED
Start: 2019-08-07

## 2019-07-31 RX ORDER — EPINEPHRINE 1 MG/ML
0.3 INJECTION, SOLUTION, CONCENTRATE INTRAVENOUS AS NEEDED
Status: CANCELLED | OUTPATIENT
Start: 2019-08-05

## 2019-08-02 DIAGNOSIS — C25.0 MALIGNANT NEOPLASM OF HEAD OF PANCREAS (HCC): ICD-10-CM

## 2019-08-02 DIAGNOSIS — G89.3 NEOPLASM RELATED PAIN: ICD-10-CM

## 2019-08-02 RX ORDER — OXYCODONE AND ACETAMINOPHEN 5; 325 MG/1; MG/1
2 TABLET ORAL
Qty: 90 TAB | Refills: 0 | Status: SHIPPED | OUTPATIENT
Start: 2019-08-02 | End: 2019-08-16

## 2019-08-13 ENCOUNTER — TELEPHONE (OUTPATIENT)
Dept: ONCOLOGY | Age: 79
End: 2019-08-13

## 2019-08-14 RX ORDER — ATROPINE SULFATE 0.4 MG/ML
0.4 INJECTION, SOLUTION ENDOTRACHEAL; INTRAMEDULLARY; INTRAMUSCULAR; INTRAVENOUS; SUBCUTANEOUS
Status: CANCELLED | OUTPATIENT
Start: 2019-08-19

## 2019-08-14 RX ORDER — DIPHENHYDRAMINE HYDROCHLORIDE 50 MG/ML
50 INJECTION, SOLUTION INTRAMUSCULAR; INTRAVENOUS AS NEEDED
Status: CANCELLED
Start: 2019-08-19

## 2019-08-14 RX ORDER — HYDROCORTISONE SODIUM SUCCINATE 100 MG/2ML
100 INJECTION, POWDER, FOR SOLUTION INTRAMUSCULAR; INTRAVENOUS AS NEEDED
Status: CANCELLED | OUTPATIENT
Start: 2019-08-19

## 2019-08-14 RX ORDER — SODIUM CHLORIDE 0.9 % (FLUSH) 0.9 %
10 SYRINGE (ML) INJECTION AS NEEDED
Status: CANCELLED
Start: 2019-08-21

## 2019-08-14 RX ORDER — SODIUM CHLORIDE 9 MG/ML
10 INJECTION INTRAMUSCULAR; INTRAVENOUS; SUBCUTANEOUS AS NEEDED
Status: CANCELLED | OUTPATIENT
Start: 2019-08-19

## 2019-08-14 RX ORDER — ALBUTEROL SULFATE 0.83 MG/ML
2.5 SOLUTION RESPIRATORY (INHALATION) AS NEEDED
Status: CANCELLED
Start: 2019-08-19

## 2019-08-14 RX ORDER — FLUOROURACIL 50 MG/ML
300 INJECTION, SOLUTION INTRAVENOUS ONCE
Status: CANCELLED
Start: 2019-08-19 | End: 2019-08-19

## 2019-08-14 RX ORDER — HEPARIN 100 UNIT/ML
300-500 SYRINGE INTRAVENOUS AS NEEDED
Status: CANCELLED
Start: 2019-08-21

## 2019-08-14 RX ORDER — HEPARIN 100 UNIT/ML
300-500 SYRINGE INTRAVENOUS AS NEEDED
Status: CANCELLED
Start: 2019-08-19

## 2019-08-14 RX ORDER — ONDANSETRON 2 MG/ML
8 INJECTION INTRAMUSCULAR; INTRAVENOUS AS NEEDED
Status: CANCELLED | OUTPATIENT
Start: 2019-08-19

## 2019-08-14 RX ORDER — DEXTROSE MONOHYDRATE 50 MG/ML
25 INJECTION, SOLUTION INTRAVENOUS CONTINUOUS
Status: CANCELLED
Start: 2019-08-19

## 2019-08-14 RX ORDER — SODIUM CHLORIDE 0.9 % (FLUSH) 0.9 %
10 SYRINGE (ML) INJECTION AS NEEDED
Status: CANCELLED
Start: 2019-08-19

## 2019-08-14 RX ORDER — SODIUM CHLORIDE 9 MG/ML
10 INJECTION INTRAMUSCULAR; INTRAVENOUS; SUBCUTANEOUS AS NEEDED
Status: CANCELLED | OUTPATIENT
Start: 2019-08-21

## 2019-08-14 RX ORDER — ONDANSETRON 2 MG/ML
8 INJECTION INTRAMUSCULAR; INTRAVENOUS ONCE
Status: CANCELLED | OUTPATIENT
Start: 2019-08-19

## 2019-08-14 RX ORDER — ATROPINE SULFATE 0.4 MG/ML
0.4 INJECTION, SOLUTION ENDOTRACHEAL; INTRAMEDULLARY; INTRAMUSCULAR; INTRAVENOUS; SUBCUTANEOUS ONCE
Status: CANCELLED | OUTPATIENT
Start: 2019-08-19

## 2019-08-14 RX ORDER — ACETAMINOPHEN 325 MG/1
650 TABLET ORAL AS NEEDED
Status: CANCELLED
Start: 2019-08-19

## 2019-08-14 RX ORDER — EPINEPHRINE 1 MG/ML
0.3 INJECTION, SOLUTION, CONCENTRATE INTRAVENOUS AS NEEDED
Status: CANCELLED | OUTPATIENT
Start: 2019-08-19

## 2019-08-16 ENCOUNTER — TELEPHONE (OUTPATIENT)
Dept: ONCOLOGY | Age: 79
End: 2019-08-16

## 2019-08-16 NOTE — TELEPHONE ENCOUNTER
HIPAA verified. Relayed to patient Dr Marielena Key has okayed injections, but patient will need labs in our office the day before to ensure his counts are okay. Mr Manuel Valencia had asked if he can take Lyrica or Gabapentin for his pain. Per Dr Marielena Key they would be okay. Mr Manuel Valencia would like to know if Dr Marielena Key would write WT'I?
Chest Pain

## 2019-08-19 ENCOUNTER — TELEPHONE (OUTPATIENT)
Dept: ONCOLOGY | Age: 79
End: 2019-08-19

## 2019-08-19 DIAGNOSIS — G89.3 NEOPLASM RELATED PAIN: Primary | ICD-10-CM

## 2019-08-20 NOTE — PROGRESS NOTES
Ness Waldrop is a 77 y.o. male here for f/u pancreatic cancer. Mr Andrez Henley has asked for Lyrica or Gabapentin to help with his pain. He wants to talk about having Neulasta type injections after chemo. Patient had his pump removed today. Chemotherapy Flowsheet 8/19/2019   Cycle C 14 D 1   Date 8/19/2019   Drug / Regimen Folfirinox   Dosage see MAR   Time Up -   Time Down -   Pre Hydration -   Pre Meds Emend,Zofran,Decadron   Notes CIV 5 FU 3546     Key Oncology Meds             ondansetron (ZOFRAN ODT) 4 mg disintegrating tablet Take 1 Tab by mouth every eight (8) hours as needed for Nausea.  Indications: Nausea and Vomiting caused by Cancer Drugs        Key Pain Meds             acetaminophen (TYLENOL EXTRA STRENGTH) 500 mg tablet Take 500 mg by mouth every six (6) hours as needed for Pain.    traMADol (ULTRAM) 50 mg tablet TAKE 1 TABLET BY MOUTH EVERY 6 HOURS AS NEEDED FOR PAIN MAX DAILY AMOUNT 200MG

## 2019-08-21 ENCOUNTER — OFFICE VISIT (OUTPATIENT)
Dept: ONCOLOGY | Age: 79
End: 2019-08-21

## 2019-08-21 VITALS
RESPIRATION RATE: 19 BRPM | OXYGEN SATURATION: 96 % | HEART RATE: 67 BPM | SYSTOLIC BLOOD PRESSURE: 111 MMHG | HEIGHT: 72 IN | BODY MASS INDEX: 23.16 KG/M2 | DIASTOLIC BLOOD PRESSURE: 64 MMHG | WEIGHT: 171 LBS | TEMPERATURE: 98 F

## 2019-08-21 DIAGNOSIS — T45.1X5A CHEMOTHERAPY-INDUCED NEUROPATHY (HCC): ICD-10-CM

## 2019-08-21 DIAGNOSIS — C25.0 MALIGNANT NEOPLASM OF HEAD OF PANCREAS (HCC): Primary | ICD-10-CM

## 2019-08-21 DIAGNOSIS — G62.0 CHEMOTHERAPY-INDUCED NEUROPATHY (HCC): ICD-10-CM

## 2019-08-21 RX ORDER — OXYCODONE AND ACETAMINOPHEN 5; 325 MG/1; MG/1
2 TABLET ORAL
COMMUNITY
Start: 2019-07-10 | End: 2019-10-16 | Stop reason: SDUPTHER

## 2019-08-21 RX ORDER — OXYCODONE AND ACETAMINOPHEN 5; 325 MG/1; MG/1
2 TABLET ORAL
Qty: 90 TAB | Refills: 0 | Status: SHIPPED | OUTPATIENT
Start: 2019-08-21 | End: 2019-09-03 | Stop reason: SDUPTHER

## 2019-08-21 RX ORDER — PREGABALIN 75 MG/1
75 CAPSULE ORAL DAILY
Qty: 30 CAP | Refills: 0 | OUTPATIENT
Start: 2019-08-21

## 2019-08-21 RX ORDER — GABAPENTIN 300 MG/1
300 CAPSULE ORAL 3 TIMES DAILY
Qty: 90 CAP | Refills: 1 | Status: SHIPPED | OUTPATIENT
Start: 2019-08-21 | End: 2019-12-02 | Stop reason: SDUPTHER

## 2019-08-21 NOTE — PROGRESS NOTES
Reason for Visit:   Gertrudis Camarillo is a 77 y.o. male who is seen for follow up pancreatic canceer    Treatment History:   Dx: Metastatic Pancreatic Adenocarcinoma--Nov 2018--peritoneal mets  Tx: FOLFIRINOX--first cycle 2/4/19, second cycle 2/18/19, third cycle 3/4/19, fourth cycle on 3/18/19, 5th on 4/1/19, 6th 4/15/2019, 7th 4/29/19, 8th 5/13/19, 9th 6/5/19 (dose reduced due to toxicity), 10th 6/24/19  Goal: prolong survival, Cycle length: until progression, Will return to see me prior to each cycle    History of Present Illness:   Completing Cycle 14--doing well, no n/v/d/constipaiton. Having increased pain--arthritic, wants to know if lyrica or gabapentin can help. Tried two lyrica a few days ago and helped but made him very woozy.       Past Medical History:   Diagnosis Date    CAD (coronary artery disease)     3 cardiac stents   heart Dr. Zurdo Wallace Cervical spondylosis without myelopathy 2009    Chronic pain     shoulder left, upper lumbar    Depressive disorder, not elsewhere classified     Diaphragmatic hernia without mention of obstruction or gangrene     GERD (gastroesophageal reflux disease)     Hypercholesterolemia     Hypertension     Hypertrophy of prostate without urinary obstruction and other lower urinary tract symptoms (LUTS) 2008    Insomnia, unspecified 2009    Lumbosacral spondylosis without myelopathy 2009    Osteoarthrosis involving, or with mention of more than one site, but not specified as generalized, multiple sites 2010    Varicose veins 12/11/2014      Past Surgical History:   Procedure Laterality Date    CABG, ARTERY-VEIN, TWO  1/9/12    CABG    HX APPENDECTOMY  1962    HX CATARACT REMOVAL  2015    bilateral    HX COLONOSCOPY  2010    x2 small beign polyps    HX COLONOSCOPY  2017    hyperplastic polyp    HX ENDOSCOPY  2010    acid reflux    HX HEART CATHETERIZATION  1998, 2001    3 stents    HX HEENT  1962    fx nose repair    HX HERNIA REPAIR early 's    left inguinal, with mesh    HX ORTHOPAEDIC  2017    lumbar fusion    HX OTHER SURGICAL  7 yrs. old    Hiatal Hernia    HX TONSILLECTOMY      HX VASCULAR ACCESS      IR INSERT TUNL CVAD W PORT LESS THAN 5 YR  2019      Social History     Tobacco Use    Smoking status: Former Smoker     Packs/day: 1.00     Years: 10.00     Pack years: 10.00     Types: Cigarettes     Start date: 1961     Last attempt to quit: 1971     Years since quittin.6    Smokeless tobacco: Never Used   Substance Use Topics    Alcohol use: No     Frequency: Never     Binge frequency: Never      Family History   Problem Relation Age of Onset    Hypertension Father     Heart Disease Father     Arthritis-osteo Mother     Cancer Brother 78        tumor in back    Heart Disease Brother     Diabetes Maternal Grandfather     Kidney Disease Maternal Grandfather      Current Outpatient Medications   Medication Sig    tamsulosin (FLOMAX) 0.4 mg capsule TAKE 2 CAPSULE BY MOUTH NIGHTLY.  rosuvastatin (CRESTOR) 10 mg tablet Take 1 Tab by mouth nightly. Indications: excessive fat in the blood    DULoxetine (CYMBALTA) 30 mg capsule TAKE 1 CAPSULE BY MOUTH EVERY DAY    ondansetron (ZOFRAN ODT) 4 mg disintegrating tablet Take 1 Tab by mouth every eight (8) hours as needed for Nausea. Indications: Nausea and Vomiting caused by Cancer Drugs    acetaminophen (TYLENOL EXTRA STRENGTH) 500 mg tablet Take 500 mg by mouth every six (6) hours as needed for Pain.  albuterol (VENTOLIN HFA) 90 mcg/actuation inhaler Take 2 Puffs by inhalation every four (4) hours as needed for Wheezing.  LORazepam (ATIVAN) 0.5 mg tablet Take 1 Tab by mouth every four (4) hours as needed for Anxiety. Max Daily Amount: 3 mg. Indications: Nausea and Vomiting caused by Cancer Drugs    rivaroxaban (XARELTO) 20 mg tab tablet Take 1 Tab by mouth daily (with breakfast).  Indications: lung embolism    traMADol (ULTRAM) 50 mg tablet TAKE 1 TABLET BY MOUTH EVERY 6 HOURS AS NEEDED FOR PAIN MAX DAILY AMOUNT 200MG    omeprazole (PRILOSEC) 20 mg capsule Take 20 mg by mouth daily.  dexamethasone (DECADRON) 4 mg tablet Take 8 mg by mouth daily.  polyethylene glycol (MIRALAX) 17 gram/dose powder Take 17 g by mouth daily.  amLODIPine (NORVASC) 5 mg tablet Take 5 mg by mouth daily.  tamsulosin (FLOMAX) 0.4 mg capsule TAKE 2 CAPSULES BY MOUTH NIGHLTY    cyclobenzaprine (FLEXERIL) 10 mg tablet TAKE 1 TABLET BY MOUTH THREE (3) TIMES DAILY AS NEEDED FOR MUSCLE SPASM(S). (Patient taking differently: TAKE 1 TABLET BY MOUTH daily AS NEEDED FOR MUSCLE SPASM(S).)    METOPROLOL TARTRATE PO Take 12.5 mg by mouth two (2) times a day.  multivitamin (ONE A DAY) tablet Take 1 Tab by mouth daily.  aspirin 81 mg tablet Take 81 mg by mouth.  dutasteride (AVODART) 0.5 mg capsule Take 0.5 mg by mouth daily. No current facility-administered medications for this visit. Facility-Administered Medications Ordered in Other Visits   Medication Dose Route Frequency    sodium chloride (NS) flush 5-10 mL  5-10 mL InterCATHeter Per Protocol    heparin (porcine) pf 500 Units  500 Units IntraVENous PRN    saline peripheral flush soln 10 mL  10 mL InterCATHeter PRN    sodium chloride 0.9% injection 10 mL  10 mL IntraVENous PRN    heparin (porcine) pf 300-500 Units  300-500 Units InterCATHeter PRN      No Known Allergies     Review of Systems: A complete review of systems was obtained, negative except as described above.     Physical Exam:     Visit Vitals  /64   Pulse 67   Temp 98 °F (36.7 °C) (Oral)   Resp 19   Ht 6' (1.829 m)   Wt 171 lb (77.6 kg)   SpO2 96%   BMI 23.19 kg/m²     ECOG PS: 1  General: No distress  Eyes: PERRLA, anicteric sclerae  HENT: Atraumatic, OP clear  Neck: Supple  Lymphatic: No cervical, supraclavicular, or inguinal adenopathy  Respiratory: CTAB, normal respiratory effort  CV: Normal rate, regular rhythm, no murmurs, no peripheral edema  GI: Soft, nontender, nondistended, no masses, no hepatomegaly, no splenomegaly  MS: Normal gait and station. Digits without clubbing or cyanosis. Skin: No rashes, ecchymoses, or petechiae. Normal temperature, turgor, and texture. Psych: Alert, oriented, appropriate affect, normal judgment/insight    Results:     Lab Results   Component Value Date/Time    WBC 3.2 (L) 08/19/2019 08:15 AM    HGB 10.4 (L) 08/19/2019 08:15 AM    HCT 31.8 (L) 08/19/2019 08:15 AM    PLATELET 636 (L) 81/29/9364 08:15 AM    .3 (H) 08/19/2019 08:15 AM    ABS. NEUTROPHILS 1.7 (L) 08/19/2019 08:15 AM     Lab Results   Component Value Date/Time    Sodium 140 08/19/2019 08:15 AM    Potassium 4.5 08/19/2019 08:15 AM    Chloride 104 08/19/2019 08:15 AM    CO2 25 08/19/2019 08:15 AM    Glucose 130 (H) 08/19/2019 08:15 AM    BUN 22 (H) 08/19/2019 08:15 AM    Creatinine 1.11 08/19/2019 08:15 AM    GFR est AA >60 08/19/2019 08:15 AM    GFR est non-AA >60 08/19/2019 08:15 AM    Calcium 8.7 08/19/2019 08:15 AM    Glucose (POC) 112 (H) 01/12/2012 06:32 AM     Lab Results   Component Value Date/Time    Bilirubin, total 0.3 08/19/2019 08:15 AM    ALT (SGPT) 23 08/19/2019 08:15 AM    AST (SGOT) 17 08/19/2019 08:15 AM    Alk. phosphatase 59 08/19/2019 08:15 AM    Protein, total 6.0 (L) 08/19/2019 08:15 AM    Albumin 3.2 (L) 08/19/2019 08:15 AM    Globulin 2.8 08/19/2019 08:15 AM       CT C/A/P 4/5/2019  Resolved pulmonary emboli possible persistent right femoral vein DVT. Positive carcinomatosis with interval decrease in amount of ascites. Incidental cholelithiasis, lower lumbar fusion and hepatic cysts     CT C/A/P 6/21/2019  1. Mild interval decrease in size of pancreatic head/neck mass. 2. Stable peritoneal carcinomatosis.  New pocket of loculated ascites underneath  the right hemidiaphragm measuring 3.3 x 2.7 x 2.3 cm.  3. No evidence of metastatic disease in the chest.  4. Cholelithiasis     Assessment:   1) Metastatic Pancreatic Carcinoma with Peritoneal Metastasis  2) Neoplasm Pain  3) PE        Plan:   1) Continue with dose reduced FOLFIRINOX-Repeat Imaging shows its decreasing in size so its correlating with his CA 19-9 which continues to drop.  Will contact us if things change.   Will plan on seeing him in 1 month.    2) Pain is well controlled currently--not requiring tramadol at this time.   Is seeing his orthopedist for his hip pain. Add gabapentin 300mg tid for neuropathic component.   3) Continue Xarelto 20mg daily      Signed By: Gerri Almanza MD

## 2019-08-28 RX ORDER — HEPARIN 100 UNIT/ML
300-500 SYRINGE INTRAVENOUS AS NEEDED
Status: CANCELLED
Start: 2019-09-05

## 2019-08-28 RX ORDER — DIPHENHYDRAMINE HYDROCHLORIDE 50 MG/ML
50 INJECTION, SOLUTION INTRAMUSCULAR; INTRAVENOUS AS NEEDED
Status: CANCELLED
Start: 2019-09-23

## 2019-08-28 RX ORDER — FLUOROURACIL 50 MG/ML
300 INJECTION, SOLUTION INTRAVENOUS ONCE
Status: CANCELLED
Start: 2019-09-03 | End: 2019-09-03

## 2019-08-28 RX ORDER — ATROPINE SULFATE 0.4 MG/ML
0.4 INJECTION, SOLUTION ENDOTRACHEAL; INTRAMEDULLARY; INTRAMUSCULAR; INTRAVENOUS; SUBCUTANEOUS ONCE
Status: CANCELLED | OUTPATIENT
Start: 2019-09-23

## 2019-08-28 RX ORDER — ACETAMINOPHEN 325 MG/1
650 TABLET ORAL AS NEEDED
Status: CANCELLED
Start: 2019-09-03

## 2019-08-28 RX ORDER — ALBUTEROL SULFATE 0.83 MG/ML
2.5 SOLUTION RESPIRATORY (INHALATION) AS NEEDED
Status: CANCELLED
Start: 2019-09-03

## 2019-08-28 RX ORDER — FLUOROURACIL 50 MG/ML
300 INJECTION, SOLUTION INTRAVENOUS ONCE
Status: CANCELLED
Start: 2019-09-23 | End: 2019-09-23

## 2019-08-28 RX ORDER — ONDANSETRON 2 MG/ML
8 INJECTION INTRAMUSCULAR; INTRAVENOUS AS NEEDED
Status: CANCELLED | OUTPATIENT
Start: 2019-09-23

## 2019-08-28 RX ORDER — SODIUM CHLORIDE 0.9 % (FLUSH) 0.9 %
10 SYRINGE (ML) INJECTION AS NEEDED
Status: CANCELLED
Start: 2019-09-23

## 2019-08-28 RX ORDER — SODIUM CHLORIDE 0.9 % (FLUSH) 0.9 %
10 SYRINGE (ML) INJECTION AS NEEDED
Status: CANCELLED
Start: 2019-09-05

## 2019-08-28 RX ORDER — DIPHENHYDRAMINE HYDROCHLORIDE 50 MG/ML
50 INJECTION, SOLUTION INTRAMUSCULAR; INTRAVENOUS AS NEEDED
Status: CANCELLED
Start: 2019-09-03

## 2019-08-28 RX ORDER — ACETAMINOPHEN 325 MG/1
650 TABLET ORAL AS NEEDED
Status: CANCELLED
Start: 2019-09-23

## 2019-08-28 RX ORDER — ATROPINE SULFATE 0.4 MG/ML
0.4 INJECTION, SOLUTION ENDOTRACHEAL; INTRAMEDULLARY; INTRAMUSCULAR; INTRAVENOUS; SUBCUTANEOUS ONCE
Status: CANCELLED | OUTPATIENT
Start: 2019-09-03

## 2019-08-28 RX ORDER — ATROPINE SULFATE 0.4 MG/ML
0.4 INJECTION, SOLUTION ENDOTRACHEAL; INTRAMEDULLARY; INTRAMUSCULAR; INTRAVENOUS; SUBCUTANEOUS
Status: CANCELLED | OUTPATIENT
Start: 2019-09-23

## 2019-08-28 RX ORDER — HEPARIN 100 UNIT/ML
300-500 SYRINGE INTRAVENOUS AS NEEDED
Status: CANCELLED
Start: 2019-09-03

## 2019-08-28 RX ORDER — DEXTROSE MONOHYDRATE 50 MG/ML
25 INJECTION, SOLUTION INTRAVENOUS CONTINUOUS
Status: CANCELLED
Start: 2019-09-23

## 2019-08-28 RX ORDER — DEXTROSE MONOHYDRATE 50 MG/ML
25 INJECTION, SOLUTION INTRAVENOUS CONTINUOUS
Status: CANCELLED
Start: 2019-09-03

## 2019-08-28 RX ORDER — ATROPINE SULFATE 0.4 MG/ML
0.4 INJECTION, SOLUTION ENDOTRACHEAL; INTRAMEDULLARY; INTRAMUSCULAR; INTRAVENOUS; SUBCUTANEOUS
Status: CANCELLED | OUTPATIENT
Start: 2019-09-03

## 2019-08-28 RX ORDER — HYDROCORTISONE SODIUM SUCCINATE 100 MG/2ML
100 INJECTION, POWDER, FOR SOLUTION INTRAMUSCULAR; INTRAVENOUS AS NEEDED
Status: CANCELLED | OUTPATIENT
Start: 2019-09-23

## 2019-08-28 RX ORDER — SODIUM CHLORIDE 9 MG/ML
10 INJECTION INTRAMUSCULAR; INTRAVENOUS; SUBCUTANEOUS AS NEEDED
Status: CANCELLED | OUTPATIENT
Start: 2019-09-03

## 2019-08-28 RX ORDER — HEPARIN 100 UNIT/ML
300-500 SYRINGE INTRAVENOUS AS NEEDED
Status: CANCELLED
Start: 2019-09-23

## 2019-08-28 RX ORDER — SODIUM CHLORIDE 9 MG/ML
10 INJECTION INTRAMUSCULAR; INTRAVENOUS; SUBCUTANEOUS AS NEEDED
Status: CANCELLED | OUTPATIENT
Start: 2019-09-25

## 2019-08-28 RX ORDER — HEPARIN 100 UNIT/ML
300-500 SYRINGE INTRAVENOUS AS NEEDED
Status: CANCELLED
Start: 2019-09-25

## 2019-08-28 RX ORDER — SODIUM CHLORIDE 9 MG/ML
10 INJECTION INTRAMUSCULAR; INTRAVENOUS; SUBCUTANEOUS AS NEEDED
Status: CANCELLED | OUTPATIENT
Start: 2019-09-23

## 2019-08-28 RX ORDER — SODIUM CHLORIDE 0.9 % (FLUSH) 0.9 %
10 SYRINGE (ML) INJECTION AS NEEDED
Status: CANCELLED
Start: 2019-09-25

## 2019-08-28 RX ORDER — SODIUM CHLORIDE 0.9 % (FLUSH) 0.9 %
10 SYRINGE (ML) INJECTION AS NEEDED
Status: CANCELLED
Start: 2019-09-03

## 2019-08-28 RX ORDER — ONDANSETRON 2 MG/ML
8 INJECTION INTRAMUSCULAR; INTRAVENOUS AS NEEDED
Status: CANCELLED | OUTPATIENT
Start: 2019-09-03

## 2019-08-28 RX ORDER — SODIUM CHLORIDE 9 MG/ML
10 INJECTION INTRAMUSCULAR; INTRAVENOUS; SUBCUTANEOUS AS NEEDED
Status: CANCELLED | OUTPATIENT
Start: 2019-09-05

## 2019-08-28 RX ORDER — ONDANSETRON 2 MG/ML
8 INJECTION INTRAMUSCULAR; INTRAVENOUS ONCE
Status: CANCELLED | OUTPATIENT
Start: 2019-09-23

## 2019-08-28 RX ORDER — ALBUTEROL SULFATE 0.83 MG/ML
2.5 SOLUTION RESPIRATORY (INHALATION) AS NEEDED
Status: CANCELLED
Start: 2019-09-23

## 2019-08-28 RX ORDER — HYDROCORTISONE SODIUM SUCCINATE 100 MG/2ML
100 INJECTION, POWDER, FOR SOLUTION INTRAMUSCULAR; INTRAVENOUS AS NEEDED
Status: CANCELLED | OUTPATIENT
Start: 2019-09-03

## 2019-08-28 RX ORDER — EPINEPHRINE 1 MG/ML
0.3 INJECTION, SOLUTION, CONCENTRATE INTRAVENOUS AS NEEDED
Status: CANCELLED | OUTPATIENT
Start: 2019-09-23

## 2019-08-28 RX ORDER — ONDANSETRON 2 MG/ML
8 INJECTION INTRAMUSCULAR; INTRAVENOUS ONCE
Status: CANCELLED | OUTPATIENT
Start: 2019-09-03

## 2019-08-28 RX ORDER — EPINEPHRINE 1 MG/ML
0.3 INJECTION, SOLUTION, CONCENTRATE INTRAVENOUS AS NEEDED
Status: CANCELLED | OUTPATIENT
Start: 2019-09-03

## 2019-09-03 DIAGNOSIS — G89.3 NEOPLASM RELATED PAIN: ICD-10-CM

## 2019-09-03 NOTE — TELEPHONE ENCOUNTER
Mrs Nancie Saenz in office with Mrs Alanis Bradley patient needs refill of Percocet 5/325.   They will be back in office on Thursday

## 2019-09-04 DIAGNOSIS — C25.0 MALIGNANT NEOPLASM OF HEAD OF PANCREAS (HCC): ICD-10-CM

## 2019-09-04 RX ORDER — OXYCODONE AND ACETAMINOPHEN 5; 325 MG/1; MG/1
2 TABLET ORAL
Qty: 90 TAB | Refills: 0 | Status: SHIPPED | OUTPATIENT
Start: 2019-09-04 | End: 2019-09-18 | Stop reason: SDUPTHER

## 2019-09-05 RX ORDER — NORTRIPTYLINE HYDROCHLORIDE 25 MG/1
25 CAPSULE ORAL
Qty: 30 CAP | Refills: 3 | Status: SHIPPED | OUTPATIENT
Start: 2019-09-05 | End: 2019-09-27 | Stop reason: SDUPTHER

## 2019-09-06 ENCOUNTER — TELEPHONE (OUTPATIENT)
Dept: ONCOLOGY | Age: 79
End: 2019-09-06

## 2019-09-06 DIAGNOSIS — C25.0 MALIGNANT NEOPLASM OF HEAD OF PANCREAS (HCC): Primary | ICD-10-CM

## 2019-09-16 ENCOUNTER — TELEPHONE (OUTPATIENT)
Dept: ONCOLOGY | Age: 79
End: 2019-09-16

## 2019-09-16 NOTE — TELEPHONE ENCOUNTER
Mr Jessica Gomez called, he would like to skip chemo this week, so that he can get back on a Monday schedule. Spoke with Dr Soumya Rodriguez, this will be okay. Called patient relayed message from Dr Soumya Rodriguez,   Patient is Rescheuled for 9/23/19 @ 0800 with [facility].

## 2019-09-17 NOTE — PROGRESS NOTES
Sweetie Morales is a 77 y.o. male here for f/u pancreatic cancer. Patient received Z7Y9 of FOLFIRINOX on 9/3/2019, he is scheduled to receive C8D1 on 9/23/19. Patient chose to skip 9/17/19, because he wants to get back on a Monday schedule for chemo. Gained 2 lbs over past 2 weeks. C/o neuropathy in hands and feet bilaterally, he is sometimes uncomfortable with driving due to numbness in L foot and ankle. Medications  Nortriptyline, Lyrica and gabapentin have not helped. Chemotherapy Flowsheet 9/5/2019   Cycle C 15 D 3   Date 9/5/2019   Drug / Regimen -   Dosage -   Time Up -   Time Down -   Pre Hydration -   Pre Meds -   Notes D/C pump       Key Oncology Meds             ondansetron (ZOFRAN ODT) 4 mg disintegrating tablet Take 1 Tab by mouth every eight (8) hours as needed for Nausea. Indications: Nausea and Vomiting caused by Cancer Drugs        Key Pain Meds             oxyCODONE-acetaminophen (PERCOCET) 5-325 mg per tablet Take 2 Tabs by mouth every four (4) hours as needed for Pain for up to 30 days. Max Daily Amount: 12 Tabs. oxyCODONE-acetaminophen (PERCOCET) 5-325 mg per tablet Take 2 Tabs by mouth every four (4) hours as needed.     acetaminophen (TYLENOL EXTRA STRENGTH) 500 mg tablet Take 500 mg by mouth every six (6) hours as needed for Pain.    traMADol (ULTRAM) 50 mg tablet TAKE 1 TABLET BY MOUTH EVERY 6 HOURS AS NEEDED FOR PAIN MAX DAILY AMOUNT 200MG

## 2019-09-18 ENCOUNTER — OFFICE VISIT (OUTPATIENT)
Dept: ONCOLOGY | Age: 79
End: 2019-09-18

## 2019-09-18 VITALS
SYSTOLIC BLOOD PRESSURE: 115 MMHG | WEIGHT: 176.4 LBS | BODY MASS INDEX: 23.89 KG/M2 | RESPIRATION RATE: 18 BRPM | HEART RATE: 61 BPM | OXYGEN SATURATION: 94 % | DIASTOLIC BLOOD PRESSURE: 60 MMHG | HEIGHT: 72 IN | TEMPERATURE: 98.1 F

## 2019-09-18 DIAGNOSIS — G89.3 NEOPLASM RELATED PAIN: ICD-10-CM

## 2019-09-18 DIAGNOSIS — C25.0 MALIGNANT NEOPLASM OF HEAD OF PANCREAS (HCC): Primary | ICD-10-CM

## 2019-09-18 RX ORDER — FENTANYL 12.5 UG/1
1 PATCH TRANSDERMAL
Qty: 10 PATCH | Refills: 0 | Status: SHIPPED | OUTPATIENT
Start: 2019-09-18 | End: 2019-10-09

## 2019-09-18 RX ORDER — DULOXETIN HYDROCHLORIDE 60 MG/1
30 CAPSULE, DELAYED RELEASE ORAL DAILY
Qty: 30 CAP | Refills: 4 | Status: SHIPPED | OUTPATIENT
Start: 2019-09-18 | End: 2019-09-27 | Stop reason: SDUPTHER

## 2019-09-18 RX ORDER — OXYCODONE AND ACETAMINOPHEN 5; 325 MG/1; MG/1
2 TABLET ORAL
Qty: 90 TAB | Refills: 0 | Status: SHIPPED | OUTPATIENT
Start: 2019-09-18 | End: 2019-10-18

## 2019-09-18 NOTE — PROGRESS NOTES
Reason for Visit:   Tuyet Field is a 77 y.o. male who is seen for follow up pancreatic cancer     Treatment History:   Dx: Metastatic Pancreatic Adenocarcinoma--Nov 2018--peritoneal mets  Tx: FOLFIRINOX--first cycle 2/4/19, second cycle 2/18/19, third cycle 3/4/19, fourth cycle on 3/18/19, 5th on 4/1/19, 6th 4/15/2019, 7th 4/29/19, 8th 5/13/19, 9th 6/5/19 (dose reduced due to toxicity), 10th 6/24/19, 11th 7/5/19, 12th 7/22/2019, 13th 8/5/2019, 14th 8/19/2019, 15th 8/9/2019, 16th 9/3/2019, 17th 9/23/2019  Goal: prolong survival, Cycle length: until progression, Will return to see me prior to each cycle    History of Present Illness:   Continues to do very well from the chemo perspective. His left hip pain is his limiting issue. Gets severe pain that radiates down the leg. Had cortisone shot that did help temporarily. Pain limits his activity dramatically. Oxycodone/Apap do help him function. Wakes in pain. Wt is stable and is eating well. Gabapentin, Nortriptyline are not helping.     Past Medical History:   Diagnosis Date    CAD (coronary artery disease)     3 cardiac stents   heart Dr. Henrietta Phillisp Cervical spondylosis without myelopathy 2009    Chronic pain     shoulder left, upper lumbar    Depressive disorder, not elsewhere classified     Diaphragmatic hernia without mention of obstruction or gangrene     GERD (gastroesophageal reflux disease)     Hypercholesterolemia     Hypertension     Hypertrophy of prostate without urinary obstruction and other lower urinary tract symptoms (LUTS) 2008    Insomnia, unspecified 2009    Lumbosacral spondylosis without myelopathy 2009    Osteoarthrosis involving, or with mention of more than one site, but not specified as generalized, multiple sites 2010    Varicose veins 12/11/2014      Past Surgical History:   Procedure Laterality Date    CABG, ARTERY-VEIN, TWO  1/9/12    CABG    HX APPENDECTOMY  1962    HX CATARACT REMOVAL  2015 bilateral    HX COLONOSCOPY  2010    x2 small beign polyps    HX COLONOSCOPY  2017    hyperplastic polyp    HX ENDOSCOPY  2010    acid reflux    HX HEART CATHETERIZATION  ,     3 stents    HX HEENT      fx nose repair    HX HERNIA REPAIR  early     left inguinal, with mesh    HX ORTHOPAEDIC  2017    lumbar fusion    HX OTHER SURGICAL  7 yrs. old    Hiatal Hernia    HX TONSILLECTOMY      HX VASCULAR ACCESS      IR INSERT TUNL CVAD W PORT LESS THAN 5 YR  2019      Social History     Tobacco Use    Smoking status: Former Smoker     Packs/day: 1.00     Years: 10.00     Pack years: 10.00     Types: Cigarettes     Start date: 1961     Last attempt to quit: 1971     Years since quittin.7    Smokeless tobacco: Never Used   Substance Use Topics    Alcohol use: No     Frequency: Never     Binge frequency: Never      Family History   Problem Relation Age of Onset    Hypertension Father     Heart Disease Father     Arthritis-osteo Mother     Cancer Brother 78        tumor in back    Heart Disease Brother     Diabetes Maternal Grandfather     Kidney Disease Maternal Grandfather      Current Outpatient Medications   Medication Sig    nortriptyline (PAMELOR) 25 mg capsule Take 1 Cap by mouth nightly.  oxyCODONE-acetaminophen (PERCOCET) 5-325 mg per tablet Take 2 Tabs by mouth every four (4) hours as needed for Pain for up to 30 days. Max Daily Amount: 12 Tabs.  oxyCODONE-acetaminophen (PERCOCET) 5-325 mg per tablet Take 2 Tabs by mouth every four (4) hours as needed.  gabapentin (NEURONTIN) 300 mg capsule Take 1 Cap by mouth three (3) times daily. Max Daily Amount: 900 mg. Indications: Neuropathic Pain    tamsulosin (FLOMAX) 0.4 mg capsule TAKE 2 CAPSULE BY MOUTH NIGHTLY.  rosuvastatin (CRESTOR) 10 mg tablet Take 1 Tab by mouth nightly.  Indications: excessive fat in the blood    DULoxetine (CYMBALTA) 30 mg capsule TAKE 1 CAPSULE BY MOUTH EVERY DAY    ondansetron (ZOFRAN ODT) 4 mg disintegrating tablet Take 1 Tab by mouth every eight (8) hours as needed for Nausea. Indications: Nausea and Vomiting caused by Cancer Drugs    acetaminophen (TYLENOL EXTRA STRENGTH) 500 mg tablet Take 500 mg by mouth every six (6) hours as needed for Pain.  albuterol (VENTOLIN HFA) 90 mcg/actuation inhaler Take 2 Puffs by inhalation every four (4) hours as needed for Wheezing.  LORazepam (ATIVAN) 0.5 mg tablet Take 1 Tab by mouth every four (4) hours as needed for Anxiety. Max Daily Amount: 3 mg. Indications: Nausea and Vomiting caused by Cancer Drugs    rivaroxaban (XARELTO) 20 mg tab tablet Take 1 Tab by mouth daily (with breakfast). Indications: lung embolism    traMADol (ULTRAM) 50 mg tablet TAKE 1 TABLET BY MOUTH EVERY 6 HOURS AS NEEDED FOR PAIN MAX DAILY AMOUNT 200MG    omeprazole (PRILOSEC) 20 mg capsule Take 20 mg by mouth daily.  dexamethasone (DECADRON) 4 mg tablet Take 8 mg by mouth daily.  polyethylene glycol (MIRALAX) 17 gram/dose powder Take 17 g by mouth daily.  amLODIPine (NORVASC) 5 mg tablet Take 5 mg by mouth daily.  cyclobenzaprine (FLEXERIL) 10 mg tablet TAKE 1 TABLET BY MOUTH THREE (3) TIMES DAILY AS NEEDED FOR MUSCLE SPASM(S). (Patient taking differently: TAKE 1 TABLET BY MOUTH daily AS NEEDED FOR MUSCLE SPASM(S).)    METOPROLOL TARTRATE PO Take 12.5 mg by mouth two (2) times a day.  multivitamin (ONE A DAY) tablet Take 1 Tab by mouth daily.  aspirin 81 mg tablet Take 81 mg by mouth.  dutasteride (AVODART) 0.5 mg capsule Take 0.5 mg by mouth daily. No current facility-administered medications for this visit. No Known Allergies     Review of Systems: A complete review of systems was obtained, negative except as described above. Physical Exam:   There were no vitals taken for this visit.   ECOG PS: 0  General: No distress  Eyes: PERRLA, anicteric sclerae  HENT: Atraumatic, OP clear  Neck: Supple  Lymphatic: No cervical, supraclavicular, or inguinal adenopathy  Respiratory: CTAB, normal respiratory effort  CV: Normal rate, regular rhythm, no murmurs, no peripheral edema  GI: Soft, nontender, nondistended, no masses, no hepatomegaly, no splenomegaly  MS: Normal gait and station. Digits without clubbing or cyanosis. Skin: No rashes, ecchymoses, or petechiae. Normal temperature, turgor, and texture. Psych: Alert, oriented, appropriate affect, normal judgment/insight    Results:     Lab Results   Component Value Date/Time    WBC 3.4 (L) 09/06/2019 10:35 AM    HGB 10.9 (L) 09/06/2019 10:35 AM    HCT 32.4 (L) 09/06/2019 10:35 AM    PLATELET 916 56/04/6541 10:35 AM    .0 (H) 09/06/2019 10:35 AM    ABS. NEUTROPHILS 2.0 09/06/2019 10:35 AM     Lab Results   Component Value Date/Time    Sodium 143 09/03/2019 08:44 AM    Potassium 4.2 09/03/2019 08:44 AM    Chloride 106 09/03/2019 08:44 AM    CO2 26 09/03/2019 08:44 AM    Glucose 138 (H) 09/03/2019 08:44 AM    BUN 10 09/03/2019 08:44 AM    Creatinine 0.84 09/03/2019 08:44 AM    GFR est AA >60 09/03/2019 08:44 AM    GFR est non-AA >60 09/03/2019 08:44 AM    Calcium 8.6 09/03/2019 08:44 AM    Glucose (POC) 112 (H) 01/12/2012 06:32 AM     Lab Results   Component Value Date/Time    Bilirubin, total 0.3 09/03/2019 08:44 AM    ALT (SGPT) 23 09/03/2019 08:44 AM    AST (SGOT) 24 09/03/2019 08:44 AM    Alk. phosphatase 61 09/03/2019 08:44 AM    Protein, total 5.9 (L) 09/03/2019 08:44 AM    Albumin 3.2 (L) 09/03/2019 08:44 AM    Globulin 2.7 09/03/2019 08:44 AM       CT C/A/P 4/5/2019  Resolved pulmonary emboli possible persistent right femoral vein DVT. Positive carcinomatosis with interval decrease in amount of ascites. Incidental cholelithiasis, lower lumbar fusion and hepatic cysts     CT C/A/P 6/21/2019  1. Mild interval decrease in size of pancreatic head/neck mass. 2. Stable peritoneal carcinomatosis.  New pocket of loculated ascites underneath  the right hemidiaphragm measuring 3.3 x 2.7 x 2.3 cm.  3. No evidence of metastatic disease in the chest.  4. Cholelithiasis     Assessment:   1) Metastatic Pancreatic Carcinoma with Peritoneal Metastasis  2) Neoplasm Pain  3) PE     Plan:   1) Continue with dose reduced FOLFIRINOX-Repeat Imaging shows its decreasing in size so its correlating with his CA 19-9 which continues to drop.  Will contact us if things change.   Will plan on seeing him in 1 month.    2) Pain is not well controlled. Will add Fentanyl 12mcg patch every 3 days, continue oxycodone/apap prn. Stop the nortriptyline and gabapentin. Increase Cymbalta to 60mg daily.   3) Continue Xarelto 20mg daily      Signed By: Musa Palafox MD

## 2019-09-23 DIAGNOSIS — C25.0 MALIGNANT NEOPLASM OF HEAD OF PANCREAS (HCC): ICD-10-CM

## 2019-09-25 DIAGNOSIS — C25.0 MALIGNANT NEOPLASM OF HEAD OF PANCREAS (HCC): Primary | ICD-10-CM

## 2019-09-26 DIAGNOSIS — T45.1X5A CHEMOTHERAPY-INDUCED NEUROPATHY (HCC): ICD-10-CM

## 2019-09-26 DIAGNOSIS — I26.99 OTHER PULMONARY EMBOLISM WITHOUT ACUTE COR PULMONALE, UNSPECIFIED CHRONICITY (HCC): ICD-10-CM

## 2019-09-26 DIAGNOSIS — C25.0 MALIGNANT NEOPLASM OF HEAD OF PANCREAS (HCC): ICD-10-CM

## 2019-09-26 DIAGNOSIS — G62.0 CHEMOTHERAPY-INDUCED NEUROPATHY (HCC): ICD-10-CM

## 2019-09-26 NOTE — TELEPHONE ENCOUNTER
Patient called would like discuss changing all of his refills to 90 days. Please call back @ [phone]

## 2019-09-27 RX ORDER — DEXAMETHASONE 4 MG/1
8 TABLET ORAL DAILY
Qty: 180 TAB | Refills: 3 | Status: SHIPPED | OUTPATIENT
Start: 2019-09-27

## 2019-09-27 RX ORDER — ROSUVASTATIN CALCIUM 10 MG/1
10 TABLET, COATED ORAL
Qty: 90 TAB | Refills: 3 | Status: SHIPPED | OUTPATIENT
Start: 2019-09-27

## 2019-09-27 RX ORDER — DULOXETIN HYDROCHLORIDE 60 MG/1
30 CAPSULE, DELAYED RELEASE ORAL DAILY
Qty: 30 CAP | Refills: 4 | Status: SHIPPED | OUTPATIENT
Start: 2019-09-27 | End: 2020-04-07 | Stop reason: SDUPTHER

## 2019-09-27 RX ORDER — NORTRIPTYLINE HYDROCHLORIDE 25 MG/1
25 CAPSULE ORAL
Qty: 90 CAP | Refills: 3 | Status: SHIPPED | OUTPATIENT
Start: 2019-09-27 | End: 2019-10-16

## 2019-10-02 RX ORDER — DIPHENHYDRAMINE HYDROCHLORIDE 50 MG/ML
50 INJECTION, SOLUTION INTRAMUSCULAR; INTRAVENOUS AS NEEDED
Status: CANCELLED
Start: 2019-10-07

## 2019-10-02 RX ORDER — HEPARIN 100 UNIT/ML
300-500 SYRINGE INTRAVENOUS AS NEEDED
Status: CANCELLED
Start: 2019-10-09

## 2019-10-02 RX ORDER — ACETAMINOPHEN 325 MG/1
650 TABLET ORAL AS NEEDED
Status: CANCELLED
Start: 2019-10-07

## 2019-10-02 RX ORDER — HYDROCORTISONE SODIUM SUCCINATE 100 MG/2ML
100 INJECTION, POWDER, FOR SOLUTION INTRAMUSCULAR; INTRAVENOUS AS NEEDED
Status: CANCELLED | OUTPATIENT
Start: 2019-10-07

## 2019-10-02 RX ORDER — FLUOROURACIL 50 MG/ML
300 INJECTION, SOLUTION INTRAVENOUS ONCE
Status: CANCELLED
Start: 2019-10-07 | End: 2019-10-07

## 2019-10-02 RX ORDER — HEPARIN 100 UNIT/ML
300-500 SYRINGE INTRAVENOUS AS NEEDED
Status: CANCELLED
Start: 2019-10-07

## 2019-10-02 RX ORDER — SODIUM CHLORIDE 0.9 % (FLUSH) 0.9 %
10 SYRINGE (ML) INJECTION AS NEEDED
Status: CANCELLED
Start: 2019-10-09

## 2019-10-02 RX ORDER — SODIUM CHLORIDE 0.9 % (FLUSH) 0.9 %
10 SYRINGE (ML) INJECTION AS NEEDED
Status: CANCELLED
Start: 2019-10-07

## 2019-10-02 RX ORDER — SODIUM CHLORIDE 9 MG/ML
10 INJECTION INTRAMUSCULAR; INTRAVENOUS; SUBCUTANEOUS AS NEEDED
Status: CANCELLED | OUTPATIENT
Start: 2019-10-09

## 2019-10-02 RX ORDER — SODIUM CHLORIDE 9 MG/ML
10 INJECTION INTRAMUSCULAR; INTRAVENOUS; SUBCUTANEOUS AS NEEDED
Status: CANCELLED | OUTPATIENT
Start: 2019-10-07

## 2019-10-02 RX ORDER — ATROPINE SULFATE 0.4 MG/ML
0.4 INJECTION, SOLUTION ENDOTRACHEAL; INTRAMEDULLARY; INTRAMUSCULAR; INTRAVENOUS; SUBCUTANEOUS ONCE
Status: CANCELLED | OUTPATIENT
Start: 2019-10-07

## 2019-10-02 RX ORDER — EPINEPHRINE 1 MG/ML
0.3 INJECTION, SOLUTION, CONCENTRATE INTRAVENOUS AS NEEDED
Status: CANCELLED | OUTPATIENT
Start: 2019-10-07

## 2019-10-02 RX ORDER — ONDANSETRON 2 MG/ML
8 INJECTION INTRAMUSCULAR; INTRAVENOUS AS NEEDED
Status: CANCELLED | OUTPATIENT
Start: 2019-10-07

## 2019-10-02 RX ORDER — ATROPINE SULFATE 0.4 MG/ML
0.4 INJECTION, SOLUTION ENDOTRACHEAL; INTRAMEDULLARY; INTRAMUSCULAR; INTRAVENOUS; SUBCUTANEOUS
Status: CANCELLED | OUTPATIENT
Start: 2019-10-07

## 2019-10-02 RX ORDER — ALBUTEROL SULFATE 0.83 MG/ML
2.5 SOLUTION RESPIRATORY (INHALATION) AS NEEDED
Status: CANCELLED
Start: 2019-10-07

## 2019-10-02 RX ORDER — DEXTROSE MONOHYDRATE 50 MG/ML
25 INJECTION, SOLUTION INTRAVENOUS CONTINUOUS
Status: CANCELLED
Start: 2019-10-07

## 2019-10-02 RX ORDER — ONDANSETRON 2 MG/ML
8 INJECTION INTRAMUSCULAR; INTRAVENOUS ONCE
Status: CANCELLED | OUTPATIENT
Start: 2019-10-07

## 2019-10-07 DIAGNOSIS — C25.0 MALIGNANT NEOPLASM OF HEAD OF PANCREAS (HCC): ICD-10-CM

## 2019-10-09 RX ORDER — FENTANYL 25 UG/1
1 PATCH TRANSDERMAL
Qty: 10 PATCH | Refills: 0 | Status: SHIPPED | OUTPATIENT
Start: 2019-10-09 | End: 2019-11-08

## 2019-10-15 NOTE — PROGRESS NOTES
Abeba Gist a 77 y. o. male here for f/u pancreatic cancer. Patient completed C9D1 on 10/7/19, he is scheduled for C10D1 on 10/21/19. C/o back pain, he is riding his stationary bike daily if possible. Lost 4 lbs since 10/9/19. Patient had a CT C/A/P on 10/11/19. Chemotherapy Flowsheet 10/9/2019   Cycle C 17 D 3   Date 10/9/2019   Drug / Regimen -   Dosage -   Time Up -   Time Down -   Pre Hydration -   Pre Meds -   Notes D/C pump     Key Oncology Meds             ondansetron (ZOFRAN ODT) 4 mg disintegrating tablet Take 1 Tab by mouth every eight (8) hours as needed for Nausea. Indications: Nausea and Vomiting caused by Cancer Drugs        Key Pain Meds             fentaNYL (DURAGESIC) 25 mcg/hr PATCH 1 Patch by TransDERmal route every seventy-two (72) hours for 30 days. Max Daily Amount: 1 Patch. oxyCODONE-acetaminophen (PERCOCET) 5-325 mg per tablet Take 2 Tabs by mouth every four (4) hours as needed for Pain for up to 30 days. Max Daily Amount: 12 Tabs. oxyCODONE-acetaminophen (PERCOCET) 5-325 mg per tablet Take 2 Tabs by mouth every four (4) hours as needed. acetaminophen (TYLENOL EXTRA STRENGTH) 500 mg tablet Take 500 mg by mouth every six (6) hours as needed for Pain.    traMADol (ULTRAM) 50 mg tablet TAKE 1 TABLET BY MOUTH EVERY 6 HOURS AS NEEDED FOR PAIN MAX DAILY AMOUNT 200MG        VO per Dr Reji kovacs to remove tramadol, nortriptyline and flexeril from [facility]

## 2019-10-16 ENCOUNTER — OFFICE VISIT (OUTPATIENT)
Dept: ONCOLOGY | Age: 79
End: 2019-10-16

## 2019-10-16 VITALS
WEIGHT: 173 LBS | HEART RATE: 64 BPM | DIASTOLIC BLOOD PRESSURE: 60 MMHG | OXYGEN SATURATION: 94 % | SYSTOLIC BLOOD PRESSURE: 105 MMHG | RESPIRATION RATE: 24 BRPM | HEIGHT: 72 IN | BODY MASS INDEX: 23.43 KG/M2 | TEMPERATURE: 98.4 F

## 2019-10-16 DIAGNOSIS — C25.0 MALIGNANT NEOPLASM OF HEAD OF PANCREAS (HCC): Primary | ICD-10-CM

## 2019-10-16 NOTE — PROGRESS NOTES
Reason for Visit:   Nick Almonte a 77 y. o. male who is seen for follow up pancreatic cancer     Treatment History:   Dx: Metastatic Pancreatic Adenocarcinoma--Nov 2018--peritoneal mets  Tx: FOLFIRINOX--first cycle 2/4/19, second cycle 2/18/19, third cycle 3/4/19, fourth cycle on 3/18/19, 5th on 4/1/19, 6th 4/15/2019, 7th 4/29/19, 8th 5/13/19, 9th 6/5/19 (dose reduced due to toxicity), 10th 6/24/19, 11th 7/5/19, 12th 7/22/2019, 13th 8/5/2019, 14th 8/19/2019, 15th 8/9/2019, 16th 9/3/2019, 17th 9/23/2019, 18th 10/7/2019  Goal: prolong survival, Cycle length: until progression, Will return to see me prior to each cycle    History of Present Illness:   Doing well, going for second steroid shot for the oa. Main pain is down the lateral portion of the left leg--hoping the steroid will manage that. No problems with nausea or constipation. Appetite is ok--sweets are preferred--is eating and maintaining weight. Doesn't take the percocet during the day--feels like it makes him more tired and unsteady. Is sleeping through the night since starting the fentanyl.     Past Medical History:   Diagnosis Date    CAD (coronary artery disease)     3 cardiac stents   heart Dr. Isidoro Tang Cervical spondylosis without myelopathy 2009    Chronic pain     shoulder left, upper lumbar    Depressive disorder, not elsewhere classified     Diaphragmatic hernia without mention of obstruction or gangrene     GERD (gastroesophageal reflux disease)     Hypercholesterolemia     Hypertension     Hypertrophy of prostate without urinary obstruction and other lower urinary tract symptoms (LUTS) 2008    Insomnia, unspecified 2009    Lumbosacral spondylosis without myelopathy 2009    Osteoarthrosis involving, or with mention of more than one site, but not specified as generalized, multiple sites 2010    Varicose veins 12/11/2014      Past Surgical History:   Procedure Laterality Date    CABG, ARTERY-VEIN, TWO 12    CABG    HX APPENDECTOMY      HX CATARACT REMOVAL  2015    bilateral    HX COLONOSCOPY  2010    x2 small beign polyps    HX COLONOSCOPY  2017    hyperplastic polyp    HX ENDOSCOPY  2010    acid reflux    HX HEART CATHETERIZATION  ,     3 stents    HX HEENT      fx nose repair    HX HERNIA REPAIR  early     left inguinal, with mesh    HX ORTHOPAEDIC  2017    lumbar fusion    HX OTHER SURGICAL  7 yrs. old    Hiatal Hernia    HX TONSILLECTOMY      HX VASCULAR ACCESS      IR INSERT TUNL CVAD W PORT LESS THAN 5 YR  2019      Social History     Tobacco Use    Smoking status: Former Smoker     Packs/day: 1.00     Years: 10.00     Pack years: 10.00     Types: Cigarettes     Start date: 1961     Last attempt to quit: 1971     Years since quittin.8    Smokeless tobacco: Never Used   Substance Use Topics    Alcohol use: No     Frequency: Never     Binge frequency: Never      Family History   Problem Relation Age of Onset    Hypertension Father     Heart Disease Father     Arthritis-osteo Mother     Cancer Brother 78        tumor in back    Heart Disease Brother     Diabetes Maternal Grandfather     Kidney Disease Maternal Grandfather      Current Outpatient Medications   Medication Sig    fentaNYL (DURAGESIC) 25 mcg/hr PATCH 1 Patch by TransDERmal route every seventy-two (72) hours for 30 days. Max Daily Amount: 1 Patch.  dexAMETHasone (DECADRON) 4 mg tablet Take 8 mg by mouth daily. Indications: Prevent Nausea and Vomiting from Cancer Chemotherapy    DULoxetine (CYMBALTA) 60 mg capsule Take 1 Cap by mouth daily. Indications: Chronic Muscle or Bone Pain    rosuvastatin (CRESTOR) 10 mg tablet Take 1 Tab by mouth nightly. Indications: excessive fat in the blood    rivaroxaban (XARELTO) 20 mg tab tablet Take 1 Tab by mouth daily (with breakfast). Indications: a clot in the lung    nortriptyline (PAMELOR) 25 mg capsule Take 1 Cap by mouth nightly.     oxyCODONE-acetaminophen (PERCOCET) 5-325 mg per tablet Take 2 Tabs by mouth every four (4) hours as needed for Pain for up to 30 days. Max Daily Amount: 12 Tabs.  oxyCODONE-acetaminophen (PERCOCET) 5-325 mg per tablet Take 2 Tabs by mouth every four (4) hours as needed.  gabapentin (NEURONTIN) 300 mg capsule Take 1 Cap by mouth three (3) times daily. Max Daily Amount: 900 mg. Indications: Neuropathic Pain    tamsulosin (FLOMAX) 0.4 mg capsule TAKE 2 CAPSULE BY MOUTH NIGHTLY.  ondansetron (ZOFRAN ODT) 4 mg disintegrating tablet Take 1 Tab by mouth every eight (8) hours as needed for Nausea. Indications: Nausea and Vomiting caused by Cancer Drugs    acetaminophen (TYLENOL EXTRA STRENGTH) 500 mg tablet Take 500 mg by mouth every six (6) hours as needed for Pain.  albuterol (VENTOLIN HFA) 90 mcg/actuation inhaler Take 2 Puffs by inhalation every four (4) hours as needed for Wheezing.  LORazepam (ATIVAN) 0.5 mg tablet Take 1 Tab by mouth every four (4) hours as needed for Anxiety. Max Daily Amount: 3 mg. Indications: Nausea and Vomiting caused by Cancer Drugs    traMADol (ULTRAM) 50 mg tablet TAKE 1 TABLET BY MOUTH EVERY 6 HOURS AS NEEDED FOR PAIN MAX DAILY AMOUNT 200MG    omeprazole (PRILOSEC) 20 mg capsule Take 20 mg by mouth daily.  polyethylene glycol (MIRALAX) 17 gram/dose powder Take 17 g by mouth daily.  amLODIPine (NORVASC) 5 mg tablet Take 5 mg by mouth daily.  cyclobenzaprine (FLEXERIL) 10 mg tablet TAKE 1 TABLET BY MOUTH THREE (3) TIMES DAILY AS NEEDED FOR MUSCLE SPASM(S). (Patient taking differently: TAKE 1 TABLET BY MOUTH daily AS NEEDED FOR MUSCLE SPASM(S).)    METOPROLOL TARTRATE PO Take 12.5 mg by mouth two (2) times a day.  multivitamin (ONE A DAY) tablet Take 1 Tab by mouth daily.  aspirin 81 mg tablet Take 81 mg by mouth.  dutasteride (AVODART) 0.5 mg capsule Take 0.5 mg by mouth daily. No current facility-administered medications for this visit.        No Known Allergies     Review of Systems: A complete review of systems was obtained, negative except as described above. Physical Exam:     Visit Vitals  /60   Pulse 64   Temp 98.4 °F (36.9 °C) (Oral)   Resp 24   Ht 6' (1.829 m)   Wt 173 lb (78.5 kg)   SpO2 94%   BMI 23.46 kg/m²     ECOG PS: 1  General: No distress  Eyes: PERRLA, anicteric sclerae  HENT: Atraumatic, OP clear  Neck: Supple  Lymphatic: No cervical, supraclavicular, or inguinal adenopathy  Respiratory: CTAB, normal respiratory effort  CV: Normal rate, regular rhythm, no murmurs, no peripheral edema  GI: Soft, nontender, nondistended, no masses, no hepatomegaly, no splenomegaly  MS: Normal gait and station. Digits without clubbing or cyanosis. Skin: No rashes, ecchymoses, or petechiae. Normal temperature, turgor, and texture. Psych: Alert, oriented, appropriate affect, normal judgment/insight    Results:     Lab Results   Component Value Date/Time    WBC 3.4 (L) 10/07/2019 08:40 AM    HGB 9.9 (L) 10/07/2019 08:40 AM    HCT 30.6 (L) 10/07/2019 08:40 AM    PLATELET 937 (L) 10/44/7123 08:40 AM    .6 (H) 10/07/2019 08:40 AM    ABS. NEUTROPHILS 1.9 10/07/2019 08:40 AM     Lab Results   Component Value Date/Time    Sodium 142 10/07/2019 08:40 AM    Potassium 4.0 10/07/2019 08:40 AM    Chloride 106 10/07/2019 08:40 AM    CO2 26 10/07/2019 08:40 AM    Glucose 128 (H) 10/07/2019 08:40 AM    BUN 14 10/07/2019 08:40 AM    Creatinine 0.97 10/07/2019 08:40 AM    GFR est AA >60 10/07/2019 08:40 AM    GFR est non-AA >60 10/07/2019 08:40 AM    Calcium 8.5 10/07/2019 08:40 AM    Glucose (POC) 112 (H) 01/12/2012 06:32 AM     Lab Results   Component Value Date/Time    Bilirubin, total 0.3 10/07/2019 08:40 AM    ALT (SGPT) 24 10/07/2019 08:40 AM    AST (SGOT) 20 10/07/2019 08:40 AM    Alk.  phosphatase 55 10/07/2019 08:40 AM    Protein, total 5.8 (L) 10/07/2019 08:40 AM    Albumin 3.3 (L) 10/07/2019 08:40 AM    Globulin 2.5 10/07/2019 08:40 AM       CT C/A/P 4/5/2019  Resolved pulmonary emboli possible persistent right femoral vein DVT. Positive carcinomatosis with interval decrease in amount of ascites. Incidental cholelithiasis, lower lumbar fusion and hepatic cysts     CT C/A/P 6/21/2019  1. Mild interval decrease in size of pancreatic head/neck mass. 2. Stable peritoneal carcinomatosis. New pocket of loculated ascites underneath  the right hemidiaphragm measuring 3.3 x 2.7 x 2.3 cm.  3. No evidence of metastatic disease in the chest.  4. Cholelithiasis     CT C/A/P 10/11/2019  IMPRESSION:  1. Further decrease in the size of the previously described small pancreatic  head mass lesion. 2. Continued evidence of focal nodular lesions in the anterior aspect of the  peritoneum compatible with carcinomatosis. No evidence of para-aortic  lymphadenopathy. 3. No focal intrathoracic nodules/mass lesions identified. No evidence of  mediastinal or hilar lymphadenopathy. 4. Persistence of the previously described focal, loculated collection of  ascites in the region of the right hemidiaphragm. 5. Normal sized liver. Persistence of the previously described multiple focal  low-density areas within liver suggestive of cysts. 6. Normal sized spleen. No focal splenic lesions identified. 7. Normal appearance of the kidneys. 8. Suboptimal distention of the urinary bladder (see discussion above) disease. 9. Relative enlargement of the prostate gland.     Assessment:   1) Metastatic Pancreatic Carcinoma with Peritoneal Metastasis  2) Neoplasm Pain  3) PE     Plan:   1) Continue with dose reduced FOLFIRINOX-Repeat Imaging shows its decreasing in size so its correlating with his CA 19-9 which continues to drop.  Will contact us if things change.   Will plan on seeing him in 1 month.    2) Pain is not well controlled. Will continue  Fentanyl 25mcg patch every 3 days, continue oxycodone/apap prn. Stop the nortriptyline and gabapentin.   Increase Cymbalta to 60mg daily.   3) Continue Xarelto 20mg daily         Signed By: Cheryl Harding MD

## 2019-10-17 RX ORDER — ATROPINE SULFATE 0.4 MG/ML
0.4 INJECTION, SOLUTION ENDOTRACHEAL; INTRAMEDULLARY; INTRAMUSCULAR; INTRAVENOUS; SUBCUTANEOUS
Status: CANCELLED | OUTPATIENT
Start: 2019-10-21

## 2019-10-17 RX ORDER — DEXTROSE MONOHYDRATE 50 MG/ML
25 INJECTION, SOLUTION INTRAVENOUS CONTINUOUS
Status: CANCELLED
Start: 2019-10-21

## 2019-10-17 RX ORDER — HEPARIN 100 UNIT/ML
300-500 SYRINGE INTRAVENOUS AS NEEDED
Status: CANCELLED
Start: 2019-10-21

## 2019-10-17 RX ORDER — DIPHENHYDRAMINE HYDROCHLORIDE 50 MG/ML
50 INJECTION, SOLUTION INTRAMUSCULAR; INTRAVENOUS AS NEEDED
Status: CANCELLED
Start: 2019-10-21

## 2019-10-17 RX ORDER — SODIUM CHLORIDE 9 MG/ML
10 INJECTION INTRAMUSCULAR; INTRAVENOUS; SUBCUTANEOUS AS NEEDED
Status: CANCELLED | OUTPATIENT
Start: 2019-10-23

## 2019-10-17 RX ORDER — SODIUM CHLORIDE 9 MG/ML
10 INJECTION INTRAMUSCULAR; INTRAVENOUS; SUBCUTANEOUS AS NEEDED
Status: CANCELLED | OUTPATIENT
Start: 2019-10-21

## 2019-10-17 RX ORDER — ATROPINE SULFATE 0.4 MG/ML
0.4 INJECTION, SOLUTION ENDOTRACHEAL; INTRAMEDULLARY; INTRAMUSCULAR; INTRAVENOUS; SUBCUTANEOUS ONCE
Status: CANCELLED | OUTPATIENT
Start: 2019-10-21

## 2019-10-17 RX ORDER — ONDANSETRON 2 MG/ML
8 INJECTION INTRAMUSCULAR; INTRAVENOUS AS NEEDED
Status: CANCELLED | OUTPATIENT
Start: 2019-10-21

## 2019-10-17 RX ORDER — SODIUM CHLORIDE 0.9 % (FLUSH) 0.9 %
10 SYRINGE (ML) INJECTION AS NEEDED
Status: CANCELLED
Start: 2019-10-21

## 2019-10-17 RX ORDER — ACETAMINOPHEN 325 MG/1
650 TABLET ORAL AS NEEDED
Status: CANCELLED
Start: 2019-10-21

## 2019-10-17 RX ORDER — ALBUTEROL SULFATE 0.83 MG/ML
2.5 SOLUTION RESPIRATORY (INHALATION) AS NEEDED
Status: CANCELLED
Start: 2019-10-21

## 2019-10-17 RX ORDER — EPINEPHRINE 1 MG/ML
0.3 INJECTION, SOLUTION, CONCENTRATE INTRAVENOUS AS NEEDED
Status: CANCELLED | OUTPATIENT
Start: 2019-10-21

## 2019-10-17 RX ORDER — ONDANSETRON 2 MG/ML
8 INJECTION INTRAMUSCULAR; INTRAVENOUS ONCE
Status: CANCELLED | OUTPATIENT
Start: 2019-10-21

## 2019-10-17 RX ORDER — HEPARIN 100 UNIT/ML
300-500 SYRINGE INTRAVENOUS AS NEEDED
Status: CANCELLED
Start: 2019-10-23

## 2019-10-17 RX ORDER — FLUOROURACIL 50 MG/ML
300 INJECTION, SOLUTION INTRAVENOUS ONCE
Status: CANCELLED
Start: 2019-10-21 | End: 2019-10-21

## 2019-10-17 RX ORDER — HYDROCORTISONE SODIUM SUCCINATE 100 MG/2ML
100 INJECTION, POWDER, FOR SOLUTION INTRAMUSCULAR; INTRAVENOUS AS NEEDED
Status: CANCELLED | OUTPATIENT
Start: 2019-10-21

## 2019-10-17 RX ORDER — SODIUM CHLORIDE 0.9 % (FLUSH) 0.9 %
10 SYRINGE (ML) INJECTION AS NEEDED
Status: CANCELLED
Start: 2019-10-23

## 2019-10-30 RX ORDER — SODIUM CHLORIDE 0.9 % (FLUSH) 0.9 %
10 SYRINGE (ML) INJECTION AS NEEDED
Status: CANCELLED
Start: 2019-11-04

## 2019-10-30 RX ORDER — ACETAMINOPHEN 325 MG/1
650 TABLET ORAL AS NEEDED
Status: CANCELLED
Start: 2019-11-04

## 2019-10-30 RX ORDER — ATROPINE SULFATE 0.4 MG/ML
0.4 INJECTION, SOLUTION ENDOTRACHEAL; INTRAMEDULLARY; INTRAMUSCULAR; INTRAVENOUS; SUBCUTANEOUS ONCE
Status: CANCELLED | OUTPATIENT
Start: 2019-11-04

## 2019-10-30 RX ORDER — EPINEPHRINE 1 MG/ML
0.3 INJECTION, SOLUTION, CONCENTRATE INTRAVENOUS AS NEEDED
Status: CANCELLED | OUTPATIENT
Start: 2019-11-04

## 2019-10-30 RX ORDER — SODIUM CHLORIDE 0.9 % (FLUSH) 0.9 %
10 SYRINGE (ML) INJECTION AS NEEDED
Status: CANCELLED
Start: 2019-11-06

## 2019-10-30 RX ORDER — DIPHENHYDRAMINE HYDROCHLORIDE 50 MG/ML
50 INJECTION, SOLUTION INTRAMUSCULAR; INTRAVENOUS AS NEEDED
Status: CANCELLED
Start: 2019-11-04

## 2019-10-30 RX ORDER — HEPARIN 100 UNIT/ML
300-500 SYRINGE INTRAVENOUS AS NEEDED
Status: CANCELLED
Start: 2019-11-06

## 2019-10-30 RX ORDER — HEPARIN 100 UNIT/ML
300-500 SYRINGE INTRAVENOUS AS NEEDED
Status: CANCELLED
Start: 2019-11-04

## 2019-10-30 RX ORDER — ONDANSETRON 2 MG/ML
8 INJECTION INTRAMUSCULAR; INTRAVENOUS ONCE
Status: CANCELLED | OUTPATIENT
Start: 2019-11-04

## 2019-10-30 RX ORDER — ONDANSETRON 2 MG/ML
8 INJECTION INTRAMUSCULAR; INTRAVENOUS AS NEEDED
Status: CANCELLED | OUTPATIENT
Start: 2019-11-04

## 2019-10-30 RX ORDER — ALBUTEROL SULFATE 0.83 MG/ML
2.5 SOLUTION RESPIRATORY (INHALATION) AS NEEDED
Status: CANCELLED
Start: 2019-11-04

## 2019-10-30 RX ORDER — ATROPINE SULFATE 0.4 MG/ML
0.4 INJECTION, SOLUTION ENDOTRACHEAL; INTRAMEDULLARY; INTRAMUSCULAR; INTRAVENOUS; SUBCUTANEOUS
Status: CANCELLED | OUTPATIENT
Start: 2019-11-04

## 2019-10-30 RX ORDER — SODIUM CHLORIDE 9 MG/ML
10 INJECTION INTRAMUSCULAR; INTRAVENOUS; SUBCUTANEOUS AS NEEDED
Status: CANCELLED | OUTPATIENT
Start: 2019-11-04

## 2019-10-30 RX ORDER — HYDROCORTISONE SODIUM SUCCINATE 100 MG/2ML
100 INJECTION, POWDER, FOR SOLUTION INTRAMUSCULAR; INTRAVENOUS AS NEEDED
Status: CANCELLED | OUTPATIENT
Start: 2019-11-04

## 2019-10-30 RX ORDER — FLUOROURACIL 50 MG/ML
400 INJECTION, SOLUTION INTRAVENOUS ONCE
Status: CANCELLED
Start: 2019-11-04

## 2019-10-30 RX ORDER — DEXTROSE MONOHYDRATE 50 MG/ML
25 INJECTION, SOLUTION INTRAVENOUS CONTINUOUS
Status: CANCELLED
Start: 2019-11-04

## 2019-10-30 RX ORDER — SODIUM CHLORIDE 9 MG/ML
10 INJECTION INTRAMUSCULAR; INTRAVENOUS; SUBCUTANEOUS AS NEEDED
Status: CANCELLED | OUTPATIENT
Start: 2019-11-06

## 2019-11-01 ENCOUNTER — TELEPHONE (OUTPATIENT)
Dept: ONCOLOGY | Age: 79
End: 2019-11-01

## 2019-11-01 RX ORDER — SODIUM CHLORIDE 0.9 % (FLUSH) 0.9 %
10 SYRINGE (ML) INJECTION AS NEEDED
Status: CANCELLED
Start: 2019-11-18

## 2019-11-01 RX ORDER — DIPHENHYDRAMINE HYDROCHLORIDE 50 MG/ML
50 INJECTION, SOLUTION INTRAMUSCULAR; INTRAVENOUS AS NEEDED
Status: CANCELLED
Start: 2019-11-18

## 2019-11-01 RX ORDER — HEPARIN 100 UNIT/ML
300-500 SYRINGE INTRAVENOUS AS NEEDED
Status: CANCELLED
Start: 2019-11-18

## 2019-11-01 RX ORDER — HYDROCORTISONE SODIUM SUCCINATE 100 MG/2ML
100 INJECTION, POWDER, FOR SOLUTION INTRAMUSCULAR; INTRAVENOUS AS NEEDED
Status: CANCELLED | OUTPATIENT
Start: 2019-11-18

## 2019-11-01 RX ORDER — HEPARIN 100 UNIT/ML
300-500 SYRINGE INTRAVENOUS AS NEEDED
Status: CANCELLED
Start: 2019-11-20

## 2019-11-01 RX ORDER — SODIUM CHLORIDE 0.9 % (FLUSH) 0.9 %
10 SYRINGE (ML) INJECTION AS NEEDED
Status: CANCELLED
Start: 2019-11-20

## 2019-11-01 RX ORDER — ONDANSETRON 2 MG/ML
8 INJECTION INTRAMUSCULAR; INTRAVENOUS AS NEEDED
Status: CANCELLED | OUTPATIENT
Start: 2019-11-18

## 2019-11-01 RX ORDER — ALBUTEROL SULFATE 0.83 MG/ML
2.5 SOLUTION RESPIRATORY (INHALATION) AS NEEDED
Status: CANCELLED
Start: 2019-11-18

## 2019-11-01 RX ORDER — DEXTROSE MONOHYDRATE 50 MG/ML
25 INJECTION, SOLUTION INTRAVENOUS CONTINUOUS
Status: CANCELLED
Start: 2019-11-18

## 2019-11-01 RX ORDER — ONDANSETRON 2 MG/ML
8 INJECTION INTRAMUSCULAR; INTRAVENOUS ONCE
Status: CANCELLED | OUTPATIENT
Start: 2019-11-18

## 2019-11-01 RX ORDER — ATROPINE SULFATE 0.4 MG/ML
0.4 INJECTION, SOLUTION ENDOTRACHEAL; INTRAMEDULLARY; INTRAMUSCULAR; INTRAVENOUS; SUBCUTANEOUS
Status: CANCELLED | OUTPATIENT
Start: 2019-11-18

## 2019-11-01 RX ORDER — FLUOROURACIL 50 MG/ML
300 INJECTION, SOLUTION INTRAVENOUS ONCE
Status: CANCELLED
Start: 2019-11-18 | End: 2019-11-18

## 2019-11-01 RX ORDER — ATROPINE SULFATE 0.4 MG/ML
0.4 INJECTION, SOLUTION ENDOTRACHEAL; INTRAMEDULLARY; INTRAMUSCULAR; INTRAVENOUS; SUBCUTANEOUS ONCE
Status: CANCELLED | OUTPATIENT
Start: 2019-11-18

## 2019-11-01 RX ORDER — SODIUM CHLORIDE 9 MG/ML
10 INJECTION INTRAMUSCULAR; INTRAVENOUS; SUBCUTANEOUS AS NEEDED
Status: CANCELLED | OUTPATIENT
Start: 2019-11-18

## 2019-11-01 RX ORDER — ACETAMINOPHEN 325 MG/1
650 TABLET ORAL AS NEEDED
Status: CANCELLED
Start: 2019-11-18

## 2019-11-01 RX ORDER — SODIUM CHLORIDE 9 MG/ML
10 INJECTION INTRAMUSCULAR; INTRAVENOUS; SUBCUTANEOUS AS NEEDED
Status: CANCELLED | OUTPATIENT
Start: 2019-11-20

## 2019-11-01 RX ORDER — FLUOROURACIL 50 MG/ML
300 INJECTION, SOLUTION INTRAVENOUS ONCE
Status: CANCELLED
Start: 2019-11-04 | End: 2019-11-04

## 2019-11-01 RX ORDER — EPINEPHRINE 1 MG/ML
0.3 INJECTION, SOLUTION, CONCENTRATE INTRAVENOUS AS NEEDED
Status: CANCELLED | OUTPATIENT
Start: 2019-11-18

## 2019-11-01 NOTE — TELEPHONE ENCOUNTER
Patient called, would like to discuss his neuropathy. Patients wants to know if he should come into the office today. Patient has PT @ 1:30 today. Please call back @ [phone].

## 2019-11-08 DIAGNOSIS — R11.0 CHEMOTHERAPY-INDUCED NAUSEA: ICD-10-CM

## 2019-11-08 DIAGNOSIS — T45.1X5A CHEMOTHERAPY-INDUCED NAUSEA: ICD-10-CM

## 2019-11-08 RX ORDER — ONDANSETRON 4 MG/1
4 TABLET, ORALLY DISINTEGRATING ORAL
Qty: 30 TAB | Refills: 2 | Status: SHIPPED | OUTPATIENT
Start: 2019-11-08

## 2019-11-08 RX ORDER — OXYCODONE AND ACETAMINOPHEN 5; 325 MG/1; MG/1
2 TABLET ORAL
Qty: 90 TAB | Refills: 0 | Status: SHIPPED | OUTPATIENT
Start: 2019-11-08 | End: 2019-11-22

## 2019-11-13 RX ORDER — SODIUM CHLORIDE 0.9 % (FLUSH) 0.9 %
10 SYRINGE (ML) INJECTION AS NEEDED
Status: CANCELLED
Start: 2019-12-02

## 2019-11-13 RX ORDER — ALBUTEROL SULFATE 0.83 MG/ML
2.5 SOLUTION RESPIRATORY (INHALATION) AS NEEDED
Status: CANCELLED
Start: 2019-12-02

## 2019-11-13 RX ORDER — ATROPINE SULFATE 0.4 MG/ML
0.4 INJECTION, SOLUTION ENDOTRACHEAL; INTRAMEDULLARY; INTRAMUSCULAR; INTRAVENOUS; SUBCUTANEOUS ONCE
Status: CANCELLED | OUTPATIENT
Start: 2019-12-02

## 2019-11-13 RX ORDER — SODIUM CHLORIDE 0.9 % (FLUSH) 0.9 %
10 SYRINGE (ML) INJECTION AS NEEDED
Status: CANCELLED
Start: 2019-12-04

## 2019-11-13 RX ORDER — HYDROCORTISONE SODIUM SUCCINATE 100 MG/2ML
100 INJECTION, POWDER, FOR SOLUTION INTRAMUSCULAR; INTRAVENOUS AS NEEDED
Status: CANCELLED | OUTPATIENT
Start: 2019-12-02

## 2019-11-13 RX ORDER — SODIUM CHLORIDE 9 MG/ML
10 INJECTION INTRAMUSCULAR; INTRAVENOUS; SUBCUTANEOUS AS NEEDED
Status: CANCELLED | OUTPATIENT
Start: 2019-12-02

## 2019-11-13 RX ORDER — DIPHENHYDRAMINE HYDROCHLORIDE 50 MG/ML
50 INJECTION, SOLUTION INTRAMUSCULAR; INTRAVENOUS AS NEEDED
Status: CANCELLED
Start: 2019-12-02

## 2019-11-13 RX ORDER — DEXTROSE MONOHYDRATE 50 MG/ML
25 INJECTION, SOLUTION INTRAVENOUS CONTINUOUS
Status: CANCELLED
Start: 2019-12-02

## 2019-11-13 RX ORDER — FLUOROURACIL 50 MG/ML
300 INJECTION, SOLUTION INTRAVENOUS ONCE
Status: CANCELLED
Start: 2019-12-02 | End: 2019-12-02

## 2019-11-13 RX ORDER — HEPARIN 100 UNIT/ML
300-500 SYRINGE INTRAVENOUS AS NEEDED
Status: CANCELLED
Start: 2019-12-04

## 2019-11-13 RX ORDER — ONDANSETRON 2 MG/ML
8 INJECTION INTRAMUSCULAR; INTRAVENOUS AS NEEDED
Status: CANCELLED | OUTPATIENT
Start: 2019-12-02

## 2019-11-13 RX ORDER — HEPARIN 100 UNIT/ML
300-500 SYRINGE INTRAVENOUS AS NEEDED
Status: CANCELLED
Start: 2019-12-02

## 2019-11-13 RX ORDER — ATROPINE SULFATE 0.4 MG/ML
0.4 INJECTION, SOLUTION ENDOTRACHEAL; INTRAMEDULLARY; INTRAMUSCULAR; INTRAVENOUS; SUBCUTANEOUS
Status: CANCELLED | OUTPATIENT
Start: 2019-12-02

## 2019-11-13 RX ORDER — EPINEPHRINE 1 MG/ML
0.3 INJECTION, SOLUTION, CONCENTRATE INTRAVENOUS AS NEEDED
Status: CANCELLED | OUTPATIENT
Start: 2019-12-02

## 2019-11-13 RX ORDER — ACETAMINOPHEN 325 MG/1
650 TABLET ORAL AS NEEDED
Status: CANCELLED
Start: 2019-12-02

## 2019-11-13 RX ORDER — SODIUM CHLORIDE 9 MG/ML
10 INJECTION INTRAMUSCULAR; INTRAVENOUS; SUBCUTANEOUS AS NEEDED
Status: CANCELLED | OUTPATIENT
Start: 2019-12-04

## 2019-11-13 RX ORDER — ONDANSETRON 2 MG/ML
8 INJECTION INTRAMUSCULAR; INTRAVENOUS ONCE
Status: CANCELLED | OUTPATIENT
Start: 2019-12-02

## 2019-11-18 ENCOUNTER — TELEPHONE (OUTPATIENT)
Dept: ONCOLOGY | Age: 79
End: 2019-11-18

## 2019-11-20 ENCOUNTER — OFFICE VISIT (OUTPATIENT)
Dept: ONCOLOGY | Age: 79
End: 2019-11-20

## 2019-11-20 VITALS
WEIGHT: 178 LBS | TEMPERATURE: 98.2 F | RESPIRATION RATE: 18 BRPM | HEART RATE: 74 BPM | OXYGEN SATURATION: 95 % | HEIGHT: 72 IN | SYSTOLIC BLOOD PRESSURE: 115 MMHG | BODY MASS INDEX: 24.11 KG/M2 | DIASTOLIC BLOOD PRESSURE: 71 MMHG

## 2019-11-20 DIAGNOSIS — G89.3 NEOPLASM RELATED PAIN: ICD-10-CM

## 2019-11-20 DIAGNOSIS — C25.0 MALIGNANT NEOPLASM OF HEAD OF PANCREAS (HCC): Primary | ICD-10-CM

## 2019-11-20 RX ORDER — FENTANYL 25 UG/1
1 PATCH TRANSDERMAL
Qty: 10 PATCH | Refills: 0 | Status: SHIPPED | OUTPATIENT
Start: 2019-11-20 | End: 2019-12-20

## 2019-11-20 NOTE — PROGRESS NOTES
Reason for Visit:   Natalie Mckeon a 77 y. o. male who is seen for follow up pancreatic cancer     Treatment History:   Dx: Metastatic Pancreatic Adenocarcinoma--Nov 2018--peritoneal mets  Tx: FOLFIRINOX--first cycle 2/4/19, second cycle 2/18/19, third cycle 3/4/19, fourth cycle on 3/18/19, 5th on 4/1/19, 6th 4/15/2019, 7th 4/29/19, 8th 5/13/19, 9th 6/5/19 (dose reduced due to toxicity), 10th 6/24/19, 11th 7/5/19, 12th 7/22/2019, 13th 8/5/2019, 14th 8/19/2019, 15th 8/9/2019, 16th 9/3/2019, 17th 9/23/2019, 18th 10/7/2019, 19 10/21/2019, 20 11/4/2019, 21 11/18/2019.    Goal: prolong survival, Cycle length: until progression, Will return to see me prior to each cycle    History of Present Illness:   Still with problems with neuropathy--long discussion    Past Medical History:   Diagnosis Date    CAD (coronary artery disease)     3 cardiac stents   heart Dr. Lisa Almaguer Cervical spondylosis without myelopathy 2009    Chronic pain     shoulder left, upper lumbar    Depressive disorder, not elsewhere classified     Diaphragmatic hernia without mention of obstruction or gangrene     GERD (gastroesophageal reflux disease)     Hypercholesterolemia     Hypertension     Hypertrophy of prostate without urinary obstruction and other lower urinary tract symptoms (LUTS) 2008    Insomnia, unspecified 2009    Lumbosacral spondylosis without myelopathy 2009    Osteoarthrosis involving, or with mention of more than one site, but not specified as generalized, multiple sites 2010    Varicose veins 12/11/2014      Past Surgical History:   Procedure Laterality Date    CABG, ARTERY-VEIN, TWO  1/9/12    CABG    HX APPENDECTOMY  1962    HX CATARACT REMOVAL  2015    bilateral    HX COLONOSCOPY  2010    x2 small beign polyps    HX COLONOSCOPY  2017    hyperplastic polyp    HX ENDOSCOPY  2010    acid reflux    HX HEART CATHETERIZATION  1998, 2001    3 stents    HX HEENT  1962    fx nose repair    HX HERNIA REPAIR  early     left inguinal, with mesh    HX ORTHOPAEDIC  2017    lumbar fusion    HX OTHER SURGICAL  7 yrs. old    Hiatal Hernia    HX TONSILLECTOMY      HX VASCULAR ACCESS      IR INSERT TUNL CVAD W PORT LESS THAN 5 YR  2019      Social History     Tobacco Use    Smoking status: Former Smoker     Packs/day: 1.00     Years: 10.00     Pack years: 10.00     Types: Cigarettes     Start date: 1961     Last attempt to quit: 1971     Years since quittin.9    Smokeless tobacco: Never Used   Substance Use Topics    Alcohol use: No     Frequency: Never     Binge frequency: Never      Family History   Problem Relation Age of Onset    Hypertension Father     Heart Disease Father     Arthritis-osteo Mother     Cancer Brother 78        tumor in back    Heart Disease Brother     Diabetes Maternal Grandfather     Kidney Disease Maternal Grandfather      Current Outpatient Medications   Medication Sig    oxyCODONE-acetaminophen (PERCOCET) 5-325 mg per tablet Take 2 Tabs by mouth every four (4) hours as needed for Pain for up to 14 days. Max Daily Amount: 12 Tabs.  ondansetron (ZOFRAN ODT) 4 mg disintegrating tablet Take 1 Tab by mouth every eight (8) hours as needed for Nausea. Indications: Nausea and Vomiting caused by Cancer Drugs    dexAMETHasone (DECADRON) 4 mg tablet Take 8 mg by mouth daily. Indications: Prevent Nausea and Vomiting from Cancer Chemotherapy    DULoxetine (CYMBALTA) 60 mg capsule Take 1 Cap by mouth daily. Indications: Chronic Muscle or Bone Pain    rosuvastatin (CRESTOR) 10 mg tablet Take 1 Tab by mouth nightly. Indications: excessive fat in the blood    rivaroxaban (XARELTO) 20 mg tab tablet Take 1 Tab by mouth daily (with breakfast). Indications: a clot in the lung    gabapentin (NEURONTIN) 300 mg capsule Take 1 Cap by mouth three (3) times daily. Max Daily Amount: 900 mg.  Indications: Neuropathic Pain    tamsulosin (FLOMAX) 0.4 mg capsule TAKE 2 CAPSULE BY MOUTH NIGHTLY.  acetaminophen (TYLENOL EXTRA STRENGTH) 500 mg tablet Take 500 mg by mouth every six (6) hours as needed for Pain.  albuterol (VENTOLIN HFA) 90 mcg/actuation inhaler Take 2 Puffs by inhalation every four (4) hours as needed for Wheezing.  LORazepam (ATIVAN) 0.5 mg tablet Take 1 Tab by mouth every four (4) hours as needed for Anxiety. Max Daily Amount: 3 mg. Indications: Nausea and Vomiting caused by Cancer Drugs    omeprazole (PRILOSEC) 20 mg capsule Take 20 mg by mouth daily.  polyethylene glycol (MIRALAX) 17 gram/dose powder Take 17 g by mouth daily.  amLODIPine (NORVASC) 5 mg tablet Take 5 mg by mouth daily.  METOPROLOL TARTRATE PO Take 12.5 mg by mouth two (2) times a day.  multivitamin (ONE A DAY) tablet Take 1 Tab by mouth daily.  aspirin 81 mg tablet Take 81 mg by mouth.  dutasteride (AVODART) 0.5 mg capsule Take 0.5 mg by mouth daily. No current facility-administered medications for this visit. Facility-Administered Medications Ordered in Other Visits   Medication Dose Route Frequency    saline peripheral flush soln 10 mL  10 mL InterCATHeter PRN    heparin (porcine) pf 300-500 Units  300-500 Units InterCATHeter PRN      No Known Allergies     Review of Systems: A complete review of systems was obtained, negative except as described above. Physical Exam:   There were no vitals taken for this visit. ECOG PS: 0  General: No distress  Eyes: PERRLA, anicteric sclerae  HENT: Atraumatic, OP clear  Neck: Supple  Lymphatic: No cervical, supraclavicular, or inguinal adenopathy  Respiratory: CTAB, normal respiratory effort  CV: Normal rate, regular rhythm, no murmurs, no peripheral edema  GI: Soft, nontender, nondistended, no masses, no hepatomegaly, no splenomegaly  MS: Normal gait and station. Digits without clubbing or cyanosis. Skin: No rashes, ecchymoses, or petechiae. Normal temperature, turgor, and texture.   Psych: Alert, oriented, appropriate affect, normal judgment/insight    Results:     Lab Results   Component Value Date/Time    WBC 2.4 (L) 11/18/2019 08:45 AM    HGB 10.1 (L) 11/18/2019 08:45 AM    HCT 30.5 (L) 11/18/2019 08:45 AM    PLATELET 193 77/54/5007 08:45 AM    .4 (H) 11/18/2019 08:45 AM    ABS. NEUTROPHILS 1.2 (L) 11/18/2019 08:45 AM     Lab Results   Component Value Date/Time    Sodium 143 11/18/2019 08:45 AM    Potassium 4.2 11/18/2019 08:45 AM    Chloride 107 11/18/2019 08:45 AM    CO2 27 11/18/2019 08:45 AM    Glucose 115 (H) 11/18/2019 08:45 AM    BUN 14 11/18/2019 08:45 AM    Creatinine 0.93 11/18/2019 08:45 AM    GFR est AA >60 11/18/2019 08:45 AM    GFR est non-AA >60 11/18/2019 08:45 AM    Calcium 8.5 11/18/2019 08:45 AM    Glucose (POC) 112 (H) 01/12/2012 06:32 AM     Lab Results   Component Value Date/Time    Bilirubin, total 0.2 11/18/2019 08:45 AM    ALT (SGPT) 20 11/18/2019 08:45 AM    AST (SGOT) 21 11/18/2019 08:45 AM    Alk. phosphatase 55 11/18/2019 08:45 AM    Protein, total 6.0 (L) 11/18/2019 08:45 AM    Albumin 3.2 (L) 11/18/2019 08:45 AM    Globulin 2.8 11/18/2019 08:45 AM       CT C/A/P 4/5/2019  Resolved pulmonary emboli possible persistent right femoral vein DVT. Positive carcinomatosis with interval decrease in amount of ascites. Incidental cholelithiasis, lower lumbar fusion and hepatic cysts     CT C/A/P 6/21/2019  1. Mild interval decrease in size of pancreatic head/neck mass. 2. Stable peritoneal carcinomatosis. New pocket of loculated ascites underneath  the right hemidiaphragm measuring 3.3 x 2.7 x 2.3 cm.  3. No evidence of metastatic disease in the chest.  4. Cholelithiasis      CT C/A/P 10/11/2019  IMPRESSION:  1. Further decrease in the size of the previously described small pancreatic  head mass lesion. 2. Continued evidence of focal nodular lesions in the anterior aspect of the  peritoneum compatible with carcinomatosis. No evidence of para-aortic  lymphadenopathy.   3. No focal intrathoracic nodules/mass lesions identified. No evidence of  mediastinal or hilar lymphadenopathy. 4. Persistence of the previously described focal, loculated collection of  ascites in the region of the right hemidiaphragm. 5. Normal sized liver. Persistence of the previously described multiple focal  low-density areas within liver suggestive of cysts. 6. Normal sized spleen. No focal splenic lesions identified. 7. Normal appearance of the kidneys. 8. Suboptimal distention of the urinary bladder (see discussion above) disease. 9. Relative enlargement of the prostate gland.     Assessment:   1) Metastatic Pancreatic Carcinoma with Peritoneal Metastasis  2) Neoplasm Pain  3) PE     Plan:   1) Continue with dose reduced FOLFIRINOX-Repeat Imaging shows its decreasing in size so its correlating with his CA 19-9 which continues to drop.  Will contact us if things change.   Will plan on seeing him in 1 month.    2) Pain is not well controlled.  Will continue  Fentanyl 25mcg patch every 3 days, continue oxycodone/apap prn.  Stop the nortriptyline and gabapentin.  Continue Cymbalta to 60mg daily--he wasn't sure if he was taking. Will contact [place] for thoughts of Neuropathy--? Another dose reduction?   3) Continue Xarelto 20mg daily         Signed By: Betty Love MD

## 2019-11-20 NOTE — PROGRESS NOTES
Pt is here for routine follow up, he had his 5FU pump removed today, has been feeling nauseated but using Zofran with relief. Pt also reports numbness and tingling in fingers. Visit Vitals  /71   Pulse 74   Temp 98.2 °F (36.8 °C)   Resp 18   Ht 6' (1.829 m)   Wt 178 lb (80.7 kg)   SpO2 95%   BMI 24.14 kg/m²       Pain Scale: 0 - No pain/10  Pain Location:       1. Have you been to the ER, urgent care clinic since your last visit? Hospitalized since your last visit? no    2. Have you seen or consulted any other health care providers outside of the [address] [place] [address] since your last visit? Include any pap smears or colon screening.   No    Health Maintenance reviewed

## 2019-11-22 ENCOUNTER — TELEPHONE (OUTPATIENT)
Dept: ONCOLOGY | Age: 79
End: 2019-11-22

## 2019-11-22 NOTE — TELEPHONE ENCOUNTER
Pt called asking if he can try to restart Gabapentin for peripheral neuropathy, he reports he has some at home but just wanted to make sure it was ok to restart. After speaking with Dr Kayla Hargrove, Pt was advised OK to retry Gabapentin at 100mg, 1 tab 3 times a day. Pt instructed to call if any further questions.

## 2019-12-02 DIAGNOSIS — C25.0 MALIGNANT NEOPLASM OF HEAD OF PANCREAS (HCC): ICD-10-CM

## 2019-12-04 DIAGNOSIS — G89.3 NEOPLASM RELATED PAIN: Primary | ICD-10-CM

## 2019-12-04 RX ORDER — GABAPENTIN 300 MG/1
300 CAPSULE ORAL 3 TIMES DAILY
Qty: 90 CAP | Refills: 1 | Status: SHIPPED | OUTPATIENT
Start: 2019-12-04 | End: 2020-02-19

## 2019-12-04 RX ORDER — OXYCODONE AND ACETAMINOPHEN 5; 325 MG/1; MG/1
2 TABLET ORAL
Qty: 90 TAB | Refills: 0 | Status: SHIPPED | OUTPATIENT
Start: 2019-12-04 | End: 2019-12-11

## 2019-12-11 RX ORDER — DIPHENHYDRAMINE HYDROCHLORIDE 50 MG/ML
50 INJECTION, SOLUTION INTRAMUSCULAR; INTRAVENOUS AS NEEDED
Status: CANCELLED
Start: 2019-12-16

## 2019-12-11 RX ORDER — FLUOROURACIL 50 MG/ML
300 INJECTION, SOLUTION INTRAVENOUS ONCE
Status: CANCELLED
Start: 2019-12-16 | End: 2019-12-16

## 2019-12-11 RX ORDER — ACETAMINOPHEN 325 MG/1
650 TABLET ORAL AS NEEDED
Status: CANCELLED
Start: 2019-12-16

## 2019-12-11 RX ORDER — HYDROCORTISONE SODIUM SUCCINATE 100 MG/2ML
100 INJECTION, POWDER, FOR SOLUTION INTRAMUSCULAR; INTRAVENOUS AS NEEDED
Status: CANCELLED | OUTPATIENT
Start: 2019-12-16

## 2019-12-11 RX ORDER — SODIUM CHLORIDE 9 MG/ML
10 INJECTION INTRAMUSCULAR; INTRAVENOUS; SUBCUTANEOUS AS NEEDED
Status: CANCELLED | OUTPATIENT
Start: 2019-12-18

## 2019-12-11 RX ORDER — HEPARIN 100 UNIT/ML
300-500 SYRINGE INTRAVENOUS AS NEEDED
Status: CANCELLED
Start: 2019-12-18

## 2019-12-11 RX ORDER — EPINEPHRINE 1 MG/ML
0.3 INJECTION, SOLUTION, CONCENTRATE INTRAVENOUS AS NEEDED
Status: CANCELLED | OUTPATIENT
Start: 2019-12-16

## 2019-12-11 RX ORDER — SODIUM CHLORIDE 0.9 % (FLUSH) 0.9 %
10 SYRINGE (ML) INJECTION AS NEEDED
Status: CANCELLED
Start: 2019-12-18

## 2019-12-11 RX ORDER — SODIUM CHLORIDE 0.9 % (FLUSH) 0.9 %
10 SYRINGE (ML) INJECTION AS NEEDED
Status: CANCELLED
Start: 2019-12-16

## 2019-12-11 RX ORDER — ALBUTEROL SULFATE 0.83 MG/ML
2.5 SOLUTION RESPIRATORY (INHALATION) AS NEEDED
Status: CANCELLED
Start: 2019-12-16

## 2019-12-11 RX ORDER — HEPARIN 100 UNIT/ML
300-500 SYRINGE INTRAVENOUS AS NEEDED
Status: CANCELLED
Start: 2019-12-16

## 2019-12-11 RX ORDER — ATROPINE SULFATE 0.4 MG/ML
0.4 INJECTION, SOLUTION ENDOTRACHEAL; INTRAMEDULLARY; INTRAMUSCULAR; INTRAVENOUS; SUBCUTANEOUS
Status: CANCELLED | OUTPATIENT
Start: 2019-12-16

## 2019-12-11 RX ORDER — ONDANSETRON 2 MG/ML
8 INJECTION INTRAMUSCULAR; INTRAVENOUS ONCE
Status: CANCELLED | OUTPATIENT
Start: 2019-12-16

## 2019-12-11 RX ORDER — ATROPINE SULFATE 0.4 MG/ML
0.4 INJECTION, SOLUTION ENDOTRACHEAL; INTRAMEDULLARY; INTRAMUSCULAR; INTRAVENOUS; SUBCUTANEOUS ONCE
Status: CANCELLED | OUTPATIENT
Start: 2019-12-16

## 2019-12-11 RX ORDER — SODIUM CHLORIDE 9 MG/ML
10 INJECTION INTRAMUSCULAR; INTRAVENOUS; SUBCUTANEOUS AS NEEDED
Status: CANCELLED | OUTPATIENT
Start: 2019-12-16

## 2019-12-11 RX ORDER — ONDANSETRON 2 MG/ML
8 INJECTION INTRAMUSCULAR; INTRAVENOUS AS NEEDED
Status: CANCELLED | OUTPATIENT
Start: 2019-12-16

## 2019-12-11 RX ORDER — DEXTROSE MONOHYDRATE 50 MG/ML
25 INJECTION, SOLUTION INTRAVENOUS CONTINUOUS
Status: CANCELLED
Start: 2019-12-16

## 2019-12-18 ENCOUNTER — OFFICE VISIT (OUTPATIENT)
Dept: ONCOLOGY | Age: 79
End: 2019-12-18

## 2019-12-18 VITALS
HEART RATE: 91 BPM | WEIGHT: 178 LBS | DIASTOLIC BLOOD PRESSURE: 70 MMHG | BODY MASS INDEX: 24.11 KG/M2 | RESPIRATION RATE: 18 BRPM | OXYGEN SATURATION: 91 % | TEMPERATURE: 96 F | HEIGHT: 72 IN | SYSTOLIC BLOOD PRESSURE: 135 MMHG

## 2019-12-18 DIAGNOSIS — C25.0 MALIGNANT NEOPLASM OF HEAD OF PANCREAS (HCC): Primary | ICD-10-CM

## 2019-12-18 NOTE — PROGRESS NOTES
Reason for Visit:   Court Wylie a 77 y. o. male who is seen for follow up pancreatic cancer     Treatment History:   Dx: Metastatic Pancreatic Adenocarcinoma--Nov 2018--peritoneal mets  Tx: FOLFIRINOX--first cycle 2/4/19, second cycle 2/18/19, third cycle 3/4/19, fourth cycle on 3/18/19, 5th on 4/1/19, 6th 4/15/2019, 7th 4/29/19, 8th 5/13/19, 9th 6/5/19 (dose reduced due to toxicity), 10th 6/24/19, 11th 7/5/19, 12th 7/22/2019, 13th 8/5/2019, 14th 8/19/2019, 15th 8/9/2019, 16th 9/3/2019, 17th 9/23/2019, 18th 10/7/2019, 19 10/21/2019, 20 11/4/2019, 21 11/18/2019, 22 12/2/2019 (stopped the [address] [place]), 23 12/16/2019 (no [place])  Goal: prolong survival, Cycle length: until progression, Will return to see me prior to each cycle    History of Present Illness:   Neuropathy isn't worse--going to [facility] for possible [place]. No problems otherwise. Feeling better from energy standpoint--restarted exercise.     Past Medical History:   Diagnosis Date    CAD (coronary artery disease)     3 cardiac stents   heart Dr. Jair Burden Cervical spondylosis without myelopathy 2009    Chronic pain     shoulder left, upper lumbar    Depressive disorder, not elsewhere classified     Diaphragmatic hernia without mention of obstruction or gangrene     GERD (gastroesophageal reflux disease)     Hypercholesterolemia     Hypertension     Hypertrophy of prostate without urinary obstruction and other lower urinary tract symptoms (LUTS) 2008    Insomnia, unspecified 2009    Lumbosacral spondylosis without myelopathy 2009    Osteoarthrosis involving, or with mention of more than one site, but not specified as generalized, multiple sites 2010    Varicose veins 12/11/2014      Past Surgical History:   Procedure Laterality Date    CABG, ARTERY-VEIN, TWO  1/9/12    CABG    HX APPENDECTOMY  1962    HX CATARACT REMOVAL  2015    bilateral    HX COLONOSCOPY  2010    x2 small beign polyps    HX COLONOSCOPY  2017 hyperplastic polyp    HX ENDOSCOPY  2010    acid reflux    HX HEART CATHETERIZATION  ,     3 stents    HX HEENT      fx nose repair    HX HERNIA REPAIR  early     left inguinal, with mesh    HX ORTHOPAEDIC  2017    lumbar fusion    HX OTHER SURGICAL  7 yrs. old    Hiatal Hernia    HX TONSILLECTOMY      HX VASCULAR ACCESS      IR INSERT TUNL CVAD W PORT LESS THAN 5 YR  2019      Social History     Tobacco Use    Smoking status: Former Smoker     Packs/day: 1.00     Years: 10.00     Pack years: 10.00     Types: Cigarettes     Start date: 1961     Last attempt to quit: 1971     Years since quittin.9    Smokeless tobacco: Never Used   Substance Use Topics    Alcohol use: No     Frequency: Never     Binge frequency: Never      Family History   Problem Relation Age of Onset    Hypertension Father     Heart Disease Father     Arthritis-osteo Mother     Cancer Brother 78        tumor in back    Heart Disease Brother     Diabetes Maternal Grandfather     Kidney Disease Maternal Grandfather      Current Outpatient Medications   Medication Sig    oxyCODONE IR (ROXICODONE) 5 mg immediate release tablet Take 5 mg by mouth every four (4) hours as needed for Pain.  gabapentin (NEURONTIN) 300 mg capsule Take 1 Cap by mouth three (3) times daily. Max Daily Amount: 900 mg. Indications: Neuropathic Pain    fentaNYL (DURAGESIC) 25 mcg/hr PATCH 1 Patch by TransDERmal route every seventy-two (72) hours for 30 days. Max Daily Amount: 1 Patch.  ondansetron (ZOFRAN ODT) 4 mg disintegrating tablet Take 1 Tab by mouth every eight (8) hours as needed for Nausea. Indications: Nausea and Vomiting caused by Cancer Drugs    dexAMETHasone (DECADRON) 4 mg tablet Take 8 mg by mouth daily. Indications: Prevent Nausea and Vomiting from Cancer Chemotherapy    DULoxetine (CYMBALTA) 60 mg capsule Take 1 Cap by mouth daily.  Indications: Chronic Muscle or Bone Pain    rosuvastatin (CRESTOR) 10 mg tablet Take 1 Tab by mouth nightly. Indications: excessive fat in the blood    rivaroxaban (XARELTO) 20 mg tab tablet Take 1 Tab by mouth daily (with breakfast). Indications: a clot in the lung    tamsulosin (FLOMAX) 0.4 mg capsule TAKE 2 CAPSULE BY MOUTH NIGHTLY.  acetaminophen (TYLENOL EXTRA STRENGTH) 500 mg tablet Take 500 mg by mouth every six (6) hours as needed for Pain.  albuterol (VENTOLIN HFA) 90 mcg/actuation inhaler Take 2 Puffs by inhalation every four (4) hours as needed for Wheezing.  LORazepam (ATIVAN) 0.5 mg tablet Take 1 Tab by mouth every four (4) hours as needed for Anxiety. Max Daily Amount: 3 mg. Indications: Nausea and Vomiting caused by Cancer Drugs    omeprazole (PRILOSEC) 20 mg capsule Take 20 mg by mouth daily.  polyethylene glycol (MIRALAX) 17 gram/dose powder Take 17 g by mouth daily.  amLODIPine (NORVASC) 5 mg tablet Take 5 mg by mouth daily.  METOPROLOL TARTRATE PO Take 12.5 mg by mouth two (2) times a day.  multivitamin (ONE A DAY) tablet Take 1 Tab by mouth daily.  aspirin 81 mg tablet Take 81 mg by mouth.  dutasteride (AVODART) 0.5 mg capsule Take 0.5 mg by mouth daily. No current facility-administered medications for this visit. No Known Allergies     Review of Systems: A complete review of systems was obtained, negative except as described above. Physical Exam:     Visit Vitals  /70   Pulse 91   Temp 96 °F (35.6 °C)   Resp 18   Ht 6' (1.829 m)   Wt 178 lb (80.7 kg)   SpO2 91%   BMI 24.14 kg/m²     ECOG PS: 1  General: No distress  Eyes: PERRLA, anicteric sclerae  HENT: Atraumatic, OP clear  Neck: Supple  Lymphatic: No cervical, supraclavicular, or inguinal adenopathy  Respiratory: CTAB, normal respiratory effort  CV: Normal rate, regular rhythm, no murmurs, no peripheral edema  GI: Soft, nontender, nondistended, no masses, no hepatomegaly, no splenomegaly  MS: Normal gait and station. Digits without clubbing or cyanosis.   Skin: No rashes, ecchymoses, or petechiae. Normal temperature, turgor, and texture. Psych: Alert, oriented, appropriate affect, normal judgment/insight    Results:     Lab Results   Component Value Date/Time    WBC 3.3 (L) 12/16/2019 08:45 AM    HGB 10.2 (L) 12/16/2019 08:45 AM    HCT 30.5 (L) 12/16/2019 08:45 AM    PLATELET 130 (L) 54/83/0068 08:45 AM    .9 (H) 12/16/2019 08:45 AM    ABS. NEUTROPHILS 1.9 12/16/2019 08:45 AM     Lab Results   Component Value Date/Time    Sodium 142 12/16/2019 08:45 AM    Potassium 4.0 12/16/2019 08:45 AM    Chloride 106 12/16/2019 08:45 AM    CO2 26 12/16/2019 08:45 AM    Glucose 131 (H) 12/16/2019 08:45 AM    BUN 14 12/16/2019 08:45 AM    Creatinine 0.97 12/16/2019 08:45 AM    GFR est AA >60 12/16/2019 08:45 AM    GFR est non-AA >60 12/16/2019 08:45 AM    Calcium 8.8 12/16/2019 08:45 AM    Glucose (POC) 112 (H) 01/12/2012 06:32 AM     Lab Results   Component Value Date/Time    Bilirubin, total 0.3 12/16/2019 08:45 AM    ALT (SGPT) 21 12/16/2019 08:45 AM    AST (SGOT) 20 12/16/2019 08:45 AM    Alk. phosphatase 59 12/16/2019 08:45 AM    Protein, total 5.9 (L) 12/16/2019 08:45 AM    Albumin 3.3 (L) 12/16/2019 08:45 AM    Globulin 2.6 12/16/2019 08:45 AM       CT C/A/P 4/5/2019  Resolved pulmonary emboli possible persistent right femoral vein DVT. Positive carcinomatosis with interval decrease in amount of ascites. Incidental cholelithiasis, lower lumbar fusion and hepatic cysts     CT C/A/P 6/21/2019  1. Mild interval decrease in size of pancreatic head/neck mass. 2. Stable peritoneal carcinomatosis. New pocket of loculated ascites underneath  the right hemidiaphragm measuring 3.3 x 2.7 x 2.3 cm.  3. No evidence of metastatic disease in the chest.  4. Cholelithiasis      CT C/A/P 10/11/2019  IMPRESSION:  1. Further decrease in the size of the previously described small pancreatic  head mass lesion.   2. Continued evidence of focal nodular lesions in the anterior aspect of the  peritoneum compatible with carcinomatosis. No evidence of para-aortic  lymphadenopathy. 3. No focal intrathoracic nodules/mass lesions identified. No evidence of  mediastinal or hilar lymphadenopathy. 4. Persistence of the previously described focal, loculated collection of  ascites in the region of the right hemidiaphragm. 5. Normal sized liver. Persistence of the previously described multiple focal  low-density areas within liver suggestive of cysts. 6. Normal sized spleen. No focal splenic lesions identified. 7. Normal appearance of the kidneys. 8. Suboptimal distention of the urinary bladder (see discussion above) disease. 9. Relative enlargement of the prostate gland.     Assessment:   1) Metastatic Pancreatic Carcinoma with Peritoneal Metastasis  2) Neoplasm Pain  3) PE     Plan:   1) Continue with dose reduced FOLFIRINOX-Repeat Imaging shows its decreasing in size so its correlating with his CA 19-9 which continues to drop.  We have stopped the Oxaliplatin due to his worsening neuropathy--Will plan on seeing him in 1 month.    2) Pain is somewhat controlled.  Will continue  Fentanyl 25mcg patch every 3 days, continue oxycodone/apap prn.  Stop the nortriptyline and gabapentin.  Continue Cymbalta to 60mg daily--he wasn't sure if he was taking. On wait list to see Dr Nora Matthews.   3) Continue Xarelto 20mg daily         Signed By: Arthur Fragoso MD

## 2019-12-18 NOTE — PROGRESS NOTES
Pt is here for routine follow up, he reports feeling well, some soreness in his mouth but reports salt water rinse helps. Visit Vitals  /70   Pulse 91   Temp 96 °F (35.6 °C)   Resp 18   Ht 6' (1.829 m)   Wt 178 lb (80.7 kg)   SpO2 91%   BMI 24.14 kg/m²       Pain Scale: 0 - No pain/10  Pain Location:     1. Have you been to the ER, urgent care clinic since your last visit? Hospitalized since your last visit? no    2. Have you seen or consulted any other health care providers outside of the [address] [place] [address] since your last visit? Include any pap smears or colon screening.   No    Health Maintenance reviewed

## 2019-12-19 RX ORDER — DIPHENHYDRAMINE HYDROCHLORIDE 50 MG/ML
50 INJECTION, SOLUTION INTRAMUSCULAR; INTRAVENOUS AS NEEDED
Status: CANCELLED
Start: 2020-01-06

## 2019-12-19 RX ORDER — DEXTROSE MONOHYDRATE 50 MG/ML
25 INJECTION, SOLUTION INTRAVENOUS CONTINUOUS
Status: CANCELLED | OUTPATIENT
Start: 2020-01-20

## 2019-12-19 RX ORDER — ACETAMINOPHEN 325 MG/1
650 TABLET ORAL AS NEEDED
Status: CANCELLED | OUTPATIENT
Start: 2020-01-20

## 2019-12-19 RX ORDER — HEPARIN 100 UNIT/ML
300-500 SYRINGE INTRAVENOUS AS NEEDED
Status: CANCELLED | OUTPATIENT
Start: 2020-01-20

## 2019-12-19 RX ORDER — SODIUM CHLORIDE 0.9 % (FLUSH) 0.9 %
10 SYRINGE (ML) INJECTION AS NEEDED
Status: CANCELLED
Start: 2020-01-08

## 2019-12-19 RX ORDER — SODIUM CHLORIDE 0.9 % (FLUSH) 0.9 %
10 SYRINGE (ML) INJECTION AS NEEDED
Status: CANCELLED | OUTPATIENT
Start: 2020-01-20

## 2019-12-19 RX ORDER — ONDANSETRON 2 MG/ML
8 INJECTION INTRAMUSCULAR; INTRAVENOUS AS NEEDED
Status: CANCELLED | OUTPATIENT
Start: 2020-01-20

## 2019-12-19 RX ORDER — SODIUM CHLORIDE 0.9 % (FLUSH) 0.9 %
10 SYRINGE (ML) INJECTION AS NEEDED
Status: CANCELLED | OUTPATIENT
Start: 2020-01-06

## 2019-12-19 RX ORDER — HYDROCORTISONE SODIUM SUCCINATE 100 MG/2ML
100 INJECTION, POWDER, FOR SOLUTION INTRAMUSCULAR; INTRAVENOUS AS NEEDED
Status: CANCELLED | OUTPATIENT
Start: 2020-02-03

## 2019-12-19 RX ORDER — ONDANSETRON 2 MG/ML
8 INJECTION INTRAMUSCULAR; INTRAVENOUS ONCE
Status: CANCELLED | OUTPATIENT
Start: 2020-01-06

## 2019-12-19 RX ORDER — DIPHENHYDRAMINE HYDROCHLORIDE 50 MG/ML
50 INJECTION, SOLUTION INTRAMUSCULAR; INTRAVENOUS AS NEEDED
Status: CANCELLED
Start: 2020-01-20

## 2019-12-19 RX ORDER — HEPARIN 100 UNIT/ML
300-500 SYRINGE INTRAVENOUS AS NEEDED
Status: CANCELLED | OUTPATIENT
Start: 2020-01-06

## 2019-12-19 RX ORDER — SODIUM CHLORIDE 9 MG/ML
10 INJECTION INTRAMUSCULAR; INTRAVENOUS; SUBCUTANEOUS AS NEEDED
Status: CANCELLED | OUTPATIENT
Start: 2020-01-08

## 2019-12-19 RX ORDER — ACETAMINOPHEN 325 MG/1
650 TABLET ORAL AS NEEDED
Status: CANCELLED | OUTPATIENT
Start: 2020-02-03

## 2019-12-19 RX ORDER — ATROPINE SULFATE 0.4 MG/ML
0.4 INJECTION, SOLUTION ENDOTRACHEAL; INTRAMEDULLARY; INTRAMUSCULAR; INTRAVENOUS; SUBCUTANEOUS ONCE
Status: CANCELLED | OUTPATIENT
Start: 2020-01-06

## 2019-12-19 RX ORDER — ALBUTEROL SULFATE 0.83 MG/ML
2.5 SOLUTION RESPIRATORY (INHALATION) AS NEEDED
Status: CANCELLED
Start: 2020-01-06

## 2019-12-19 RX ORDER — ONDANSETRON 2 MG/ML
8 INJECTION INTRAMUSCULAR; INTRAVENOUS AS NEEDED
Status: CANCELLED | OUTPATIENT
Start: 2020-01-06

## 2019-12-19 RX ORDER — ATROPINE SULFATE 0.4 MG/ML
0.4 INJECTION, SOLUTION ENDOTRACHEAL; INTRAMEDULLARY; INTRAMUSCULAR; INTRAVENOUS; SUBCUTANEOUS
Status: CANCELLED | OUTPATIENT
Start: 2020-02-03

## 2019-12-19 RX ORDER — FLUOROURACIL 50 MG/ML
300 INJECTION, SOLUTION INTRAVENOUS ONCE
Status: CANCELLED | OUTPATIENT
Start: 2020-02-03 | End: 2020-02-03

## 2019-12-19 RX ORDER — DIPHENHYDRAMINE HYDROCHLORIDE 50 MG/ML
50 INJECTION, SOLUTION INTRAMUSCULAR; INTRAVENOUS AS NEEDED
Status: CANCELLED | OUTPATIENT
Start: 2020-01-20

## 2019-12-19 RX ORDER — HEPARIN 100 UNIT/ML
300-500 SYRINGE INTRAVENOUS AS NEEDED
Status: CANCELLED | OUTPATIENT
Start: 2020-01-08

## 2019-12-19 RX ORDER — EPINEPHRINE 1 MG/ML
0.3 INJECTION, SOLUTION, CONCENTRATE INTRAVENOUS AS NEEDED
Status: CANCELLED | OUTPATIENT
Start: 2020-02-03

## 2019-12-19 RX ORDER — DIPHENHYDRAMINE HYDROCHLORIDE 50 MG/ML
50 INJECTION, SOLUTION INTRAMUSCULAR; INTRAVENOUS AS NEEDED
Status: CANCELLED
Start: 2020-02-03

## 2019-12-19 RX ORDER — HEPARIN 100 UNIT/ML
300-500 SYRINGE INTRAVENOUS AS NEEDED
Status: CANCELLED | OUTPATIENT
Start: 2020-01-22

## 2019-12-19 RX ORDER — ONDANSETRON 2 MG/ML
8 INJECTION INTRAMUSCULAR; INTRAVENOUS ONCE
Status: CANCELLED | OUTPATIENT
Start: 2020-02-03

## 2019-12-19 RX ORDER — SODIUM CHLORIDE 0.9 % (FLUSH) 0.9 %
10 SYRINGE (ML) INJECTION AS NEEDED
Status: CANCELLED | OUTPATIENT
Start: 2020-02-03

## 2019-12-19 RX ORDER — HEPARIN 100 UNIT/ML
300-500 SYRINGE INTRAVENOUS AS NEEDED
Status: CANCELLED | OUTPATIENT
Start: 2020-02-03

## 2019-12-19 RX ORDER — SODIUM CHLORIDE 9 MG/ML
10 INJECTION INTRAMUSCULAR; INTRAVENOUS; SUBCUTANEOUS AS NEEDED
Status: CANCELLED | OUTPATIENT
Start: 2020-01-22

## 2019-12-19 RX ORDER — FLUOROURACIL 50 MG/ML
300 INJECTION, SOLUTION INTRAVENOUS ONCE
Status: CANCELLED | OUTPATIENT
Start: 2020-01-06 | End: 2020-01-06

## 2019-12-19 RX ORDER — DEXTROSE MONOHYDRATE 50 MG/ML
25 INJECTION, SOLUTION INTRAVENOUS CONTINUOUS
Status: CANCELLED | OUTPATIENT
Start: 2020-01-06

## 2019-12-19 RX ORDER — ATROPINE SULFATE 0.4 MG/ML
0.4 INJECTION, SOLUTION ENDOTRACHEAL; INTRAMEDULLARY; INTRAMUSCULAR; INTRAVENOUS; SUBCUTANEOUS ONCE
Status: CANCELLED | OUTPATIENT
Start: 2020-01-20

## 2019-12-19 RX ORDER — EPINEPHRINE 1 MG/ML
0.3 INJECTION, SOLUTION, CONCENTRATE INTRAVENOUS AS NEEDED
Status: CANCELLED | OUTPATIENT
Start: 2020-01-06

## 2019-12-19 RX ORDER — ATROPINE SULFATE 0.4 MG/ML
0.4 INJECTION, SOLUTION ENDOTRACHEAL; INTRAMEDULLARY; INTRAMUSCULAR; INTRAVENOUS; SUBCUTANEOUS
Status: CANCELLED | OUTPATIENT
Start: 2020-01-06

## 2019-12-19 RX ORDER — EPINEPHRINE 1 MG/ML
0.3 INJECTION, SOLUTION, CONCENTRATE INTRAVENOUS AS NEEDED
Status: CANCELLED | OUTPATIENT
Start: 2020-01-20

## 2019-12-19 RX ORDER — DIPHENHYDRAMINE HYDROCHLORIDE 50 MG/ML
50 INJECTION, SOLUTION INTRAMUSCULAR; INTRAVENOUS AS NEEDED
Status: CANCELLED | OUTPATIENT
Start: 2020-01-06

## 2019-12-19 RX ORDER — ACETAMINOPHEN 325 MG/1
650 TABLET ORAL AS NEEDED
Status: CANCELLED | OUTPATIENT
Start: 2020-01-06

## 2019-12-19 RX ORDER — HEPARIN 100 UNIT/ML
300-500 SYRINGE INTRAVENOUS AS NEEDED
Status: CANCELLED | OUTPATIENT
Start: 2020-02-05

## 2019-12-19 RX ORDER — SODIUM CHLORIDE 0.9 % (FLUSH) 0.9 %
10 SYRINGE (ML) INJECTION AS NEEDED
Status: CANCELLED
Start: 2020-02-05

## 2019-12-19 RX ORDER — SODIUM CHLORIDE 0.9 % (FLUSH) 0.9 %
10 SYRINGE (ML) INJECTION AS NEEDED
Status: CANCELLED
Start: 2020-01-22

## 2019-12-19 RX ORDER — ATROPINE SULFATE 0.4 MG/ML
0.4 INJECTION, SOLUTION ENDOTRACHEAL; INTRAMEDULLARY; INTRAMUSCULAR; INTRAVENOUS; SUBCUTANEOUS
Status: CANCELLED | OUTPATIENT
Start: 2020-01-20

## 2019-12-19 RX ORDER — SODIUM CHLORIDE 0.9 % (FLUSH) 0.9 %
10 SYRINGE (ML) INJECTION AS NEEDED
Status: CANCELLED
Start: 2020-02-03

## 2019-12-19 RX ORDER — ONDANSETRON 2 MG/ML
8 INJECTION INTRAMUSCULAR; INTRAVENOUS AS NEEDED
Status: CANCELLED | OUTPATIENT
Start: 2020-02-03

## 2019-12-19 RX ORDER — DIPHENHYDRAMINE HYDROCHLORIDE 50 MG/ML
50 INJECTION, SOLUTION INTRAMUSCULAR; INTRAVENOUS AS NEEDED
Status: CANCELLED | OUTPATIENT
Start: 2020-02-03

## 2019-12-19 RX ORDER — ATROPINE SULFATE 0.4 MG/ML
0.4 INJECTION, SOLUTION ENDOTRACHEAL; INTRAMEDULLARY; INTRAMUSCULAR; INTRAVENOUS; SUBCUTANEOUS ONCE
Status: CANCELLED | OUTPATIENT
Start: 2020-02-03

## 2019-12-19 RX ORDER — DEXTROSE MONOHYDRATE 50 MG/ML
25 INJECTION, SOLUTION INTRAVENOUS CONTINUOUS
Status: CANCELLED | OUTPATIENT
Start: 2020-02-03

## 2019-12-19 RX ORDER — FLUOROURACIL 50 MG/ML
300 INJECTION, SOLUTION INTRAVENOUS ONCE
Status: CANCELLED | OUTPATIENT
Start: 2020-01-20 | End: 2020-01-20

## 2019-12-19 RX ORDER — HYDROCORTISONE SODIUM SUCCINATE 100 MG/2ML
100 INJECTION, POWDER, FOR SOLUTION INTRAMUSCULAR; INTRAVENOUS AS NEEDED
Status: CANCELLED | OUTPATIENT
Start: 2020-01-06

## 2019-12-19 RX ORDER — ONDANSETRON 2 MG/ML
8 INJECTION INTRAMUSCULAR; INTRAVENOUS ONCE
Status: CANCELLED | OUTPATIENT
Start: 2020-01-20

## 2019-12-19 RX ORDER — HYDROCORTISONE SODIUM SUCCINATE 100 MG/2ML
100 INJECTION, POWDER, FOR SOLUTION INTRAMUSCULAR; INTRAVENOUS AS NEEDED
Status: CANCELLED | OUTPATIENT
Start: 2020-01-20

## 2019-12-19 RX ORDER — ALBUTEROL SULFATE 0.83 MG/ML
2.5 SOLUTION RESPIRATORY (INHALATION) AS NEEDED
Status: CANCELLED
Start: 2020-02-03

## 2019-12-19 RX ORDER — SODIUM CHLORIDE 0.9 % (FLUSH) 0.9 %
10 SYRINGE (ML) INJECTION AS NEEDED
Status: CANCELLED
Start: 2020-01-20

## 2019-12-19 RX ORDER — SODIUM CHLORIDE 9 MG/ML
10 INJECTION INTRAMUSCULAR; INTRAVENOUS; SUBCUTANEOUS AS NEEDED
Status: CANCELLED | OUTPATIENT
Start: 2020-02-05

## 2019-12-19 RX ORDER — SODIUM CHLORIDE 0.9 % (FLUSH) 0.9 %
10 SYRINGE (ML) INJECTION AS NEEDED
Status: CANCELLED
Start: 2020-01-06

## 2019-12-19 RX ORDER — ALBUTEROL SULFATE 0.83 MG/ML
2.5 SOLUTION RESPIRATORY (INHALATION) AS NEEDED
Status: CANCELLED
Start: 2020-01-20

## 2019-12-30 DIAGNOSIS — C25.0 MALIGNANT NEOPLASM OF HEAD OF PANCREAS (HCC): Primary | ICD-10-CM

## 2019-12-30 NOTE — TELEPHONE ENCOUNTER
Patient request a refill of Percocet 5/325 . Patient is taking 2 in morning and 2 in afternoon, occasional 1 mid day. He is requesting 120 tablets to allow him 30 days of Pain medication. Please change if okay.

## 2020-01-02 ENCOUNTER — TELEPHONE (OUTPATIENT)
Dept: ONCOLOGY | Age: 80
End: 2020-01-02

## 2020-01-02 RX ORDER — OXYCODONE AND ACETAMINOPHEN 5; 325 MG/1; MG/1
2 TABLET ORAL
Qty: 90 TAB | Refills: 0 | Status: SHIPPED | OUTPATIENT
Start: 2020-01-02 | End: 2020-01-16

## 2020-01-02 NOTE — TELEPHONE ENCOUNTER
Patient received Rx for Oxycodone 90 tab. Patient would like to speak with you pertaining to the conversation from Monday.

## 2020-01-02 NOTE — TELEPHONE ENCOUNTER
HIPAA verified. Patient asking about possible getting 120 tabs per Rx. Per Dr Moose Claire we can fill RX when patient needs more, due to pain/cancer. Patient verbalized understanding, thanked for call.

## 2020-01-06 DIAGNOSIS — C25.0 MALIGNANT NEOPLASM OF HEAD OF PANCREAS (HCC): ICD-10-CM

## 2020-01-21 NOTE — PROGRESS NOTES
Abdiel Kaplan a 77 y. o. male who is seen for follow up pancreatic cancer. Patient feels his neuropathy in his hands and feet is getting a little better. Sometimes the neuropathy goes all the way up to his knees. Patient has noted for the past few weeks he is not sleeping as well, and this is getting worse. Patient stopped taking his Oxycodone, fentanyl, lorazapam and Gabapentin 1-2 weeks ago. He noted he had skipped his noon pill one day, that evening he was \"gittery\" he did not like this feeling. Patient is scheduled to received steroid injections between L4 and L3 (about) tomorrow . Chemotherapy Flowsheet 1/20/2020   Cycle C 24 D 1   Date 1/20/2020   Drug / Regimen Mod folfiri   Dosage -   Time Up -   Time Down -   Pre Hydration -   Pre Meds ,aloxi    Notes CIV 5 FU pump     Key Pain Meds             oxyCODONE IR (ROXICODONE) 5 mg immediate release tablet Take 5 mg by mouth every four (4) hours as needed for Pain. acetaminophen (TYLENOL EXTRA STRENGTH) 500 mg tablet Take 500 mg by mouth every six (6) hours as needed for Pain. Key Oncology Meds             ondansetron (ZOFRAN ODT) 4 mg disintegrating tablet Take 1 Tab by mouth every eight (8) hours as needed for Nausea.  Indications: Nausea and Vomiting caused by Cancer Drugs

## 2020-01-22 ENCOUNTER — OFFICE VISIT (OUTPATIENT)
Dept: ONCOLOGY | Age: 80
End: 2020-01-22

## 2020-01-22 VITALS
DIASTOLIC BLOOD PRESSURE: 62 MMHG | BODY MASS INDEX: 24.08 KG/M2 | TEMPERATURE: 97.3 F | HEART RATE: 86 BPM | OXYGEN SATURATION: 94 % | WEIGHT: 177.8 LBS | RESPIRATION RATE: 22 BRPM | HEIGHT: 72 IN | SYSTOLIC BLOOD PRESSURE: 123 MMHG

## 2020-01-22 DIAGNOSIS — C25.0 MALIGNANT NEOPLASM OF HEAD OF PANCREAS (HCC): Primary | ICD-10-CM

## 2020-01-22 RX ORDER — SODIUM CHLORIDE 9 MG/ML
10 INJECTION INTRAMUSCULAR; INTRAVENOUS; SUBCUTANEOUS AS NEEDED
Status: CANCELLED | OUTPATIENT
Start: 2020-01-22

## 2020-01-22 RX ORDER — HEPARIN 100 UNIT/ML
300-500 SYRINGE INTRAVENOUS AS NEEDED
Status: CANCELLED | OUTPATIENT
Start: 2020-01-22

## 2020-01-22 RX ORDER — SODIUM CHLORIDE 0.9 % (FLUSH) 0.9 %
10 SYRINGE (ML) INJECTION AS NEEDED
Status: CANCELLED
Start: 2020-01-22

## 2020-01-22 NOTE — PROGRESS NOTES
Reason for Visit:   Therman Coast a [de-identified] y. o. male who is seen for follow up pancreatic cancer     Treatment History:   Dx: Metastatic Pancreatic Adenocarcinoma--Nov 2018--peritoneal mets  Tx: FOLFIRINOX--first cycle 2/4/19, second cycle 2/18/19, third cycle 3/4/19, fourth cycle on 3/18/19, 5th on 4/1/19, 6th 4/15/2019, 7th 4/29/19, 8th 5/13/19, 9th 6/5/19 (dose reduced due to toxicity), 10th 6/24/19, 11th 7/5/19, 12th 7/22/2019, 13th 8/5/2019, 14th 8/19/2019, 15th 8/9/2019, 16th 9/3/2019, 17th 9/23/2019, 18th 10/7/2019, 19 10/21/2019, 20 11/4/2019, 21 11/18/2019, 22 12/2/2019 (stopped the [facility]), 23 12/16/2019 (no Oxali), 24 1/6/2020 (no Oxali), 25 1/20/2020 (no Oxali)  Goal: prolong survival, Cycle length: until progression, Will return to see me prior to each cycle    History of Present Illness:   Doing ok, continues to feel like his neuropathy has improved since stopping the oxaliplatin. Not sleeping as well. Stopped oxycodone, fentanyl, and gabapentin a week ago. Feeling ok since stopping.     Past Medical History:   Diagnosis Date    CAD (coronary artery disease)     3 cardiac stents   heart Dr. Evens Madrigal Cervical spondylosis without myelopathy 2009    Chronic pain     shoulder left, upper lumbar    Depressive disorder, not elsewhere classified     Diaphragmatic hernia without mention of obstruction or gangrene     GERD (gastroesophageal reflux disease)     Hypercholesterolemia     Hypertension     Hypertrophy of prostate without urinary obstruction and other lower urinary tract symptoms (LUTS) 2008    Insomnia, unspecified 2009    Lumbosacral spondylosis without myelopathy 2009    Osteoarthrosis involving, or with mention of more than one site, but not specified as generalized, multiple sites 2010    Varicose veins 12/11/2014      Past Surgical History:   Procedure Laterality Date    CABG, ARTERY-VEIN, TWO  1/9/12    CABG    HX APPENDECTOMY  1962    HX CATARACT REMOVAL 2015    bilateral    HX COLONOSCOPY  2010    x2 small beign polyps    HX COLONOSCOPY  2017    hyperplastic polyp    HX ENDOSCOPY  2010    acid reflux    HX HEART CATHETERIZATION  ,     3 stents    HX HEENT      fx nose repair    HX HERNIA REPAIR  early     left inguinal, with mesh    HX ORTHOPAEDIC  2017    lumbar fusion    HX OTHER SURGICAL  7 yrs. old    Hiatal Hernia    HX TONSILLECTOMY      HX VASCULAR ACCESS      IR INSERT TUNL CVAD W PORT LESS THAN 5 YR  2019      Social History     Tobacco Use    Smoking status: Former Smoker     Packs/day: 1.00     Years: 10.00     Pack years: 10.00     Types: Cigarettes     Start date: 1961     Last attempt to quit: 1971     Years since quittin.0    Smokeless tobacco: Never Used   Substance Use Topics    Alcohol use: No     Frequency: Never     Binge frequency: Never      Family History   Problem Relation Age of Onset    Hypertension Father     Heart Disease Father     Arthritis-osteo Mother     Cancer Brother 78        tumor in back    Heart Disease Brother     Diabetes Maternal Grandfather     Kidney Disease Maternal Grandfather      Current Outpatient Medications   Medication Sig    oxyCODONE IR (ROXICODONE) 5 mg immediate release tablet Take 5 mg by mouth every four (4) hours as needed for Pain.  gabapentin (NEURONTIN) 300 mg capsule Take 1 Cap by mouth three (3) times daily. Max Daily Amount: 900 mg. Indications: Neuropathic Pain    ondansetron (ZOFRAN ODT) 4 mg disintegrating tablet Take 1 Tab by mouth every eight (8) hours as needed for Nausea. Indications: Nausea and Vomiting caused by Cancer Drugs    dexAMETHasone (DECADRON) 4 mg tablet Take 8 mg by mouth daily. Indications: Prevent Nausea and Vomiting from Cancer Chemotherapy    DULoxetine (CYMBALTA) 60 mg capsule Take 1 Cap by mouth daily. Indications: Chronic Muscle or Bone Pain    rosuvastatin (CRESTOR) 10 mg tablet Take 1 Tab by mouth nightly. Indications: excessive fat in the blood    rivaroxaban (XARELTO) 20 mg tab tablet Take 1 Tab by mouth daily (with breakfast). Indications: a clot in the lung    tamsulosin (FLOMAX) 0.4 mg capsule TAKE 2 CAPSULE BY MOUTH NIGHTLY.  acetaminophen (TYLENOL EXTRA STRENGTH) 500 mg tablet Take 500 mg by mouth every six (6) hours as needed for Pain.  albuterol (VENTOLIN HFA) 90 mcg/actuation inhaler Take 2 Puffs by inhalation every four (4) hours as needed for Wheezing.  LORazepam (ATIVAN) 0.5 mg tablet Take 1 Tab by mouth every four (4) hours as needed for Anxiety. Max Daily Amount: 3 mg. Indications: Nausea and Vomiting caused by Cancer Drugs    omeprazole (PRILOSEC) 20 mg capsule Take 20 mg by mouth daily.  polyethylene glycol (MIRALAX) 17 gram/dose powder Take 17 g by mouth daily.  amLODIPine (NORVASC) 5 mg tablet Take 5 mg by mouth daily.  METOPROLOL TARTRATE PO Take 12.5 mg by mouth two (2) times a day.  multivitamin (ONE A DAY) tablet Take 1 Tab by mouth daily.  aspirin 81 mg tablet Take 81 mg by mouth.  dutasteride (AVODART) 0.5 mg capsule Take 0.5 mg by mouth daily. No current facility-administered medications for this visit. No Known Allergies     Review of Systems: A complete review of systems was obtained, negative except as described above. Physical Exam:   There were no vitals taken for this visit. ECOG PS: 0  General: No distress  Eyes: PERRLA, anicteric sclerae  HENT: Atraumatic, OP clear  Neck: Supple  Lymphatic: No cervical, supraclavicular, or inguinal adenopathy  Respiratory: CTAB, normal respiratory effort  CV: Normal rate, regular rhythm, no murmurs, no peripheral edema  GI: Soft, nontender, nondistended, no masses, no hepatomegaly, no splenomegaly  MS: Normal gait and station. Digits without clubbing or cyanosis. Skin: No rashes, ecchymoses, or petechiae. Normal temperature, turgor, and texture.   Psych: Alert, oriented, appropriate affect, normal judgment/insight    Results:     Lab Results   Component Value Date/Time    WBC 3.8 (L) 01/20/2020 09:15 AM    HGB 10.9 (L) 01/20/2020 09:15 AM    HCT 33.4 (L) 01/20/2020 09:15 AM    PLATELET 055 (L) 70/71/4165 09:15 AM    .4 (H) 01/20/2020 09:15 AM    ABS. NEUTROPHILS 2.6 01/20/2020 09:15 AM     Lab Results   Component Value Date/Time    Sodium 141 01/20/2020 09:15 AM    Potassium 4.2 01/20/2020 09:15 AM    Chloride 104 01/20/2020 09:15 AM    CO2 25 01/20/2020 09:15 AM    Glucose 115 (H) 01/20/2020 09:15 AM    BUN 14 01/20/2020 09:15 AM    Creatinine 0.83 01/20/2020 09:15 AM    GFR est AA >60 01/20/2020 09:15 AM    GFR est non-AA >60 01/20/2020 09:15 AM    Calcium 8.8 01/20/2020 09:15 AM    Glucose (POC) 112 (H) 01/12/2012 06:32 AM     Lab Results   Component Value Date/Time    Bilirubin, total 0.3 01/20/2020 09:15 AM    ALT (SGPT) 23 01/20/2020 09:15 AM    AST (SGOT) 20 01/20/2020 09:15 AM    Alk. phosphatase 66 01/20/2020 09:15 AM    Protein, total 6.2 (L) 01/20/2020 09:15 AM    Albumin 3.3 (L) 01/20/2020 09:15 AM    Globulin 2.9 01/20/2020 09:15 AM       CT C/A/P 4/5/2019  Resolved pulmonary emboli possible persistent right femoral vein DVT. Positive carcinomatosis with interval decrease in amount of ascites. Incidental cholelithiasis, lower lumbar fusion and hepatic cysts     CT C/A/P 6/21/2019  1. Mild interval decrease in size of pancreatic head/neck mass. 2. Stable peritoneal carcinomatosis. New pocket of loculated ascites underneath  the right hemidiaphragm measuring 3.3 x 2.7 x 2.3 cm.  3. No evidence of metastatic disease in the chest.  4. Cholelithiasis      CT C/A/P 10/11/2019  IMPRESSION:  1. Further decrease in the size of the previously described small pancreatic  head mass lesion. 2. Continued evidence of focal nodular lesions in the anterior aspect of the  peritoneum compatible with carcinomatosis. No evidence of para-aortic  lymphadenopathy.   3. No focal intrathoracic nodules/mass lesions identified. No evidence of  mediastinal or hilar lymphadenopathy. 4. Persistence of the previously described focal, loculated collection of  ascites in the region of the right hemidiaphragm. 5. Normal sized liver. Persistence of the previously described multiple focal  low-density areas within liver suggestive of cysts. 6. Normal sized spleen. No focal splenic lesions identified. 7. Normal appearance of the kidneys. 8. Suboptimal distention of the urinary bladder (see discussion above) disease. 9. Relative enlargement of the prostate gland.     Assessment:   1) Metastatic Pancreatic Carcinoma with Peritoneal Metastasis  2) Neoplasm Pain  3) PE     Plan:   1) Continue with dose reduced FOLFIRINOX-Repeat Imaging shows its decreasing in size so its correlating with his CA 19-9 which continues to drop.  We have stopped the Oxaliplatin due to his worsening neuropathy--Will plan on seeing him in 1 month.    2) Pain controlled much better since stopping the oxaliplatin--stopped the Fentanyl and oxycodone on his own--2 weeks ago--will restart the nortriptyline 25mg bedtime to see if helps with some of the remaining symptoms as well as sleep.   3) Continue Xarelto 20mg daily         Signed By: Venancio Prince MD

## 2020-01-26 RX ORDER — TAMSULOSIN HYDROCHLORIDE 0.4 MG/1
CAPSULE ORAL
Qty: 180 CAP | Refills: 1 | Status: SHIPPED | OUTPATIENT
Start: 2020-01-26 | End: 2020-09-16

## 2020-02-07 RX ORDER — NORTRIPTYLINE HYDROCHLORIDE 25 MG/1
25 CAPSULE ORAL
Qty: 30 CAP | Refills: 3 | Status: SHIPPED | OUTPATIENT
Start: 2020-02-07 | End: 2020-02-19 | Stop reason: SDUPTHER

## 2020-02-12 RX ORDER — SODIUM CHLORIDE 0.9 % (FLUSH) 0.9 %
10 SYRINGE (ML) INJECTION AS NEEDED
Status: CANCELLED
Start: 2020-03-04

## 2020-02-12 RX ORDER — DEXTROSE MONOHYDRATE 50 MG/ML
25 INJECTION, SOLUTION INTRAVENOUS CONTINUOUS
Status: CANCELLED | OUTPATIENT
Start: 2020-02-17

## 2020-02-12 RX ORDER — HEPARIN 100 UNIT/ML
300-500 SYRINGE INTRAVENOUS AS NEEDED
Status: CANCELLED | OUTPATIENT
Start: 2020-02-17

## 2020-02-12 RX ORDER — ACETAMINOPHEN 325 MG/1
650 TABLET ORAL AS NEEDED
Status: CANCELLED | OUTPATIENT
Start: 2020-03-02

## 2020-02-12 RX ORDER — ACETAMINOPHEN 325 MG/1
650 TABLET ORAL AS NEEDED
Status: CANCELLED | OUTPATIENT
Start: 2020-02-17

## 2020-02-12 RX ORDER — ONDANSETRON 2 MG/ML
8 INJECTION INTRAMUSCULAR; INTRAVENOUS AS NEEDED
Status: CANCELLED | OUTPATIENT
Start: 2020-03-02

## 2020-02-12 RX ORDER — ATROPINE SULFATE 0.4 MG/ML
0.4 INJECTION, SOLUTION ENDOTRACHEAL; INTRAMEDULLARY; INTRAMUSCULAR; INTRAVENOUS; SUBCUTANEOUS ONCE
Status: CANCELLED | OUTPATIENT
Start: 2020-03-02

## 2020-02-12 RX ORDER — HYDROCORTISONE SODIUM SUCCINATE 100 MG/2ML
100 INJECTION, POWDER, FOR SOLUTION INTRAMUSCULAR; INTRAVENOUS AS NEEDED
Status: CANCELLED | OUTPATIENT
Start: 2020-02-17

## 2020-02-12 RX ORDER — SODIUM CHLORIDE 0.9 % (FLUSH) 0.9 %
10 SYRINGE (ML) INJECTION AS NEEDED
Status: CANCELLED
Start: 2020-02-17

## 2020-02-12 RX ORDER — ATROPINE SULFATE 0.4 MG/ML
0.4 INJECTION, SOLUTION ENDOTRACHEAL; INTRAMEDULLARY; INTRAMUSCULAR; INTRAVENOUS; SUBCUTANEOUS ONCE
Status: CANCELLED | OUTPATIENT
Start: 2020-02-17

## 2020-02-12 RX ORDER — SODIUM CHLORIDE 0.9 % (FLUSH) 0.9 %
10 SYRINGE (ML) INJECTION AS NEEDED
Status: CANCELLED | OUTPATIENT
Start: 2020-02-17

## 2020-02-12 RX ORDER — DIPHENHYDRAMINE HYDROCHLORIDE 50 MG/ML
50 INJECTION, SOLUTION INTRAMUSCULAR; INTRAVENOUS AS NEEDED
Status: CANCELLED | OUTPATIENT
Start: 2020-02-17

## 2020-02-12 RX ORDER — ATROPINE SULFATE 0.4 MG/ML
0.4 INJECTION, SOLUTION ENDOTRACHEAL; INTRAMEDULLARY; INTRAMUSCULAR; INTRAVENOUS; SUBCUTANEOUS
Status: CANCELLED | OUTPATIENT
Start: 2020-02-17

## 2020-02-12 RX ORDER — SODIUM CHLORIDE 9 MG/ML
10 INJECTION INTRAMUSCULAR; INTRAVENOUS; SUBCUTANEOUS AS NEEDED
Status: CANCELLED | OUTPATIENT
Start: 2020-03-04

## 2020-02-12 RX ORDER — DEXTROSE MONOHYDRATE 50 MG/ML
25 INJECTION, SOLUTION INTRAVENOUS CONTINUOUS
Status: CANCELLED | OUTPATIENT
Start: 2020-03-02

## 2020-02-12 RX ORDER — HEPARIN 100 UNIT/ML
300-500 SYRINGE INTRAVENOUS AS NEEDED
Status: CANCELLED | OUTPATIENT
Start: 2020-03-04

## 2020-02-12 RX ORDER — HEPARIN 100 UNIT/ML
300-500 SYRINGE INTRAVENOUS AS NEEDED
Status: CANCELLED | OUTPATIENT
Start: 2020-03-02

## 2020-02-12 RX ORDER — ATROPINE SULFATE 0.4 MG/ML
0.4 INJECTION, SOLUTION ENDOTRACHEAL; INTRAMEDULLARY; INTRAMUSCULAR; INTRAVENOUS; SUBCUTANEOUS
Status: CANCELLED | OUTPATIENT
Start: 2020-03-02

## 2020-02-12 RX ORDER — ALBUTEROL SULFATE 0.83 MG/ML
2.5 SOLUTION RESPIRATORY (INHALATION) AS NEEDED
Status: CANCELLED
Start: 2020-02-17

## 2020-02-12 RX ORDER — EPINEPHRINE 1 MG/ML
0.3 INJECTION, SOLUTION, CONCENTRATE INTRAVENOUS AS NEEDED
Status: CANCELLED | OUTPATIENT
Start: 2020-03-02

## 2020-02-12 RX ORDER — HYDROCORTISONE SODIUM SUCCINATE 100 MG/2ML
100 INJECTION, POWDER, FOR SOLUTION INTRAMUSCULAR; INTRAVENOUS AS NEEDED
Status: CANCELLED | OUTPATIENT
Start: 2020-03-02

## 2020-02-12 RX ORDER — SODIUM CHLORIDE 0.9 % (FLUSH) 0.9 %
10 SYRINGE (ML) INJECTION AS NEEDED
Status: CANCELLED
Start: 2020-02-19

## 2020-02-12 RX ORDER — DIPHENHYDRAMINE HYDROCHLORIDE 50 MG/ML
50 INJECTION, SOLUTION INTRAMUSCULAR; INTRAVENOUS AS NEEDED
Status: CANCELLED
Start: 2020-02-17

## 2020-02-12 RX ORDER — DIPHENHYDRAMINE HYDROCHLORIDE 50 MG/ML
50 INJECTION, SOLUTION INTRAMUSCULAR; INTRAVENOUS AS NEEDED
Status: CANCELLED
Start: 2020-03-02

## 2020-02-12 RX ORDER — DIPHENHYDRAMINE HYDROCHLORIDE 50 MG/ML
50 INJECTION, SOLUTION INTRAMUSCULAR; INTRAVENOUS AS NEEDED
Status: CANCELLED | OUTPATIENT
Start: 2020-03-02

## 2020-02-12 RX ORDER — HEPARIN 100 UNIT/ML
300-500 SYRINGE INTRAVENOUS AS NEEDED
Status: CANCELLED | OUTPATIENT
Start: 2020-02-19

## 2020-02-12 RX ORDER — SODIUM CHLORIDE 0.9 % (FLUSH) 0.9 %
10 SYRINGE (ML) INJECTION AS NEEDED
Status: CANCELLED
Start: 2020-03-02

## 2020-02-12 RX ORDER — SODIUM CHLORIDE 0.9 % (FLUSH) 0.9 %
10 SYRINGE (ML) INJECTION AS NEEDED
Status: CANCELLED | OUTPATIENT
Start: 2020-03-02

## 2020-02-12 RX ORDER — PALONOSETRON 0.05 MG/ML
0.25 INJECTION, SOLUTION INTRAVENOUS ONCE
Status: CANCELLED
Start: 2020-02-17

## 2020-02-12 RX ORDER — FLUOROURACIL 50 MG/ML
300 INJECTION, SOLUTION INTRAVENOUS ONCE
Status: CANCELLED | OUTPATIENT
Start: 2020-02-17 | End: 2020-02-17

## 2020-02-12 RX ORDER — SODIUM CHLORIDE 9 MG/ML
10 INJECTION INTRAMUSCULAR; INTRAVENOUS; SUBCUTANEOUS AS NEEDED
Status: CANCELLED | OUTPATIENT
Start: 2020-02-19

## 2020-02-12 RX ORDER — PALONOSETRON 0.05 MG/ML
0.25 INJECTION, SOLUTION INTRAVENOUS ONCE
Status: CANCELLED
Start: 2020-03-02

## 2020-02-12 RX ORDER — EPINEPHRINE 1 MG/ML
0.3 INJECTION, SOLUTION, CONCENTRATE INTRAVENOUS AS NEEDED
Status: CANCELLED | OUTPATIENT
Start: 2020-02-17

## 2020-02-12 RX ORDER — ONDANSETRON 2 MG/ML
8 INJECTION INTRAMUSCULAR; INTRAVENOUS AS NEEDED
Status: CANCELLED | OUTPATIENT
Start: 2020-02-17

## 2020-02-12 RX ORDER — FLUOROURACIL 50 MG/ML
300 INJECTION, SOLUTION INTRAVENOUS ONCE
Status: CANCELLED | OUTPATIENT
Start: 2020-03-02 | End: 2020-03-02

## 2020-02-12 RX ORDER — ALBUTEROL SULFATE 0.83 MG/ML
2.5 SOLUTION RESPIRATORY (INHALATION) AS NEEDED
Status: CANCELLED
Start: 2020-03-02

## 2020-02-17 NOTE — PROGRESS NOTES
Claudene Pupa a 77 y. o. male who is seen for follow up pancreatic cancer. Patient states he is feeling good, has back pain when moving about. Taking Ibuprofen for the pain. Patient had a slight bit of nausea the second day after chemo, none since. Concetta has rash on Abdomen,chest and back, he notices an itch on occasion. Chemotherapy Flowsheet 2/17/2020   Cycle C 26 D 1   Date 2/17/2020   Drug / Regimen Irinotecan/Leucovorin/5FU   Dosage see MAR   Time Up -   Time Down -   Pre Hydration -   Pre Meds aloxi 025mg, emend 150 mg, dexamethasone 12 mg, atropine 0.4 mg   Notes -     Key Oncology Meds             ondansetron (ZOFRAN ODT) 4 mg disintegrating tablet Take 1 Tab by mouth every eight (8) hours as needed for Nausea. Indications: Nausea and Vomiting caused by Cancer Drugs        Key Pain Meds             oxyCODONE IR (ROXICODONE) 5 mg immediate release tablet Take 5 mg by mouth every four (4) hours as needed for Pain. acetaminophen (TYLENOL EXTRA STRENGTH) 500 mg tablet Take 500 mg by mouth every six (6) hours as needed for Pain.         Last;  CT C/A/P 10/11/2019  NM Bone Scan 8/27/2019

## 2020-02-19 ENCOUNTER — OFFICE VISIT (OUTPATIENT)
Dept: ONCOLOGY | Age: 80
End: 2020-02-19

## 2020-02-19 VITALS
HEIGHT: 72 IN | WEIGHT: 179.8 LBS | BODY MASS INDEX: 24.35 KG/M2 | DIASTOLIC BLOOD PRESSURE: 62 MMHG | RESPIRATION RATE: 16 BRPM | SYSTOLIC BLOOD PRESSURE: 109 MMHG | OXYGEN SATURATION: 96 % | TEMPERATURE: 98.3 F | HEART RATE: 63 BPM

## 2020-02-19 DIAGNOSIS — C25.9 MALIGNANT NEOPLASM OF PANCREAS, UNSPECIFIED LOCATION OF MALIGNANCY (HCC): Primary | ICD-10-CM

## 2020-02-19 DIAGNOSIS — G62.9 NEUROPATHY: ICD-10-CM

## 2020-02-19 RX ORDER — NORTRIPTYLINE HYDROCHLORIDE 25 MG/1
50 CAPSULE ORAL
Qty: 30 CAP | Refills: 3 | Status: SHIPPED | OUTPATIENT
Start: 2020-02-19

## 2020-02-19 RX ORDER — IBUPROFEN 200 MG
400 TABLET ORAL
COMMUNITY

## 2020-02-19 NOTE — PROGRESS NOTES
Reason for Visit:   Jacquie hooks [de-identified] y. o. male who is seen for follow up pancreatic cancer     Treatment History:   Dx: Metastatic Pancreatic Adenocarcinoma--Nov 2018--peritoneal mets  Tx: FOLFIRINOX--first cycle 2/4/19, second cycle 2/18/19, third cycle 3/4/19, fourth cycle on 3/18/19, 5th on 4/1/19, 6th 4/15/2019, 7th 4/29/19, 8th 5/13/19, 9th 6/5/19 (dose reduced due to toxicity), 10th 6/24/19, 11th 7/5/19, 12th 7/22/2019, 13th 8/5/2019, 14th 8/19/2019, 15th 8/9/2019, 16th 9/3/2019, 17th 9/23/2019, 18th 10/7/2019, 19 10/21/2019, 20 11/4/2019, 21 11/18/2019, 22 12/2/2019 (stopped the [facility]), 23 12/16/2019 (no Oxali), 24 1/6/2020 (no Oxali), 25 1/20/2020 (no Oxali), 26 2/3/2020 (no Oxali), 27 2/17/2020 (no Oxali)  Goal: prolong survival, Cycle length: until progression, Will return to see me prior to each cycle    History of Present Illness:   Doing ok, some nausea and slight rash which are all normal after chemotherapy. Pain is managed without opiates--occassionally gets pain that goes to back but its mild and resolves on its on. Thinks the nortriptyline is helping with his neuropathy.     Past Medical History:   Diagnosis Date    CAD (coronary artery disease)     3 cardiac stents   heart Dr. Marilin Epperson Cervical spondylosis without myelopathy 2009    Chronic pain     shoulder left, upper lumbar    Depressive disorder, not elsewhere classified     Diaphragmatic hernia without mention of obstruction or gangrene     GERD (gastroesophageal reflux disease)     Hypercholesterolemia     Hypertension     Hypertrophy of prostate without urinary obstruction and other lower urinary tract symptoms (LUTS) 2008    Insomnia, unspecified 2009    Lumbosacral spondylosis without myelopathy 2009    Osteoarthrosis involving, or with mention of more than one site, but not specified as generalized, multiple sites 2010    Varicose veins 12/11/2014      Past Surgical History:   Procedure Laterality Date    CABG, ARTERY-VEIN, TWO  12    CABG    HX APPENDECTOMY      HX CATARACT REMOVAL  2015    bilateral    HX COLONOSCOPY  2010    x2 small beign polyps    HX COLONOSCOPY  2017    hyperplastic polyp    HX ENDOSCOPY  2010    acid reflux    HX HEART CATHETERIZATION  ,     3 stents    HX HEENT      fx nose repair    HX HERNIA REPAIR  early     left inguinal, with mesh    HX ORTHOPAEDIC  2017    lumbar fusion    HX OTHER SURGICAL  7 yrs. old    Hiatal Hernia    HX TONSILLECTOMY      HX VASCULAR ACCESS      IR INSERT TUNL CVAD W PORT LESS THAN 5 YR  2019      Social History     Tobacco Use    Smoking status: Former Smoker     Packs/day: 1.00     Years: 10.00     Pack years: 10.00     Types: Cigarettes     Start date: 1961     Last attempt to quit: 1971     Years since quittin.1    Smokeless tobacco: Never Used   Substance Use Topics    Alcohol use: No     Frequency: Never     Binge frequency: Never      Family History   Problem Relation Age of Onset    Hypertension Father     Heart Disease Father     Arthritis-osteo Mother     Cancer Brother 78        tumor in back    Heart Disease Brother     Diabetes Maternal Grandfather     Kidney Disease Maternal Grandfather      Current Outpatient Medications   Medication Sig    nortriptyline (PAMELOR) 25 mg capsule Take 1 Cap by mouth nightly.  tamsulosin (FLOMAX) 0.4 mg capsule TAKE 2 CAPSULES BY MOUTH NIGHLTY    oxyCODONE IR (ROXICODONE) 5 mg immediate release tablet Take 5 mg by mouth every four (4) hours as needed for Pain.  gabapentin (NEURONTIN) 300 mg capsule Take 1 Cap by mouth three (3) times daily. Max Daily Amount: 900 mg. Indications: Neuropathic Pain    ondansetron (ZOFRAN ODT) 4 mg disintegrating tablet Take 1 Tab by mouth every eight (8) hours as needed for Nausea. Indications: Nausea and Vomiting caused by Cancer Drugs    dexAMETHasone (DECADRON) 4 mg tablet Take 8 mg by mouth daily. Indications: Prevent Nausea and Vomiting from Cancer Chemotherapy    DULoxetine (CYMBALTA) 60 mg capsule Take 1 Cap by mouth daily. Indications: Chronic Muscle or Bone Pain    rosuvastatin (CRESTOR) 10 mg tablet Take 1 Tab by mouth nightly. Indications: excessive fat in the blood    rivaroxaban (XARELTO) 20 mg tab tablet Take 1 Tab by mouth daily (with breakfast). Indications: a clot in the lung    acetaminophen (TYLENOL EXTRA STRENGTH) 500 mg tablet Take 500 mg by mouth every six (6) hours as needed for Pain.  albuterol (VENTOLIN HFA) 90 mcg/actuation inhaler Take 2 Puffs by inhalation every four (4) hours as needed for Wheezing.  LORazepam (ATIVAN) 0.5 mg tablet Take 1 Tab by mouth every four (4) hours as needed for Anxiety. Max Daily Amount: 3 mg. Indications: Nausea and Vomiting caused by Cancer Drugs    omeprazole (PRILOSEC) 20 mg capsule Take 20 mg by mouth daily.  polyethylene glycol (MIRALAX) 17 gram/dose powder Take 17 g by mouth daily.  amLODIPine (NORVASC) 5 mg tablet Take 5 mg by mouth daily.  METOPROLOL TARTRATE PO Take 12.5 mg by mouth two (2) times a day.  multivitamin (ONE A DAY) tablet Take 1 Tab by mouth daily.  aspirin 81 mg tablet Take 81 mg by mouth.  dutasteride (AVODART) 0.5 mg capsule Take 0.5 mg by mouth daily. No current facility-administered medications for this visit. Facility-Administered Medications Ordered in Other Visits   Medication Dose Route Frequency    saline peripheral flush soln 10 mL  10 mL InterCATHeter PRN    0.9% sodium chloride injection 10 mL  10 mL IntraVENous PRN    heparin (porcine) pf 300-500 Units  300-500 Units InterCATHeter PRN      No Known Allergies     Review of Systems: A complete review of systems was obtained, negative except as described above. Physical Exam:   There were no vitals taken for this visit.   ECOG PS: 0  General: No distress  Eyes: PERRLA, anicteric sclerae  HENT: Atraumatic, OP clear  Neck: Supple  Lymphatic: No cervical, supraclavicular, or inguinal adenopathy  Respiratory: CTAB, normal respiratory effort  CV: Normal rate, regular rhythm, no murmurs, no peripheral edema  GI: Soft, nontender, nondistended, no masses, no hepatomegaly, no splenomegaly  MS: Normal gait and station. Digits without clubbing or cyanosis. Skin: No rashes, ecchymoses, or petechiae. Normal temperature, turgor, and texture. Psych: Alert, oriented, appropriate affect, normal judgment/insight    Results:     Lab Results   Component Value Date/Time    WBC 4.2 02/17/2020 09:15 AM    HGB 11.8 (L) 02/17/2020 09:15 AM    HCT 35.6 (L) 02/17/2020 09:15 AM    PLATELET 608 56/04/0057 09:15 AM    .9 (H) 02/17/2020 09:15 AM    ABS. NEUTROPHILS 2.9 02/17/2020 09:15 AM     Lab Results   Component Value Date/Time    Sodium 139 02/17/2020 09:15 AM    Potassium 4.2 02/17/2020 09:15 AM    Chloride 104 02/17/2020 09:15 AM    CO2 25 02/17/2020 09:15 AM    Glucose 115 (H) 02/17/2020 09:15 AM    BUN 23 (H) 02/17/2020 09:15 AM    Creatinine 1.01 02/17/2020 09:15 AM    GFR est AA >60 02/17/2020 09:15 AM    GFR est non-AA >60 02/17/2020 09:15 AM    Calcium 8.8 02/17/2020 09:15 AM    Glucose (POC) 112 (H) 01/12/2012 06:32 AM     Lab Results   Component Value Date/Time    Bilirubin, total 0.2 02/17/2020 09:15 AM    ALT (SGPT) 29 02/17/2020 09:15 AM    AST (SGOT) 20 02/17/2020 09:15 AM    Alk. phosphatase 61 02/17/2020 09:15 AM    Protein, total 6.4 02/17/2020 09:15 AM    Albumin 3.5 02/17/2020 09:15 AM    Globulin 2.9 02/17/2020 09:15 AM       CT C/A/P 4/5/2019  Resolved pulmonary emboli possible persistent right femoral vein DVT. Positive carcinomatosis with interval decrease in amount of ascites. Incidental cholelithiasis, lower lumbar fusion and hepatic cysts     CT C/A/P 6/21/2019  1. Mild interval decrease in size of pancreatic head/neck mass. 2. Stable peritoneal carcinomatosis.  New pocket of loculated ascites underneath  the right hemidiaphragm measuring 3.3 x 2.7 x 2.3 cm.  3. No evidence of metastatic disease in the chest.  4. Cholelithiasis      CT C/A/P 10/11/2019  IMPRESSION:  1. Further decrease in the size of the previously described small pancreatic  head mass lesion. 2. Continued evidence of focal nodular lesions in the anterior aspect of the  peritoneum compatible with carcinomatosis. No evidence of para-aortic  lymphadenopathy. 3. No focal intrathoracic nodules/mass lesions identified. No evidence of  mediastinal or hilar lymphadenopathy. 4. Persistence of the previously described focal, loculated collection of  ascites in the region of the right hemidiaphragm. 5. Normal sized liver. Persistence of the previously described multiple focal  low-density areas within liver suggestive of cysts. 6. Normal sized spleen. No focal splenic lesions identified. 7. Normal appearance of the kidneys. 8. Suboptimal distention of the urinary bladder (see discussion above) disease. 9. Relative enlargement of the prostate gland.     Assessment:   1) Metastatic Pancreatic Carcinoma with Peritoneal Metastasis  2) Neoplasm Pain  3) PE  4) Back Pain     Plan:   1) Continue with dose reduced FOLFIRINOX-Repeat Imaging shows its decreasing in size so has correlated with his CA 19-9 which continues to drop.  We have stopped the Oxaliplatin due to his worsening neuropathy--Will plan on repeating scans and see me again in 6 weeks. .    2) Pain controlled much better since stopping the oxaliplatin--stopped the Fentanyl and oxycodone on his own will increase the nortriptyline to 50mg bedtime to see if helps with some of the remaining symptoms as well as sleep. 3) Continue Xarelto 20mg daily  4) Likely due to OA/DJD as it hasn't improved with the improving pancreatic ca.       Signed By: Allison Gracia MD

## 2020-02-27 RX ORDER — ACETAMINOPHEN 325 MG/1
650 TABLET ORAL AS NEEDED
Status: CANCELLED | OUTPATIENT
Start: 2020-06-29

## 2020-02-27 RX ORDER — ALBUTEROL SULFATE 0.83 MG/ML
2.5 SOLUTION RESPIRATORY (INHALATION) AS NEEDED
Status: CANCELLED
Start: 2020-03-30

## 2020-02-27 RX ORDER — EPINEPHRINE 1 MG/ML
0.3 INJECTION, SOLUTION, CONCENTRATE INTRAVENOUS AS NEEDED
Status: CANCELLED | OUTPATIENT
Start: 2020-06-01

## 2020-02-27 RX ORDER — FLUOROURACIL 50 MG/ML
300 INJECTION, SOLUTION INTRAVENOUS ONCE
Status: CANCELLED | OUTPATIENT
Start: 2020-06-15 | End: 2020-06-15

## 2020-02-27 RX ORDER — ATROPINE SULFATE 0.4 MG/ML
0.4 INJECTION, SOLUTION ENDOTRACHEAL; INTRAMEDULLARY; INTRAMUSCULAR; INTRAVENOUS; SUBCUTANEOUS ONCE
Status: CANCELLED | OUTPATIENT
Start: 2020-03-16

## 2020-02-27 RX ORDER — DIPHENHYDRAMINE HYDROCHLORIDE 50 MG/ML
50 INJECTION, SOLUTION INTRAMUSCULAR; INTRAVENOUS AS NEEDED
Status: CANCELLED
Start: 2020-06-01

## 2020-02-27 RX ORDER — SODIUM CHLORIDE 0.9 % (FLUSH) 0.9 %
10 SYRINGE (ML) INJECTION AS NEEDED
Status: CANCELLED
Start: 2020-07-13

## 2020-02-27 RX ORDER — HEPARIN 100 UNIT/ML
300-500 SYRINGE INTRAVENOUS AS NEEDED
Status: CANCELLED | OUTPATIENT
Start: 2020-06-29

## 2020-02-27 RX ORDER — SODIUM CHLORIDE 0.9 % (FLUSH) 0.9 %
10 SYRINGE (ML) INJECTION AS NEEDED
Status: CANCELLED
Start: 2020-05-11

## 2020-02-27 RX ORDER — HEPARIN 100 UNIT/ML
300-500 SYRINGE INTRAVENOUS AS NEEDED
Status: CANCELLED | OUTPATIENT
Start: 2020-06-15

## 2020-02-27 RX ORDER — FLUOROURACIL 50 MG/ML
300 INJECTION, SOLUTION INTRAVENOUS ONCE
Status: CANCELLED | OUTPATIENT
Start: 2020-06-01 | End: 2020-06-01

## 2020-02-27 RX ORDER — DIPHENHYDRAMINE HYDROCHLORIDE 50 MG/ML
50 INJECTION, SOLUTION INTRAMUSCULAR; INTRAVENOUS AS NEEDED
Status: CANCELLED
Start: 2020-05-11

## 2020-02-27 RX ORDER — ATROPINE SULFATE 0.4 MG/ML
0.4 INJECTION, SOLUTION ENDOTRACHEAL; INTRAMEDULLARY; INTRAMUSCULAR; INTRAVENOUS; SUBCUTANEOUS ONCE
Status: CANCELLED | OUTPATIENT
Start: 2020-06-15

## 2020-02-27 RX ORDER — DIPHENHYDRAMINE HYDROCHLORIDE 50 MG/ML
50 INJECTION, SOLUTION INTRAMUSCULAR; INTRAVENOUS AS NEEDED
Status: CANCELLED
Start: 2020-03-16

## 2020-02-27 RX ORDER — HYDROCORTISONE SODIUM SUCCINATE 100 MG/2ML
100 INJECTION, POWDER, FOR SOLUTION INTRAMUSCULAR; INTRAVENOUS AS NEEDED
Status: CANCELLED | OUTPATIENT
Start: 2020-05-11

## 2020-02-27 RX ORDER — DIPHENHYDRAMINE HYDROCHLORIDE 50 MG/ML
50 INJECTION, SOLUTION INTRAMUSCULAR; INTRAVENOUS AS NEEDED
Status: CANCELLED | OUTPATIENT
Start: 2020-06-01

## 2020-02-27 RX ORDER — SODIUM CHLORIDE 0.9 % (FLUSH) 0.9 %
10 SYRINGE (ML) INJECTION AS NEEDED
Status: CANCELLED
Start: 2020-06-17

## 2020-02-27 RX ORDER — HEPARIN 100 UNIT/ML
300-500 SYRINGE INTRAVENOUS AS NEEDED
Status: CANCELLED | OUTPATIENT
Start: 2020-06-03

## 2020-02-27 RX ORDER — ONDANSETRON 2 MG/ML
8 INJECTION INTRAMUSCULAR; INTRAVENOUS AS NEEDED
Status: CANCELLED | OUTPATIENT
Start: 2020-04-13

## 2020-02-27 RX ORDER — ONDANSETRON 2 MG/ML
8 INJECTION INTRAMUSCULAR; INTRAVENOUS AS NEEDED
Status: CANCELLED | OUTPATIENT
Start: 2020-06-15

## 2020-02-27 RX ORDER — FLUOROURACIL 50 MG/ML
300 INJECTION, SOLUTION INTRAVENOUS ONCE
Status: CANCELLED | OUTPATIENT
Start: 2020-03-16 | End: 2020-03-16

## 2020-02-27 RX ORDER — HEPARIN 100 UNIT/ML
300-500 SYRINGE INTRAVENOUS AS NEEDED
Status: CANCELLED | OUTPATIENT
Start: 2020-07-15

## 2020-02-27 RX ORDER — DEXTROSE MONOHYDRATE 50 MG/ML
25 INJECTION, SOLUTION INTRAVENOUS CONTINUOUS
Status: CANCELLED | OUTPATIENT
Start: 2020-05-11

## 2020-02-27 RX ORDER — SODIUM CHLORIDE 0.9 % (FLUSH) 0.9 %
10 SYRINGE (ML) INJECTION AS NEEDED
Status: CANCELLED | OUTPATIENT
Start: 2020-07-13

## 2020-02-27 RX ORDER — ATROPINE SULFATE 0.4 MG/ML
0.4 INJECTION, SOLUTION ENDOTRACHEAL; INTRAMEDULLARY; INTRAMUSCULAR; INTRAVENOUS; SUBCUTANEOUS
Status: CANCELLED | OUTPATIENT
Start: 2020-04-13

## 2020-02-27 RX ORDER — DIPHENHYDRAMINE HYDROCHLORIDE 50 MG/ML
50 INJECTION, SOLUTION INTRAMUSCULAR; INTRAVENOUS AS NEEDED
Status: CANCELLED
Start: 2020-06-29

## 2020-02-27 RX ORDER — ALBUTEROL SULFATE 0.83 MG/ML
2.5 SOLUTION RESPIRATORY (INHALATION) AS NEEDED
Status: CANCELLED
Start: 2020-06-29

## 2020-02-27 RX ORDER — ACETAMINOPHEN 325 MG/1
650 TABLET ORAL AS NEEDED
Status: CANCELLED | OUTPATIENT
Start: 2020-06-01

## 2020-02-27 RX ORDER — DEXTROSE MONOHYDRATE 50 MG/ML
25 INJECTION, SOLUTION INTRAVENOUS CONTINUOUS
Status: CANCELLED | OUTPATIENT
Start: 2020-06-15

## 2020-02-27 RX ORDER — ACETAMINOPHEN 325 MG/1
650 TABLET ORAL AS NEEDED
Status: CANCELLED | OUTPATIENT
Start: 2020-06-15

## 2020-02-27 RX ORDER — SODIUM CHLORIDE 9 MG/ML
10 INJECTION INTRAMUSCULAR; INTRAVENOUS; SUBCUTANEOUS AS NEEDED
Status: CANCELLED | OUTPATIENT
Start: 2020-06-03

## 2020-02-27 RX ORDER — EPINEPHRINE 1 MG/ML
0.3 INJECTION, SOLUTION, CONCENTRATE INTRAVENOUS AS NEEDED
Status: CANCELLED | OUTPATIENT
Start: 2020-05-11

## 2020-02-27 RX ORDER — ATROPINE SULFATE 0.4 MG/ML
0.4 INJECTION, SOLUTION ENDOTRACHEAL; INTRAMEDULLARY; INTRAMUSCULAR; INTRAVENOUS; SUBCUTANEOUS
Status: CANCELLED | OUTPATIENT
Start: 2020-03-30

## 2020-02-27 RX ORDER — SODIUM CHLORIDE 0.9 % (FLUSH) 0.9 %
10 SYRINGE (ML) INJECTION AS NEEDED
Status: CANCELLED | OUTPATIENT
Start: 2020-03-30

## 2020-02-27 RX ORDER — HEPARIN 100 UNIT/ML
300-500 SYRINGE INTRAVENOUS AS NEEDED
Status: CANCELLED | OUTPATIENT
Start: 2020-04-13

## 2020-02-27 RX ORDER — DEXTROSE MONOHYDRATE 50 MG/ML
25 INJECTION, SOLUTION INTRAVENOUS CONTINUOUS
Status: CANCELLED | OUTPATIENT
Start: 2020-03-16

## 2020-02-27 RX ORDER — SODIUM CHLORIDE 9 MG/ML
10 INJECTION INTRAMUSCULAR; INTRAVENOUS; SUBCUTANEOUS AS NEEDED
Status: CANCELLED | OUTPATIENT
Start: 2020-05-13

## 2020-02-27 RX ORDER — PALONOSETRON 0.05 MG/ML
0.25 INJECTION, SOLUTION INTRAVENOUS ONCE
Status: CANCELLED
Start: 2020-06-01

## 2020-02-27 RX ORDER — FLUOROURACIL 50 MG/ML
300 INJECTION, SOLUTION INTRAVENOUS ONCE
Status: CANCELLED | OUTPATIENT
Start: 2020-04-13 | End: 2020-04-13

## 2020-02-27 RX ORDER — EPINEPHRINE 1 MG/ML
0.3 INJECTION, SOLUTION, CONCENTRATE INTRAVENOUS AS NEEDED
Status: CANCELLED | OUTPATIENT
Start: 2020-03-30

## 2020-02-27 RX ORDER — ALBUTEROL SULFATE 0.83 MG/ML
2.5 SOLUTION RESPIRATORY (INHALATION) AS NEEDED
Status: CANCELLED
Start: 2020-04-13

## 2020-02-27 RX ORDER — FLUOROURACIL 50 MG/ML
300 INJECTION, SOLUTION INTRAVENOUS ONCE
Status: CANCELLED | OUTPATIENT
Start: 2020-07-13 | End: 2020-07-13

## 2020-02-27 RX ORDER — HEPARIN 100 UNIT/ML
300-500 SYRINGE INTRAVENOUS AS NEEDED
Status: CANCELLED | OUTPATIENT
Start: 2020-05-11

## 2020-02-27 RX ORDER — SODIUM CHLORIDE 0.9 % (FLUSH) 0.9 %
10 SYRINGE (ML) INJECTION AS NEEDED
Status: CANCELLED
Start: 2020-06-01

## 2020-02-27 RX ORDER — ATROPINE SULFATE 0.4 MG/ML
0.4 INJECTION, SOLUTION ENDOTRACHEAL; INTRAMEDULLARY; INTRAMUSCULAR; INTRAVENOUS; SUBCUTANEOUS
Status: CANCELLED | OUTPATIENT
Start: 2020-05-11

## 2020-02-27 RX ORDER — ONDANSETRON 2 MG/ML
8 INJECTION INTRAMUSCULAR; INTRAVENOUS AS NEEDED
Status: CANCELLED | OUTPATIENT
Start: 2020-05-11

## 2020-02-27 RX ORDER — HYDROCORTISONE SODIUM SUCCINATE 100 MG/2ML
100 INJECTION, POWDER, FOR SOLUTION INTRAMUSCULAR; INTRAVENOUS AS NEEDED
Status: CANCELLED | OUTPATIENT
Start: 2020-07-13

## 2020-02-27 RX ORDER — HEPARIN 100 UNIT/ML
300-500 SYRINGE INTRAVENOUS AS NEEDED
Status: CANCELLED | OUTPATIENT
Start: 2020-03-18

## 2020-02-27 RX ORDER — SODIUM CHLORIDE 0.9 % (FLUSH) 0.9 %
10 SYRINGE (ML) INJECTION AS NEEDED
Status: CANCELLED
Start: 2020-06-29

## 2020-02-27 RX ORDER — SODIUM CHLORIDE 0.9 % (FLUSH) 0.9 %
10 SYRINGE (ML) INJECTION AS NEEDED
Status: CANCELLED
Start: 2020-03-30

## 2020-02-27 RX ORDER — DEXTROSE MONOHYDRATE 50 MG/ML
25 INJECTION, SOLUTION INTRAVENOUS CONTINUOUS
Status: CANCELLED | OUTPATIENT
Start: 2020-06-01

## 2020-02-27 RX ORDER — ONDANSETRON 2 MG/ML
8 INJECTION INTRAMUSCULAR; INTRAVENOUS AS NEEDED
Status: CANCELLED | OUTPATIENT
Start: 2020-03-16

## 2020-02-27 RX ORDER — SODIUM CHLORIDE 0.9 % (FLUSH) 0.9 %
10 SYRINGE (ML) INJECTION AS NEEDED
Status: CANCELLED | OUTPATIENT
Start: 2020-04-13

## 2020-02-27 RX ORDER — SODIUM CHLORIDE 9 MG/ML
10 INJECTION INTRAMUSCULAR; INTRAVENOUS; SUBCUTANEOUS AS NEEDED
Status: CANCELLED | OUTPATIENT
Start: 2020-06-17

## 2020-02-27 RX ORDER — ATROPINE SULFATE 0.4 MG/ML
0.4 INJECTION, SOLUTION ENDOTRACHEAL; INTRAMEDULLARY; INTRAMUSCULAR; INTRAVENOUS; SUBCUTANEOUS ONCE
Status: CANCELLED | OUTPATIENT
Start: 2020-06-29

## 2020-02-27 RX ORDER — ACETAMINOPHEN 325 MG/1
650 TABLET ORAL AS NEEDED
Status: CANCELLED | OUTPATIENT
Start: 2020-05-11

## 2020-02-27 RX ORDER — HYDROCORTISONE SODIUM SUCCINATE 100 MG/2ML
100 INJECTION, POWDER, FOR SOLUTION INTRAMUSCULAR; INTRAVENOUS AS NEEDED
Status: CANCELLED | OUTPATIENT
Start: 2020-03-16

## 2020-02-27 RX ORDER — DIPHENHYDRAMINE HYDROCHLORIDE 50 MG/ML
50 INJECTION, SOLUTION INTRAMUSCULAR; INTRAVENOUS AS NEEDED
Status: CANCELLED | OUTPATIENT
Start: 2020-07-13

## 2020-02-27 RX ORDER — ATROPINE SULFATE 0.4 MG/ML
0.4 INJECTION, SOLUTION ENDOTRACHEAL; INTRAMEDULLARY; INTRAMUSCULAR; INTRAVENOUS; SUBCUTANEOUS
Status: CANCELLED | OUTPATIENT
Start: 2020-07-13

## 2020-02-27 RX ORDER — SODIUM CHLORIDE 0.9 % (FLUSH) 0.9 %
10 SYRINGE (ML) INJECTION AS NEEDED
Status: CANCELLED | OUTPATIENT
Start: 2020-06-29

## 2020-02-27 RX ORDER — SODIUM CHLORIDE 9 MG/ML
10 INJECTION INTRAMUSCULAR; INTRAVENOUS; SUBCUTANEOUS AS NEEDED
Status: CANCELLED | OUTPATIENT
Start: 2020-07-15

## 2020-02-27 RX ORDER — ALBUTEROL SULFATE 0.83 MG/ML
2.5 SOLUTION RESPIRATORY (INHALATION) AS NEEDED
Status: CANCELLED
Start: 2020-03-16

## 2020-02-27 RX ORDER — EPINEPHRINE 1 MG/ML
0.3 INJECTION, SOLUTION, CONCENTRATE INTRAVENOUS AS NEEDED
Status: CANCELLED | OUTPATIENT
Start: 2020-06-15

## 2020-02-27 RX ORDER — HEPARIN 100 UNIT/ML
300-500 SYRINGE INTRAVENOUS AS NEEDED
Status: CANCELLED | OUTPATIENT
Start: 2020-07-01

## 2020-02-27 RX ORDER — DIPHENHYDRAMINE HYDROCHLORIDE 50 MG/ML
50 INJECTION, SOLUTION INTRAMUSCULAR; INTRAVENOUS AS NEEDED
Status: CANCELLED
Start: 2020-03-30

## 2020-02-27 RX ORDER — EPINEPHRINE 1 MG/ML
0.3 INJECTION, SOLUTION, CONCENTRATE INTRAVENOUS AS NEEDED
Status: CANCELLED | OUTPATIENT
Start: 2020-04-13

## 2020-02-27 RX ORDER — ALBUTEROL SULFATE 0.83 MG/ML
2.5 SOLUTION RESPIRATORY (INHALATION) AS NEEDED
Status: CANCELLED
Start: 2020-07-13

## 2020-02-27 RX ORDER — PALONOSETRON 0.05 MG/ML
0.25 INJECTION, SOLUTION INTRAVENOUS ONCE
Status: CANCELLED
Start: 2020-06-29

## 2020-02-27 RX ORDER — ATROPINE SULFATE 0.4 MG/ML
0.4 INJECTION, SOLUTION ENDOTRACHEAL; INTRAMEDULLARY; INTRAMUSCULAR; INTRAVENOUS; SUBCUTANEOUS ONCE
Status: CANCELLED | OUTPATIENT
Start: 2020-04-13

## 2020-02-27 RX ORDER — FLUOROURACIL 50 MG/ML
300 INJECTION, SOLUTION INTRAVENOUS ONCE
Status: CANCELLED | OUTPATIENT
Start: 2020-06-29 | End: 2020-06-29

## 2020-02-27 RX ORDER — HEPARIN 100 UNIT/ML
300-500 SYRINGE INTRAVENOUS AS NEEDED
Status: CANCELLED | OUTPATIENT
Start: 2020-06-01

## 2020-02-27 RX ORDER — PALONOSETRON 0.05 MG/ML
0.25 INJECTION, SOLUTION INTRAVENOUS ONCE
Status: CANCELLED
Start: 2020-05-11

## 2020-02-27 RX ORDER — ONDANSETRON 2 MG/ML
8 INJECTION INTRAMUSCULAR; INTRAVENOUS AS NEEDED
Status: CANCELLED | OUTPATIENT
Start: 2020-06-29

## 2020-02-27 RX ORDER — SODIUM CHLORIDE 0.9 % (FLUSH) 0.9 %
10 SYRINGE (ML) INJECTION AS NEEDED
Status: CANCELLED
Start: 2020-04-13

## 2020-02-27 RX ORDER — ATROPINE SULFATE 0.4 MG/ML
0.4 INJECTION, SOLUTION ENDOTRACHEAL; INTRAMEDULLARY; INTRAMUSCULAR; INTRAVENOUS; SUBCUTANEOUS ONCE
Status: CANCELLED | OUTPATIENT
Start: 2020-05-11

## 2020-02-27 RX ORDER — HEPARIN 100 UNIT/ML
300-500 SYRINGE INTRAVENOUS AS NEEDED
Status: CANCELLED | OUTPATIENT
Start: 2020-03-16

## 2020-02-27 RX ORDER — PALONOSETRON 0.05 MG/ML
0.25 INJECTION, SOLUTION INTRAVENOUS ONCE
Status: CANCELLED
Start: 2020-07-13

## 2020-02-27 RX ORDER — ONDANSETRON 2 MG/ML
8 INJECTION INTRAMUSCULAR; INTRAVENOUS AS NEEDED
Status: CANCELLED | OUTPATIENT
Start: 2020-07-13

## 2020-02-27 RX ORDER — FLUOROURACIL 50 MG/ML
300 INJECTION, SOLUTION INTRAVENOUS ONCE
Status: CANCELLED | OUTPATIENT
Start: 2020-03-30 | End: 2020-03-30

## 2020-02-27 RX ORDER — EPINEPHRINE 1 MG/ML
0.3 INJECTION, SOLUTION, CONCENTRATE INTRAVENOUS AS NEEDED
Status: CANCELLED | OUTPATIENT
Start: 2020-07-13

## 2020-02-27 RX ORDER — PALONOSETRON 0.05 MG/ML
0.25 INJECTION, SOLUTION INTRAVENOUS ONCE
Status: CANCELLED
Start: 2020-04-13

## 2020-02-27 RX ORDER — EPINEPHRINE 1 MG/ML
0.3 INJECTION, SOLUTION, CONCENTRATE INTRAVENOUS AS NEEDED
Status: CANCELLED | OUTPATIENT
Start: 2020-03-16

## 2020-02-27 RX ORDER — SODIUM CHLORIDE 0.9 % (FLUSH) 0.9 %
10 SYRINGE (ML) INJECTION AS NEEDED
Status: CANCELLED | OUTPATIENT
Start: 2020-05-11

## 2020-02-27 RX ORDER — HYDROCORTISONE SODIUM SUCCINATE 100 MG/2ML
100 INJECTION, POWDER, FOR SOLUTION INTRAMUSCULAR; INTRAVENOUS AS NEEDED
Status: CANCELLED | OUTPATIENT
Start: 2020-06-15

## 2020-02-27 RX ORDER — DIPHENHYDRAMINE HYDROCHLORIDE 50 MG/ML
50 INJECTION, SOLUTION INTRAMUSCULAR; INTRAVENOUS AS NEEDED
Status: CANCELLED
Start: 2020-04-13

## 2020-02-27 RX ORDER — PALONOSETRON 0.05 MG/ML
0.25 INJECTION, SOLUTION INTRAVENOUS ONCE
Status: CANCELLED
Start: 2020-03-16

## 2020-02-27 RX ORDER — ONDANSETRON 2 MG/ML
8 INJECTION INTRAMUSCULAR; INTRAVENOUS AS NEEDED
Status: CANCELLED | OUTPATIENT
Start: 2020-06-01

## 2020-02-27 RX ORDER — PALONOSETRON 0.05 MG/ML
0.25 INJECTION, SOLUTION INTRAVENOUS ONCE
Status: CANCELLED
Start: 2020-03-30

## 2020-02-27 RX ORDER — DIPHENHYDRAMINE HYDROCHLORIDE 50 MG/ML
50 INJECTION, SOLUTION INTRAMUSCULAR; INTRAVENOUS AS NEEDED
Status: CANCELLED | OUTPATIENT
Start: 2020-04-13

## 2020-02-27 RX ORDER — SODIUM CHLORIDE 0.9 % (FLUSH) 0.9 %
10 SYRINGE (ML) INJECTION AS NEEDED
Status: CANCELLED
Start: 2020-07-01

## 2020-02-27 RX ORDER — SODIUM CHLORIDE 9 MG/ML
10 INJECTION INTRAMUSCULAR; INTRAVENOUS; SUBCUTANEOUS AS NEEDED
Status: CANCELLED | OUTPATIENT
Start: 2020-04-01

## 2020-02-27 RX ORDER — ONDANSETRON 2 MG/ML
8 INJECTION INTRAMUSCULAR; INTRAVENOUS AS NEEDED
Status: CANCELLED | OUTPATIENT
Start: 2020-03-30

## 2020-02-27 RX ORDER — ATROPINE SULFATE 0.4 MG/ML
0.4 INJECTION, SOLUTION ENDOTRACHEAL; INTRAMEDULLARY; INTRAMUSCULAR; INTRAVENOUS; SUBCUTANEOUS
Status: CANCELLED | OUTPATIENT
Start: 2020-06-01

## 2020-02-27 RX ORDER — ATROPINE SULFATE 0.4 MG/ML
0.4 INJECTION, SOLUTION ENDOTRACHEAL; INTRAMEDULLARY; INTRAMUSCULAR; INTRAVENOUS; SUBCUTANEOUS ONCE
Status: CANCELLED | OUTPATIENT
Start: 2020-07-13

## 2020-02-27 RX ORDER — HEPARIN 100 UNIT/ML
300-500 SYRINGE INTRAVENOUS AS NEEDED
Status: CANCELLED | OUTPATIENT
Start: 2020-04-01

## 2020-02-27 RX ORDER — DIPHENHYDRAMINE HYDROCHLORIDE 50 MG/ML
50 INJECTION, SOLUTION INTRAMUSCULAR; INTRAVENOUS AS NEEDED
Status: CANCELLED | OUTPATIENT
Start: 2020-06-15

## 2020-02-27 RX ORDER — HEPARIN 100 UNIT/ML
300-500 SYRINGE INTRAVENOUS AS NEEDED
Status: CANCELLED | OUTPATIENT
Start: 2020-05-13

## 2020-02-27 RX ORDER — HEPARIN 100 UNIT/ML
300-500 SYRINGE INTRAVENOUS AS NEEDED
Status: CANCELLED | OUTPATIENT
Start: 2020-07-13

## 2020-02-27 RX ORDER — SODIUM CHLORIDE 0.9 % (FLUSH) 0.9 %
10 SYRINGE (ML) INJECTION AS NEEDED
Status: CANCELLED | OUTPATIENT
Start: 2020-03-16

## 2020-02-27 RX ORDER — ALBUTEROL SULFATE 0.83 MG/ML
2.5 SOLUTION RESPIRATORY (INHALATION) AS NEEDED
Status: CANCELLED
Start: 2020-06-01

## 2020-02-27 RX ORDER — FLUOROURACIL 50 MG/ML
300 INJECTION, SOLUTION INTRAVENOUS ONCE
Status: CANCELLED | OUTPATIENT
Start: 2020-05-11 | End: 2020-05-11

## 2020-02-27 RX ORDER — SODIUM CHLORIDE 0.9 % (FLUSH) 0.9 %
10 SYRINGE (ML) INJECTION AS NEEDED
Status: CANCELLED
Start: 2020-06-03

## 2020-02-27 RX ORDER — ALBUTEROL SULFATE 0.83 MG/ML
2.5 SOLUTION RESPIRATORY (INHALATION) AS NEEDED
Status: CANCELLED
Start: 2020-05-11

## 2020-02-27 RX ORDER — SODIUM CHLORIDE 0.9 % (FLUSH) 0.9 %
10 SYRINGE (ML) INJECTION AS NEEDED
Status: CANCELLED
Start: 2020-07-15

## 2020-02-27 RX ORDER — ATROPINE SULFATE 0.4 MG/ML
0.4 INJECTION, SOLUTION ENDOTRACHEAL; INTRAMEDULLARY; INTRAMUSCULAR; INTRAVENOUS; SUBCUTANEOUS
Status: CANCELLED | OUTPATIENT
Start: 2020-03-16

## 2020-02-27 RX ORDER — HEPARIN 100 UNIT/ML
300-500 SYRINGE INTRAVENOUS AS NEEDED
Status: CANCELLED | OUTPATIENT
Start: 2020-04-15

## 2020-02-27 RX ORDER — DEXTROSE MONOHYDRATE 50 MG/ML
25 INJECTION, SOLUTION INTRAVENOUS CONTINUOUS
Status: CANCELLED | OUTPATIENT
Start: 2020-03-30

## 2020-02-27 RX ORDER — HYDROCORTISONE SODIUM SUCCINATE 100 MG/2ML
100 INJECTION, POWDER, FOR SOLUTION INTRAMUSCULAR; INTRAVENOUS AS NEEDED
Status: CANCELLED | OUTPATIENT
Start: 2020-03-30

## 2020-02-27 RX ORDER — SODIUM CHLORIDE 9 MG/ML
10 INJECTION INTRAMUSCULAR; INTRAVENOUS; SUBCUTANEOUS AS NEEDED
Status: CANCELLED | OUTPATIENT
Start: 2020-03-18

## 2020-02-27 RX ORDER — SODIUM CHLORIDE 0.9 % (FLUSH) 0.9 %
10 SYRINGE (ML) INJECTION AS NEEDED
Status: CANCELLED
Start: 2020-03-18

## 2020-02-27 RX ORDER — ACETAMINOPHEN 325 MG/1
650 TABLET ORAL AS NEEDED
Status: CANCELLED | OUTPATIENT
Start: 2020-03-30

## 2020-02-27 RX ORDER — DIPHENHYDRAMINE HYDROCHLORIDE 50 MG/ML
50 INJECTION, SOLUTION INTRAMUSCULAR; INTRAVENOUS AS NEEDED
Status: CANCELLED | OUTPATIENT
Start: 2020-06-29

## 2020-02-27 RX ORDER — DIPHENHYDRAMINE HYDROCHLORIDE 50 MG/ML
50 INJECTION, SOLUTION INTRAMUSCULAR; INTRAVENOUS AS NEEDED
Status: CANCELLED | OUTPATIENT
Start: 2020-03-16

## 2020-02-27 RX ORDER — DIPHENHYDRAMINE HYDROCHLORIDE 50 MG/ML
50 INJECTION, SOLUTION INTRAMUSCULAR; INTRAVENOUS AS NEEDED
Status: CANCELLED | OUTPATIENT
Start: 2020-03-30

## 2020-02-27 RX ORDER — HYDROCORTISONE SODIUM SUCCINATE 100 MG/2ML
100 INJECTION, POWDER, FOR SOLUTION INTRAMUSCULAR; INTRAVENOUS AS NEEDED
Status: CANCELLED | OUTPATIENT
Start: 2020-06-29

## 2020-02-27 RX ORDER — ATROPINE SULFATE 0.4 MG/ML
0.4 INJECTION, SOLUTION ENDOTRACHEAL; INTRAMEDULLARY; INTRAMUSCULAR; INTRAVENOUS; SUBCUTANEOUS
Status: CANCELLED | OUTPATIENT
Start: 2020-06-15

## 2020-02-27 RX ORDER — HEPARIN 100 UNIT/ML
300-500 SYRINGE INTRAVENOUS AS NEEDED
Status: CANCELLED | OUTPATIENT
Start: 2020-03-30

## 2020-02-27 RX ORDER — HEPARIN 100 UNIT/ML
300-500 SYRINGE INTRAVENOUS AS NEEDED
Status: CANCELLED | OUTPATIENT
Start: 2020-06-17

## 2020-02-27 RX ORDER — HYDROCORTISONE SODIUM SUCCINATE 100 MG/2ML
100 INJECTION, POWDER, FOR SOLUTION INTRAMUSCULAR; INTRAVENOUS AS NEEDED
Status: CANCELLED | OUTPATIENT
Start: 2020-06-01

## 2020-02-27 RX ORDER — SODIUM CHLORIDE 0.9 % (FLUSH) 0.9 %
10 SYRINGE (ML) INJECTION AS NEEDED
Status: CANCELLED
Start: 2020-04-15

## 2020-02-27 RX ORDER — SODIUM CHLORIDE 0.9 % (FLUSH) 0.9 %
10 SYRINGE (ML) INJECTION AS NEEDED
Status: CANCELLED
Start: 2020-05-13

## 2020-02-27 RX ORDER — SODIUM CHLORIDE 0.9 % (FLUSH) 0.9 %
10 SYRINGE (ML) INJECTION AS NEEDED
Status: CANCELLED | OUTPATIENT
Start: 2020-06-01

## 2020-02-27 RX ORDER — SODIUM CHLORIDE 0.9 % (FLUSH) 0.9 %
10 SYRINGE (ML) INJECTION AS NEEDED
Status: CANCELLED | OUTPATIENT
Start: 2020-06-15

## 2020-02-27 RX ORDER — ATROPINE SULFATE 0.4 MG/ML
0.4 INJECTION, SOLUTION ENDOTRACHEAL; INTRAMEDULLARY; INTRAMUSCULAR; INTRAVENOUS; SUBCUTANEOUS ONCE
Status: CANCELLED | OUTPATIENT
Start: 2020-03-30

## 2020-02-27 RX ORDER — ACETAMINOPHEN 325 MG/1
650 TABLET ORAL AS NEEDED
Status: CANCELLED | OUTPATIENT
Start: 2020-03-16

## 2020-02-27 RX ORDER — SODIUM CHLORIDE 9 MG/ML
10 INJECTION INTRAMUSCULAR; INTRAVENOUS; SUBCUTANEOUS AS NEEDED
Status: CANCELLED | OUTPATIENT
Start: 2020-07-01

## 2020-02-27 RX ORDER — DIPHENHYDRAMINE HYDROCHLORIDE 50 MG/ML
50 INJECTION, SOLUTION INTRAMUSCULAR; INTRAVENOUS AS NEEDED
Status: CANCELLED
Start: 2020-07-13

## 2020-02-27 RX ORDER — HYDROCORTISONE SODIUM SUCCINATE 100 MG/2ML
100 INJECTION, POWDER, FOR SOLUTION INTRAMUSCULAR; INTRAVENOUS AS NEEDED
Status: CANCELLED | OUTPATIENT
Start: 2020-04-13

## 2020-02-27 RX ORDER — ACETAMINOPHEN 325 MG/1
650 TABLET ORAL AS NEEDED
Status: CANCELLED | OUTPATIENT
Start: 2020-07-13

## 2020-02-27 RX ORDER — SODIUM CHLORIDE 0.9 % (FLUSH) 0.9 %
10 SYRINGE (ML) INJECTION AS NEEDED
Status: CANCELLED
Start: 2020-04-01

## 2020-02-27 RX ORDER — ACETAMINOPHEN 325 MG/1
650 TABLET ORAL AS NEEDED
Status: CANCELLED | OUTPATIENT
Start: 2020-04-13

## 2020-02-27 RX ORDER — DEXTROSE MONOHYDRATE 50 MG/ML
25 INJECTION, SOLUTION INTRAVENOUS CONTINUOUS
Status: CANCELLED | OUTPATIENT
Start: 2020-06-29

## 2020-02-27 RX ORDER — DEXTROSE MONOHYDRATE 50 MG/ML
25 INJECTION, SOLUTION INTRAVENOUS CONTINUOUS
Status: CANCELLED | OUTPATIENT
Start: 2020-07-13

## 2020-02-27 RX ORDER — ALBUTEROL SULFATE 0.83 MG/ML
2.5 SOLUTION RESPIRATORY (INHALATION) AS NEEDED
Status: CANCELLED
Start: 2020-06-15

## 2020-02-27 RX ORDER — SODIUM CHLORIDE 9 MG/ML
10 INJECTION INTRAMUSCULAR; INTRAVENOUS; SUBCUTANEOUS AS NEEDED
Status: CANCELLED | OUTPATIENT
Start: 2020-04-15

## 2020-02-27 RX ORDER — SODIUM CHLORIDE 0.9 % (FLUSH) 0.9 %
10 SYRINGE (ML) INJECTION AS NEEDED
Status: CANCELLED
Start: 2020-06-15

## 2020-02-27 RX ORDER — EPINEPHRINE 1 MG/ML
0.3 INJECTION, SOLUTION, CONCENTRATE INTRAVENOUS AS NEEDED
Status: CANCELLED | OUTPATIENT
Start: 2020-06-29

## 2020-02-27 RX ORDER — DEXTROSE MONOHYDRATE 50 MG/ML
25 INJECTION, SOLUTION INTRAVENOUS CONTINUOUS
Status: CANCELLED | OUTPATIENT
Start: 2020-04-13

## 2020-02-27 RX ORDER — DIPHENHYDRAMINE HYDROCHLORIDE 50 MG/ML
50 INJECTION, SOLUTION INTRAMUSCULAR; INTRAVENOUS AS NEEDED
Status: CANCELLED
Start: 2020-06-15

## 2020-02-27 RX ORDER — PALONOSETRON 0.05 MG/ML
0.25 INJECTION, SOLUTION INTRAVENOUS ONCE
Status: CANCELLED
Start: 2020-06-15

## 2020-02-27 RX ORDER — DIPHENHYDRAMINE HYDROCHLORIDE 50 MG/ML
50 INJECTION, SOLUTION INTRAMUSCULAR; INTRAVENOUS AS NEEDED
Status: CANCELLED | OUTPATIENT
Start: 2020-05-11

## 2020-02-27 RX ORDER — ATROPINE SULFATE 0.4 MG/ML
0.4 INJECTION, SOLUTION ENDOTRACHEAL; INTRAMEDULLARY; INTRAMUSCULAR; INTRAVENOUS; SUBCUTANEOUS
Status: CANCELLED | OUTPATIENT
Start: 2020-06-29

## 2020-02-27 RX ORDER — ATROPINE SULFATE 0.4 MG/ML
0.4 INJECTION, SOLUTION ENDOTRACHEAL; INTRAMEDULLARY; INTRAMUSCULAR; INTRAVENOUS; SUBCUTANEOUS ONCE
Status: CANCELLED | OUTPATIENT
Start: 2020-06-01

## 2020-02-27 RX ORDER — SODIUM CHLORIDE 0.9 % (FLUSH) 0.9 %
10 SYRINGE (ML) INJECTION AS NEEDED
Status: CANCELLED
Start: 2020-03-16

## 2020-03-31 ENCOUNTER — TELEPHONE (OUTPATIENT)
Dept: ONCOLOGY | Age: 80
End: 2020-03-31

## 2020-04-01 ENCOUNTER — OFFICE VISIT (OUTPATIENT)
Dept: ONCOLOGY | Age: 80
End: 2020-04-01

## 2020-04-01 VITALS
HEART RATE: 69 BPM | OXYGEN SATURATION: 93 % | RESPIRATION RATE: 16 BRPM | DIASTOLIC BLOOD PRESSURE: 66 MMHG | WEIGHT: 183.6 LBS | SYSTOLIC BLOOD PRESSURE: 108 MMHG | HEIGHT: 73 IN | BODY MASS INDEX: 24.33 KG/M2 | TEMPERATURE: 98.1 F

## 2020-04-01 DIAGNOSIS — G89.3 NEOPLASM RELATED PAIN: Primary | ICD-10-CM

## 2020-04-01 RX ORDER — OXYCODONE AND ACETAMINOPHEN 5; 325 MG/1; MG/1
2 TABLET ORAL
Qty: 60 TAB | Refills: 0 | Status: SHIPPED | OUTPATIENT
Start: 2020-04-01 | End: 2020-04-08

## 2020-04-01 NOTE — PROGRESS NOTES
Reason for Visit:   Ness Oscar [facility] is seen for follow up pancreatic cancer     Treatment History:   Dx: Metastatic Pancreatic Adenocarcinoma--Nov 2018--peritoneal mets  Tx: FOLFIRINOX--first cycle 2/4/19, second cycle 2/18/19, third cycle 3/4/19, fourth cycle on 3/18/19, 5th on 4/1/19, 6th 4/15/2019, 7th 4/29/19, 8th 5/13/19, 9th 6/5/19 (dose reduced due to toxicity), 10th 6/24/19, 11th 7/5/19, 12th 7/22/2019, 13th 8/5/2019, 14th 8/19/2019, 15th 8/9/2019, 16th 9/3/2019, 17th 9/23/2019, 18th 10/7/2019, 19 10/21/2019, 20 11/4/2019, 21 11/18/2019, 22 12/2/2019 (stopped the [address] [place]), 23 12/16/2019 (no Oxali), 24 1/6/2020 (no Oxali), 25 1/20/2020 (no Oxali), 26 2/3/2020 (no Oxali), 27 2/17/2020 (no Oxali), 28 3/2/2020 (no Oxali), 29 3/16/2020 (no Oxali), 30 3/30/2020 (no Oxali). Goal: prolong survival, Cycle length: until progression, Will return to see me prior to each cycle    History of Present Illness:   Doing very well overall, some increased pain in hips--been unable to get the injections due to virus changes. Neuropathy is better since increase nortriptyline. Getting pump off today, discussed the continued fall of 19-9. Using oxycodone/apap for pain and its helping. New CHIF--working with cardiology--is asymptomatic--they just increased his b-blocker.     Past Medical History:   Diagnosis Date    CAD (coronary artery disease)     3 cardiac stents   heart Dr. Tera Xie Cervical spondylosis without myelopathy 2009    Chronic pain     shoulder left, upper lumbar    Depressive disorder, not elsewhere classified     Diaphragmatic hernia without mention of obstruction or gangrene     GERD (gastroesophageal reflux disease)     Hypercholesterolemia     Hypertension     Hypertrophy of prostate without urinary obstruction and other lower urinary tract symptoms (LUTS) 2008    Insomnia, unspecified 2009    Lumbosacral spondylosis without myelopathy 2009    Osteoarthrosis involving, or with mention of more than one site, but not specified as generalized, multiple sites 2010    Varicose veins 2014      Past Surgical History:   Procedure Laterality Date    CABG, ARTERY-VEIN, TWO  12    CABG    HX APPENDECTOMY      HX CATARACT REMOVAL      bilateral    HX COLONOSCOPY  2010    x2 small beign polyps    HX COLONOSCOPY      hyperplastic polyp    HX ENDOSCOPY  2010    acid reflux    [place],     3 stents    HX HEENT      fx nose repair    HX HERNIA REPAIR  early     left inguinal, with mesh    HX ORTHOPAEDIC  2017    lumbar fusion    HX OTHER SURGICAL  7 yrs. old    Hiatal Hernia    HX TONSILLECTOMY      HX VASCULAR ACCESS      IR INSERT TUNL CVAD W PORT LESS THAN 5 YR  2019      Social History     Tobacco Use    Smoking status: Former Smoker     Packs/day: 1.00     Years: 10.00     Pack years: 10.00     Types: Cigarettes     Start date: 1961     Last attempt to quit: 1971     Years since quittin.1    Smokeless tobacco: Never Used   Substance Use Topics    Alcohol use: No     Frequency: Never     Binge frequency: Never      Family History   Problem Relation Age of Onset    Hypertension Father     Heart Disease Father     Arthritis-osteo Mother     Cancer Brother 78        tumor in back    Heart Disease Brother     Diabetes Maternal Grandfather     Kidney Disease Maternal Grandfather      Current Outpatient Medications   Medication Sig    oxyCODONE-acetaminophen (Percocet) 5-325 mg per tablet Take  by mouth every four (4) hours as needed for Pain. Patient is taking 2 tab in AM which is helping    nortriptyline (PAMELOR) 25 mg capsule Take 2 Caps by mouth nightly. (Patient taking differently: Take 25 mg by mouth nightly.)    DULoxetine (CYMBALTA) 60 mg capsule Take 1 Cap by mouth daily.  Indications: Chronic Muscle or Bone Pain    rosuvastatin (CRESTOR) 10 mg tablet Take 1 Tab by mouth nightly. Indications: excessive fat in the blood    rivaroxaban (XARELTO) 20 mg tab tablet Take 1 Tab by mouth daily (with breakfast). Indications: a clot in the lung    omeprazole (PRILOSEC) 20 mg capsule Take 20 mg by mouth daily.  polyethylene glycol (MIRALAX) 17 gram/dose powder Take 17 g by mouth daily as needed.  METOPROLOL TARTRATE PO Take 25 mg by mouth two (2) times a day.  multivitamin (ONE A DAY) tablet Take 1 Tab by mouth daily.  aspirin 81 mg tablet Take 81 mg by mouth.  ibuprofen (MOTRIN) 200 mg tablet Take 400 mg by mouth daily as needed for Pain.  tamsulosin (FLOMAX) 0.4 mg capsule TAKE 2 CAPSULES BY MOUTH NIGHLTY    ondansetron (ZOFRAN ODT) 4 mg disintegrating tablet Take 1 Tab by mouth every eight (8) hours as needed for Nausea. Indications: Nausea and Vomiting caused by Cancer Drugs    dexAMETHasone (DECADRON) 4 mg tablet Take 8 mg by mouth daily. Indications: Prevent Nausea and Vomiting from Cancer Chemotherapy    acetaminophen (TYLENOL EXTRA STRENGTH) 500 mg tablet Take 500 mg by mouth every six (6) hours as needed for Pain.  albuterol (VENTOLIN HFA) 90 mcg/actuation inhaler Take 2 Puffs by inhalation every four (4) hours as needed for Wheezing.  LORazepam (ATIVAN) 0.5 mg tablet Take 1 Tab by mouth every four (4) hours as needed for Anxiety. Max Daily Amount: 3 mg. Indications: Nausea and Vomiting caused by Cancer Drugs    amLODIPine (NORVASC) 5 mg tablet Take 5 mg by mouth daily.  dutasteride (AVODART) 0.5 mg capsule Take 0.5 mg by mouth daily. No current facility-administered medications for this visit. No Known Allergies     Review of Systems: A complete review of systems was obtained, negative except as described above.     Physical Exam:     Visit Vitals  /66   Pulse 69   Temp 98.1 °F (36.7 °C) (Oral)   Resp 16   Ht 6' 1\" (1.854 m)   Wt 183 lb 9.6 oz (83.3 kg)   SpO2 93%   BMI 24.22 kg/m²     ECOG PS: 1  General: No distress  Eyes: PERRLA, anicteric sclerae  HENT: Atraumatic, OP clear  Neck: Supple  Lymphatic: No cervical, supraclavicular, or inguinal adenopathy  Respiratory: CTAB, normal respiratory effort  CV: Normal rate, regular rhythm, no murmurs, no peripheral edema  GI: Soft, nontender, nondistended, no masses, no hepatomegaly, no splenomegaly  MS: Normal gait and station. Digits without clubbing or cyanosis. Skin: No rashes, ecchymoses, or petechiae. Normal temperature, turgor, and texture. Psych: Alert, oriented, appropriate affect, normal judgment/insight    Results:     Lab Results   Component Value Date/Time    WBC 4.1 03/30/2020 09:10 AM    HGB 12.1 03/30/2020 09:10 AM    HCT 36.6 03/30/2020 09:10 AM    PLATELET 103 13/01/0387 09:10 AM    .7 (H) 03/30/2020 09:10 AM    ABS. NEUTROPHILS 2.3 03/30/2020 09:10 AM     Lab Results   Component Value Date/Time    Sodium 141 03/30/2020 09:10 AM    Potassium 4.2 03/30/2020 09:10 AM    Chloride 106 03/30/2020 09:10 AM    CO2 28 03/30/2020 09:10 AM    Glucose 104 (H) 03/30/2020 09:10 AM    BUN 13 03/30/2020 09:10 AM    Creatinine 1.03 03/30/2020 09:10 AM    GFR est AA >60 03/30/2020 09:10 AM    GFR est non-AA >60 03/30/2020 09:10 AM    Calcium 9.0 03/30/2020 09:10 AM    Glucose (POC) 112 (H) 01/12/2012 06:32 AM     Lab Results   Component Value Date/Time    Bilirubin, total 0.3 03/30/2020 09:10 AM    ALT (SGPT) 26 03/30/2020 09:10 AM    AST (SGOT) 21 03/30/2020 09:10 AM    Alk. phosphatase 60 03/30/2020 09:10 AM    Protein, total 6.4 03/30/2020 09:10 AM    Albumin 3.7 03/30/2020 09:10 AM    Globulin 2.7 03/30/2020 09:10 AM       CT C/A/P 4/5/2019  Resolved pulmonary emboli possible persistent right femoral vein DVT. Positive carcinomatosis with interval decrease in amount of ascites. Incidental cholelithiasis, lower lumbar fusion and hepatic cysts     CT C/A/P 6/21/2019  1. Mild interval decrease in size of pancreatic head/neck mass. 2. Stable peritoneal carcinomatosis.  New pocket of loculated ascites underneath  the right hemidiaphragm measuring 3.3 x 2.7 x 2.3 cm.  3. No evidence of metastatic disease in the chest.  4. Cholelithiasis      CT C/A/P 10/11/2019  IMPRESSION:  1. Further decrease in the size of the previously described small pancreatic  head mass lesion. 2. Continued evidence of focal nodular lesions in the anterior aspect of the  peritoneum compatible with carcinomatosis. No evidence of para-aortic  lymphadenopathy. 3. No focal intrathoracic nodules/mass lesions identified. No evidence of  mediastinal or hilar lymphadenopathy. 4. Persistence of the previously described focal, loculated collection of  ascites in the region of the right hemidiaphragm. 5. Normal sized liver. Persistence of the previously described multiple focal  low-density areas within liver suggestive of cysts. 6. Normal sized spleen. No focal splenic lesions identified. 7. Normal appearance of the kidneys. 8. Suboptimal distention of the urinary bladder (see discussion above) disease. 9. Relative enlargement of the prostate gland. CT C/A/P 2/21/2020  IMPRESSION:  Stable small mass pancreatic neck. Unchanged peritoneal carcinomatosis and fluid  collection/peritoneal cystic lesion right subphrenic space. Stable hepatic  cysts. Cholelithiasis.  No new evidence to suggest metastatic disease.     Assessment:   1) Metastatic Pancreatic Carcinoma with Peritoneal Metastasis  2) Neoplasm Pain  3) PE  4) Back Pain  5) New CHF     Plan:   1) Continue with dose reduced FOLFIRINOX-Repeat Imaging shows its decreasing in size so has correlated with his CA 19-9 which continues to drop.  We have stopped the Oxaliplatin due to his worsening neuropathy--Will plan on repeating scans in 3-4 months and see me again in 6 weeks    2) Pain controlled much better since stopping the oxaliplatin--stopped the Fentanyl and oxycodone on his own, continue the nortriptyline at bedtime and that did seem to help neuropathy and sleep--will continue. Repeat 19-9 next cycle  3) Continue Xarelto 20mg daily  4) Likely due to OA/DJD as it hasn't improved with the improving pancreatic ca. 5) Following with Cardiology--med management--is not currently on cardiotoxic chemotherapy.       Signed By: Monroe Sin MD

## 2020-04-01 NOTE — PATIENT INSTRUCTIONS
Orthostatic Hypotension: Care Instructions Your Care Instructions Orthostatic hypotension is a quick drop in blood pressure. It happens when you get up from sitting or lying down. You may feel faint, lightheaded, or dizzy. When a person sits up or stands up, the body changes the way it pumps blood. This can slow the flow of blood to the brain for a very short time. And that can make you feel lightheaded. Many medicines can cause this problem, especially in older people. Lack of fluids (dehydration) or illnesses such as diabetes or heart disease also can cause it. Follow-up care is a key part of your treatment and safety. Be sure to make and go to all appointments, and call your doctor if you are having problems. It's also a good idea to know your test results and keep a list of the medicines you take. How can you care for yourself at home? · Be safe with medicines. Call your doctor if you think you are having a problem with your medicine. You will get more details on the specific medicines your doctor prescribes. · If you feel dizzy or lightheaded, sit down or lie down for a few minutes. Or you can sit down and put your head between your knees. This will help your blood pressure go back to normal and help your symptoms go away. · Follow your doctor's suggestions for ways to prevent symptoms like dizziness. These suggestions may include: ? Get up slowly from bed or after sitting for a long time. If you are in bed, roll to your side and swing your legs over the edge of the bed and onto the floor. Push your body up to a sitting position. Wait for a while before you slowly stand up. 
? Add more salt to your diet, if your doctor recommends it. ? Drink plenty of fluids, enough so that your urine is light yellow or clear like water. Choose water and other caffeine-free clear liquids.  If you have kidney, heart, or liver disease and have to limit fluids, talk with your doctor before you increase the amount of fluids you drink. ? Avoid or limit alcohol to 2 drinks a day for men and 1 drink a day for women. Alcohol may interfere with your medicine. In addition, alcohol can make your low blood pressure worse by causing your body to lose water. ? Avoid or limit caffeine. Caffeine can cause your body to lose water. ? Wear compression stockings to help improve blood flow. When should you call for help? Call 911 anytime you think you may need emergency care. For example, call if: 
  · You passed out (lost consciousness).  
 Watch closely for changes in your health, and be sure to contact your doctor if: 
  · You do not get better as expected. Where can you learn more? Go to http://zamzam-kun.info/ Enter K018 in the search box to learn more about \"Orthostatic Hypotension: Care Instructions. \" Current as of: December 15, 2019Content Version: 12.4 © 7100-5697 [employer]. Care instructions adapted under license by [employer] (which disclaims liability or warranty for this information). If you have questions about a medical condition or this instruction, always ask your healthcare professional. [place] [address] any warranty or liability for your use of this information.

## 2020-04-01 NOTE — PROGRESS NOTES
Jesus Deshpande a [de-identified] y. o. male who is seen for follow up pancreatic cancer. He is not getting his pain shot at this time, they have moved him out to May 2020. He has restarted the [facility] 2/325, he is taking 2 caps in the AM, this is helping. He feels this neuropathy in his hand is getting better, he can now button his own shirts, the neuropathy in his feet is about the same. Patient does have occasonial orthostatic hypotension s/s, \"woozy\" upon standing usually only in the morning. Chemotherapy Flowsheet 3/30/2020   Cycle C 29 D 1   Date 3/30/2020   Drug / Regimen Irinotecan/ Leucovorin    Dosage see MAR   Time Up -   Time Down -   Pre Hydration -   Pre Meds -   Notes CIV 5 FU pump     Key Pain Meds             oxyCODONE-acetaminophen (Percocet) 5-325 mg per tablet (Taking) Take  by mouth every four (4) hours as needed for Pain. Patient is taking 2 tab in AM which is helping    ibuprofen (MOTRIN) 200 mg tablet Take 400 mg by mouth daily as needed for Pain. acetaminophen (TYLENOL EXTRA STRENGTH) 500 mg tablet Take 500 mg by mouth every six (6) hours as needed for Pain. Key Oncology Meds             ondansetron (ZOFRAN ODT) 4 mg disintegrating tablet Take 1 Tab by mouth every eight (8) hours as needed for Nausea.  Indications: Nausea and Vomiting caused by Cancer Drugs        Last;  CT C/A/P 2/21/2020  NM Bone Scan 8/27/2019

## 2020-04-07 RX ORDER — DULOXETIN HYDROCHLORIDE 60 MG/1
CAPSULE, DELAYED RELEASE ORAL
Qty: 90 CAP | Refills: 1 | Status: SHIPPED | OUTPATIENT
Start: 2020-04-07 | End: 2020-09-15 | Stop reason: SDUPTHER

## 2020-04-23 ENCOUNTER — TELEPHONE (OUTPATIENT)
Dept: ONCOLOGY | Age: 80
End: 2020-04-23

## 2020-04-23 NOTE — TELEPHONE ENCOUNTER
HIPAA verified. Mr Antoni Broussard called, he has spoken with his cardiologist Dr Carlo Chan about increasing SOB, Chest Pain (especially when exercising/exertion) and increasing weakness. He states these symptom occurred with chemo and have been getting worse over the past 2-3 months. He wants to know if these can be related to his chemotherapy. He is suppose to call DR Carlo Chan back. This message sent through [employer] to DR [facility].

## 2020-04-23 NOTE — TELEPHONE ENCOUNTER
Spoke with Dr Adam Owen he would like for patient to have an ECHO, and to see DR Noemi Brown. Hold chemo until after patient see's DR Noemi Brown. HIPAA verified. Relayed message to Mr Ruthie Dawn holding chemo, ECHO to be ordered, Patient wants to have done where DR Noemi Brown works. Patient also to see DR Noemi Brown before resuming chemo. Patient verbalized understanding. He will contact DR Noemi Brown. Thanked for call.

## 2020-05-01 ENCOUNTER — TELEPHONE (OUTPATIENT)
Dept: ONCOLOGY | Age: 80
End: 2020-05-01

## 2020-05-01 NOTE — TELEPHONE ENCOUNTER
HIPAA verified. Mr Caterina Dad call states he has seen DR Alcazar Prudent with [facility] She has placed him on Ranolazine ER 500mg BID and increased his Metroprolol to 25mg BID. He says this is all due to the CP and SOB. He also said there are \"blockages\" but they don't want to put him in the hospital at this time so no stents at this time but maybe later. I don't see the report in care everywhere, Leah Madden can you get the OV notes from Dr Ronnie Hall?

## 2020-05-13 ENCOUNTER — OFFICE VISIT (OUTPATIENT)
Dept: ONCOLOGY | Age: 80
End: 2020-05-13

## 2020-05-13 VITALS
DIASTOLIC BLOOD PRESSURE: 72 MMHG | BODY MASS INDEX: 24.12 KG/M2 | TEMPERATURE: 98.5 F | OXYGEN SATURATION: 96 % | WEIGHT: 182 LBS | HEART RATE: 58 BPM | HEIGHT: 73 IN | SYSTOLIC BLOOD PRESSURE: 132 MMHG | RESPIRATION RATE: 20 BRPM

## 2020-05-13 DIAGNOSIS — G89.3 NEOPLASM RELATED PAIN: Primary | ICD-10-CM

## 2020-05-13 RX ORDER — RANOLAZINE 500 MG/1
500 TABLET, EXTENDED RELEASE ORAL 2 TIMES DAILY
COMMUNITY
Start: 2020-04-30

## 2020-05-13 RX ORDER — OXYCODONE AND ACETAMINOPHEN 5; 325 MG/1; MG/1
2 TABLET ORAL
Qty: 90 TAB | Refills: 0 | Status: SHIPPED | OUTPATIENT
Start: 2020-05-13 | End: 2020-05-25

## 2020-05-13 NOTE — PROGRESS NOTES
Jacky Han a [de-identified] y. o. male who is seen for follow up pancreatic cancer. He is not getting his pain shot at this time, they have moved him out to May 2020. Patient scheduled for pump to be disconnected in [facility] today. Patient denies complaints,  HR 58 Dr Kelly Figueroa notified. Last;  CT C/A/P 2/21/2020  NM Bone Scan 8/27/2019    Chemotherapy Flowsheet 5/11/2020   Cycle C 31 D 1   Date 5/11/2020   Drug / Regimen Irinotecan/Leucovorin   Dosage see MAR   Time Up -   Time Down -   Pre Hydration -   Pre Meds -   Notes CIV 5 FU      Key Oncology Meds             ondansetron (ZOFRAN ODT) 4 mg disintegrating tablet (Taking) Take 1 Tab by mouth every eight (8) hours as needed for Nausea. Indications: Nausea and Vomiting caused by Cancer Drugs        Key Pain Meds             oxyCODONE-acetaminophen (PERCOCET) 5-325 mg per tablet (Taking) Take 2 Tabs by mouth daily. ibuprofen (MOTRIN) 200 mg tablet (Taking) Take 400 mg by mouth daily as needed for Pain. acetaminophen (TYLENOL EXTRA STRENGTH) 500 mg tablet (Taking) Take 500 mg by mouth every six (6) hours as needed for Pain.

## 2020-05-13 NOTE — PROGRESS NOTES
Reason for Visit:   Sindy Mckeon [facility] is seen for follow up pancreatic cancer     Treatment History:   Dx: Metastatic Pancreatic Adenocarcinoma--Nov 2018--peritoneal mets  Tx: FOLFIRINOX--first cycle 2/4/19, second cycle 2/18/19, third cycle 3/4/19, fourth cycle on 3/18/19, 5th on 4/1/19, 6th 4/15/2019, 7th 4/29/19, 8th 5/13/19, 9th 6/5/19 (dose reduced due to toxicity), 10th 6/24/19, 11th 7/5/19, 12th 7/22/2019, 13th 8/5/2019, 14th 8/19/2019, 15th 8/9/2019, 16th 9/3/2019, 17th 9/23/2019, 18th 10/7/2019, 19 10/21/2019, 20 11/4/2019, 21 11/18/2019, 22 12/2/2019 (stopped the Ilana ), 23 12/16/2019 (no Oxali), 24 1/6/2020 (no Oxali), 25 1/20/2020 (no Oxali), 26 2/3/2020 (no Oxali), 27 2/17/2020 (no Oxali), 28 3/2/2020 (no Oxali), 29 3/16/2020 (no Oxali), 30 3/30/2020 (no Oxali), 31 4/13/2020 (no Oxali), 32 4/13/2020 (no Oxali), 33 5/11/2020 (no Oxali)  Goal: prolong survival, Cycle length: until progression, Will return to see me prior to each cycle    History of Present Illness:   Conitinues to do very well, seen by cards--added medication and dypsnea is a little better. May still do cath depending on improvement with meds. Continues tolerating chemotherapy very well. Back pain is stable.      Past Medical History:   Diagnosis Date    CAD (coronary artery disease)     3 cardiac stents   heart Dr. Aretha Carrel Cervical spondylosis without myelopathy 2009    Chronic pain     shoulder left, upper lumbar    Depressive disorder, not elsewhere classified     Diaphragmatic hernia without mention of obstruction or gangrene     GERD (gastroesophageal reflux disease)     Hypercholesterolemia     Hypertension     Hypertrophy of prostate without urinary obstruction and other lower urinary tract symptoms (LUTS) 2008    Insomnia, unspecified 2009    Lumbosacral spondylosis without myelopathy 2009    Osteoarthrosis involving, or with mention of more than one site, but not specified as generalized, multiple sites 2010    Varicose veins 2014      Past Surgical History:   Procedure Laterality Date    CABG, ARTERY-VEIN, TWO  12    CABG    HX APPENDECTOMY      HX CATARACT REMOVAL  2015    bilateral    HX COLONOSCOPY  2010    x2 small beign polyps    HX COLONOSCOPY  2017    hyperplastic polyp    HX ENDOSCOPY  2010    acid reflux    HX HEART CATHETERIZATION  ,     3 stents    HX HEENT      fx nose repair    HX HERNIA REPAIR  early     left inguinal, with mesh    HX ORTHOPAEDIC  2017    lumbar fusion    HX OTHER SURGICAL  7 yrs. old    Hiatal Hernia    HX TONSILLECTOMY      HX VASCULAR ACCESS      IR INSERT TUNL CVAD W PORT LESS THAN 5 YR  2019      Social History     Tobacco Use    Smoking status: Former Smoker     Packs/day: 1.00     Years: 10.00     Pack years: 10.00     Types: Cigarettes     Start date: 1961     Last attempt to quit: 1971     Years since quittin.3    Smokeless tobacco: Never Used   Substance Use Topics    Alcohol use: No     Frequency: Never     Binge frequency: Never      Family History   Problem Relation Age of Onset    Hypertension Father     Heart Disease Father     Arthritis-osteo Mother     Cancer Brother 78        tumor in back    Heart Disease Brother     Diabetes Maternal Grandfather     Kidney Disease Maternal Grandfather      Current Outpatient Medications   Medication Sig    oxyCODONE-acetaminophen (PERCOCET) 5-325 mg per tablet Take 2 Tabs by mouth daily.  DULoxetine (CYMBALTA) 60 mg capsule TAKE 1 CAPSULE BY MOUTH DAILY. INDICATIONS: CHRONIC MUSCLE OR BONE PAIN    ibuprofen (MOTRIN) 200 mg tablet Take 400 mg by mouth daily as needed for Pain.  nortriptyline (PAMELOR) 25 mg capsule Take 2 Caps by mouth nightly.  (Patient taking differently: Take 25 mg by mouth nightly.)    tamsulosin (FLOMAX) 0.4 mg capsule TAKE 2 CAPSULES BY MOUTH NIGHLTY    ondansetron (ZOFRAN ODT) 4 mg disintegrating tablet Take 1 Tab by mouth every eight (8) hours as needed for Nausea. Indications: Nausea and Vomiting caused by Cancer Drugs    dexAMETHasone (DECADRON) 4 mg tablet Take 8 mg by mouth daily. Indications: Prevent Nausea and Vomiting from Cancer Chemotherapy    rosuvastatin (CRESTOR) 10 mg tablet Take 1 Tab by mouth nightly. Indications: excessive fat in the blood    rivaroxaban (XARELTO) 20 mg tab tablet Take 1 Tab by mouth daily (with breakfast). Indications: a clot in the lung    acetaminophen (TYLENOL EXTRA STRENGTH) 500 mg tablet Take 500 mg by mouth every six (6) hours as needed for Pain.  albuterol (VENTOLIN HFA) 90 mcg/actuation inhaler Take 2 Puffs by inhalation every four (4) hours as needed for Wheezing.  LORazepam (ATIVAN) 0.5 mg tablet Take 1 Tab by mouth every four (4) hours as needed for Anxiety. Max Daily Amount: 3 mg. Indications: Nausea and Vomiting caused by Cancer Drugs    omeprazole (PRILOSEC) 20 mg capsule Take 20 mg by mouth daily.  polyethylene glycol (MIRALAX) 17 gram/dose powder Take 17 g by mouth daily as needed.  amLODIPine (NORVASC) 5 mg tablet Take 5 mg by mouth daily.  METOPROLOL TARTRATE PO Take 25 mg by mouth two (2) times a day.  multivitamin (ONE A DAY) tablet Take 1 Tab by mouth daily.  aspirin 81 mg tablet Take 81 mg by mouth.  dutasteride (AVODART) 0.5 mg capsule Take 0.5 mg by mouth daily. No current facility-administered medications for this visit.       Facility-Administered Medications Ordered in Other Visits   Medication Dose Route Frequency    0.9% sodium chloride injection 10 mL  10 mL IntraVENous PRN    heparin (porcine) pf 300-500 Units  300-500 Units InterCATHeter PRN    fluorouraciL (ADRUCIL) 3,546 mg, 0.9% sodium chloride 29.08 mL 100 mL CADD Cassette  1,800 mg/m2 (Treatment Plan Recorded) IntraVENous ONCE      No Known Allergies     Review of Systems: A complete review of systems was obtained, negative except as described above.    Physical Exam:   There were no vitals taken for this visit. ECOG PS: 1  General: No distress  Eyes: PERRLA, anicteric sclerae  HENT: Atraumatic, OP clear  Neck: Supple  Lymphatic: No cervical, supraclavicular, or inguinal adenopathy  Respiratory: CTAB, normal respiratory effort  CV: Normal rate, regular rhythm, no murmurs, no peripheral edema  GI: Soft, nontender, nondistended, no masses, no hepatomegaly, no splenomegaly  MS: Normal gait and station. Digits without clubbing or cyanosis. Skin: No rashes, ecchymoses, or petechiae. Normal temperature, turgor, and texture. Psych: Alert, oriented, appropriate affect, normal judgment/insight    Results:     Lab Results   Component Value Date/Time    WBC 4.1 05/11/2020 09:15 AM    HGB 11.2 (L) 05/11/2020 09:15 AM    HCT 33.8 (L) 05/11/2020 09:15 AM    PLATELET 757 76/37/6897 09:15 AM    .8 (H) 05/11/2020 09:15 AM    ABS. NEUTROPHILS 2.2 05/11/2020 09:15 AM     Lab Results   Component Value Date/Time    Sodium 137 05/11/2020 09:15 AM    Potassium 4.6 05/11/2020 09:15 AM    Chloride 102 05/11/2020 09:15 AM    CO2 26 05/11/2020 09:15 AM    Glucose 105 (H) 05/11/2020 09:15 AM    BUN 19 05/11/2020 09:15 AM    Creatinine 1.21 05/11/2020 09:15 AM    GFR est AA >60 05/11/2020 09:15 AM    GFR est non-AA 58 (L) 05/11/2020 09:15 AM    Calcium 8.4 (L) 05/11/2020 09:15 AM    Glucose (POC) 112 (H) 01/12/2012 06:32 AM     Lab Results   Component Value Date/Time    Bilirubin, total 0.2 05/11/2020 09:15 AM    ALT (SGPT) 18 05/11/2020 09:15 AM    AST (SGOT) 23 05/11/2020 09:15 AM    Alk. phosphatase 63 05/11/2020 09:15 AM    Protein, total 6.0 (L) 05/11/2020 09:15 AM    Albumin 3.4 (L) 05/11/2020 09:15 AM    Globulin 2.6 05/11/2020 09:15 AM       CT C/A/P 4/5/2019  Resolved pulmonary emboli possible persistent right femoral vein DVT. Positive carcinomatosis with interval decrease in amount of ascites.   Incidental cholelithiasis, lower lumbar fusion and hepatic cysts     CT C/A/P 6/21/2019  1. Mild interval decrease in size of pancreatic head/neck mass. 2. Stable peritoneal carcinomatosis. New pocket of loculated ascites underneath  the right hemidiaphragm measuring 3.3 x 2.7 x 2.3 cm.  3. No evidence of metastatic disease in the chest.  4. Cholelithiasis      CT C/A/P 10/11/2019  IMPRESSION:  1. Further decrease in the size of the previously described small pancreatic  head mass lesion. 2. Continued evidence of focal nodular lesions in the anterior aspect of the  peritoneum compatible with carcinomatosis. No evidence of para-aortic  lymphadenopathy. 3. No focal intrathoracic nodules/mass lesions identified. No evidence of  mediastinal or hilar lymphadenopathy. 4. Persistence of the previously described focal, loculated collection of  ascites in the region of the right hemidiaphragm. 5. Normal sized liver. Persistence of the previously described multiple focal  low-density areas within liver suggestive of cysts. 6. Normal sized spleen. No focal splenic lesions identified. 7. Normal appearance of the kidneys. 8. Suboptimal distention of the urinary bladder (see discussion above) disease. 9. Relative enlargement of the prostate gland.     CT C/A/P 2/21/2020  IMPRESSION:  Stable small mass pancreatic neck. Unchanged peritoneal carcinomatosis and fluid  collection/peritoneal cystic lesion right subphrenic space. Stable hepatic  cysts. Cholelithiasis.  No new evidence to suggest metastatic disease.     Assessment:   1) Metastatic Pancreatic Carcinoma with Peritoneal Metastasis  2) Neoplasm Pain  3) PE  4) Back Pain  5) New CHF     Plan:   1) Continue with dose reduced FOLFIRINOX-Repeat Imaging shows its decreasing in size so has correlated with his CA 19-9 which continues to drop.  We have stopped the Oxaliplatin due to his worsening neuropathy--Will plan on repeating scans in 1-2 months and see me again in 6 weeks    2) Pain controlled much better since stopping the oxaliplatin--stopped the Fentanyl and oxycodone on his own, continue the nortriptyline at bedtime and that did seem to help neuropathy and sleep--will continue. Repeat 19-9 next cycle  3) Continue Xarelto 20mg daily  4) Likely due to OA/DJD as it hasn't improved with the improving pancreatic ca. 5) Following with Cardiology--med management--is not currently on cardiotoxic chemotherapy.       Signed By: Jose Paul MD

## 2020-05-14 DIAGNOSIS — C25.9 PANCREATIC CANCER METASTASIZED TO INTRA-ABDOMINAL LYMPH NODE (HCC): Primary | ICD-10-CM

## 2020-05-14 DIAGNOSIS — C77.2 PANCREATIC CANCER METASTASIZED TO INTRA-ABDOMINAL LYMPH NODE (HCC): Primary | ICD-10-CM

## 2020-05-15 ENCOUNTER — TELEPHONE (OUTPATIENT)
Dept: ONCOLOGY | Age: 80
End: 2020-05-15

## 2020-05-20 ENCOUNTER — TELEPHONE (OUTPATIENT)
Dept: ONCOLOGY | Age: 80
End: 2020-05-20

## 2020-05-20 DIAGNOSIS — C77.2 SECONDARY AND UNSPECIFIED MALIGNANT NEOPLASM OF INTRA-ABDOMINAL LYMPH NODES (HCC): ICD-10-CM

## 2020-05-20 DIAGNOSIS — Z85.89 HISTORY OF MALIGNANT NEOPLASM METASTATIC TO LUNG: ICD-10-CM

## 2020-05-20 DIAGNOSIS — C25.0 MALIGNANT NEOPLASM OF HEAD OF PANCREAS (HCC): ICD-10-CM

## 2020-05-20 DIAGNOSIS — C25.0 MALIGNANT NEOPLASM OF HEAD OF PANCREAS (HCC): Primary | ICD-10-CM

## 2020-06-16 NOTE — PROGRESS NOTES
Demetra Gentile is a 79 y.o. male who is seen for follow up pancreatic cancer. Patient continues to have back pain, is having an injection tomorrow. Concetta continues with SOB, he is seeing Dr Weston Oconnor (Cardiologist) on Friday.         Chemotherapy Flowsheet 6/15/2020   Cycle C 33 D 1   Date 6/15/2020   Drug / Regimen Irinotecan/Leucovorin   Dosage -   Time Up -   Time Down -   Pre Hydration -   Pre Meds -   Notes CIV 5 FU pump     Key Oncology Meds             ondansetron (ZOFRAN ODT) 4 mg disintegrating tablet Take 1 Tab by mouth every eight (8) hours as needed for Nausea. Indications: Nausea and Vomiting caused by Cancer Drugs        Key Pain Meds             ibuprofen (MOTRIN) 200 mg tablet Take 400 mg by mouth daily as needed for Pain. acetaminophen (TYLENOL EXTRA STRENGTH) 500 mg tablet Take 500 mg by mouth every six (6) hours as needed for Pain.

## 2020-06-17 ENCOUNTER — OFFICE VISIT (OUTPATIENT)
Dept: ONCOLOGY | Age: 80
End: 2020-06-17

## 2020-06-17 VITALS
SYSTOLIC BLOOD PRESSURE: 113 MMHG | HEIGHT: 73 IN | TEMPERATURE: 97.5 F | OXYGEN SATURATION: 97 % | HEART RATE: 71 BPM | WEIGHT: 184.4 LBS | RESPIRATION RATE: 18 BRPM | BODY MASS INDEX: 24.44 KG/M2 | DIASTOLIC BLOOD PRESSURE: 62 MMHG

## 2020-06-17 DIAGNOSIS — C25.0 MALIGNANT NEOPLASM OF HEAD OF PANCREAS (HCC): Primary | ICD-10-CM

## 2020-06-17 NOTE — PROGRESS NOTES
Reason for Visit:   Leigha Keyes is seen for follow up pancreatic cancer     Treatment History:   Dx: Metastatic Pancreatic Adenocarcinoma--Nov 2018--peritoneal mets  Tx: FOLFIRINOX--first cycle 2/4/19, second cycle 2/18/19, third cycle 3/4/19, fourth cycle on 3/18/19, 5th on 4/1/19, 6th 4/15/2019, 7th 4/29/19, 8th 5/13/19, 9th 6/5/19 (dose reduced due to toxicity), 10th 6/24/19, 11th 7/5/19, 12th 7/22/2019, 13th 8/5/2019, 14th 8/19/2019, 15th 8/9/2019, 16th 9/3/2019, 17th 9/23/2019, 18th 10/7/2019, 19 10/21/2019, 20 11/4/2019, 21 11/18/2019, 22 12/2/2019 (stopped the Pamela Dasilva), 23 12/16/2019 (no Oxali), 24 1/6/2020 (no Oxali), 25 1/20/2020 (no Oxali), 26 2/3/2020 (no Oxali), 27 2/17/2020 (no Oxali), 28 3/2/2020 (no Oxali), 29 3/16/2020 (no Oxali), 30 3/30/2020 (no Oxali), 31 4/13/2020 (no Oxali), 32 4/13/2020 (no Oxali), 33 5/11/2020 (no Oxali)  Goal: prolong survival, Cycle length: until progression, Will return to see me prior to each cycle    History of Present Illness:   Feels good, no changes, little dizzy with standing at times. No f/c/s, no bleeding or bruising, no new aches or pains, no wt loss/n/v/d.     Past Medical History:   Diagnosis Date    CAD (coronary artery disease)     3 cardiac stents   heart Dr. Jacklyn Wagner Cervical spondylosis without myelopathy 2009    Chronic pain     shoulder left, upper lumbar    Depressive disorder, not elsewhere classified     Diaphragmatic hernia without mention of obstruction or gangrene     GERD (gastroesophageal reflux disease)     Hypercholesterolemia     Hypertension     Hypertrophy of prostate without urinary obstruction and other lower urinary tract symptoms (LUTS) 2008    Insomnia, unspecified 2009    Lumbosacral spondylosis without myelopathy 2009    Osteoarthrosis involving, or with mention of more than one site, but not specified as generalized, multiple sites 2010    Varicose veins 12/11/2014      Past Surgical History:   Procedure Laterality Date    CABG, ARTERY-VEIN, TWO  12    CABG    HX APPENDECTOMY      HX CATARACT REMOVAL  2015    bilateral    HX COLONOSCOPY  2010    x2 small beign polyps    HX COLONOSCOPY  2017    hyperplastic polyp    HX ENDOSCOPY  2010    acid reflux    HX HEART CATHETERIZATION  ,     3 stents    HX HEENT      fx nose repair    HX HERNIA REPAIR  early     left inguinal, with mesh    HX ORTHOPAEDIC  2017    lumbar fusion    HX OTHER SURGICAL  7 yrs. old    Hiatal Hernia    HX TONSILLECTOMY      HX VASCULAR ACCESS      IR INSERT TUNL CVAD W PORT LESS THAN 5 YR  2019      Social History     Tobacco Use    Smoking status: Former Smoker     Packs/day: 1.00     Years: 10.00     Pack years: 10.00     Types: Cigarettes     Start date: 1961     Last attempt to quit: 1971     Years since quittin.4    Smokeless tobacco: Never Used   Substance Use Topics    Alcohol use: No     Frequency: Never     Binge frequency: Never      Family History   Problem Relation Age of Onset    Hypertension Father     Heart Disease Father     Arthritis-osteo Mother     Cancer Brother 78        tumor in back    Heart Disease Brother     Diabetes Maternal Grandfather     Kidney Disease Maternal Grandfather      Current Outpatient Medications   Medication Sig    ranolazine ER (RANEXA) 500 mg SR tablet Take 1 Tab by mouth two (2) times a day.  DULoxetine (CYMBALTA) 60 mg capsule TAKE 1 CAPSULE BY MOUTH DAILY. INDICATIONS: CHRONIC MUSCLE OR BONE PAIN    ibuprofen (MOTRIN) 200 mg tablet Take 400 mg by mouth daily as needed for Pain.  nortriptyline (PAMELOR) 25 mg capsule Take 2 Caps by mouth nightly. (Patient taking differently: Take 25 mg by mouth nightly.)    tamsulosin (FLOMAX) 0.4 mg capsule TAKE 2 CAPSULES BY MOUTH NIGHLTY    ondansetron (ZOFRAN ODT) 4 mg disintegrating tablet Take 1 Tab by mouth every eight (8) hours as needed for Nausea. Indications: Nausea and Vomiting caused by Cancer Drugs    dexAMETHasone (DECADRON) 4 mg tablet Take 8 mg by mouth daily. Indications: Prevent Nausea and Vomiting from Cancer Chemotherapy    rosuvastatin (CRESTOR) 10 mg tablet Take 1 Tab by mouth nightly. Indications: excessive fat in the blood    rivaroxaban (XARELTO) 20 mg tab tablet Take 1 Tab by mouth daily (with breakfast). Indications: a clot in the lung    acetaminophen (TYLENOL EXTRA STRENGTH) 500 mg tablet Take 500 mg by mouth every six (6) hours as needed for Pain.  albuterol (VENTOLIN HFA) 90 mcg/actuation inhaler Take 2 Puffs by inhalation every four (4) hours as needed for Wheezing.  LORazepam (ATIVAN) 0.5 mg tablet Take 1 Tab by mouth every four (4) hours as needed for Anxiety. Max Daily Amount: 3 mg. Indications: Nausea and Vomiting caused by Cancer Drugs    omeprazole (PRILOSEC) 20 mg capsule Take 20 mg by mouth daily.  polyethylene glycol (MIRALAX) 17 gram/dose powder Take 17 g by mouth daily as needed.  amLODIPine (NORVASC) 5 mg tablet Take 5 mg by mouth daily.  METOPROLOL TARTRATE PO Take 25 mg by mouth two (2) times a day.  multivitamin (ONE A DAY) tablet Take 1 Tab by mouth daily.  aspirin 81 mg tablet Take 81 mg by mouth.  dutasteride (AVODART) 0.5 mg capsule Take 0.5 mg by mouth daily. No current facility-administered medications for this visit. Facility-Administered Medications Ordered in Other Visits   Medication Dose Route Frequency    fluorouraciL (ADRUCIL) 3,546 mg, 0.9% sodium chloride 29.08 mL 100 mL CADD Cassette  1,800 mg/m2 (Treatment Plan Recorded) IntraVENous ONCE      No Known Allergies     Review of Systems: A complete review of systems was obtained, negative except as described above. Physical Exam:   There were no vitals taken for this visit.   ECOG PS: 0  General: No distress  Eyes: PERRLA, anicteric sclerae  HENT: Atraumatic, OP clear  Neck: Supple  Lymphatic: No cervical, supraclavicular, or inguinal adenopathy  Respiratory: CTAB, normal respiratory effort  CV: Normal rate, regular rhythm, no murmurs, no peripheral edema  GI: Soft, nontender, nondistended, no masses, no hepatomegaly, no splenomegaly  MS: Normal gait and station. Digits without clubbing or cyanosis. Skin: No rashes, ecchymoses, or petechiae. Normal temperature, turgor, and texture. Psych: Alert, oriented, appropriate affect, normal judgment/insight    Results:     Lab Results   Component Value Date/Time    WBC 3.1 (L) 06/15/2020 09:55 AM    HGB 10.6 (L) 06/15/2020 09:55 AM    HCT 31.7 (L) 06/15/2020 09:55 AM    PLATELET 200 67/42/1499 09:55 AM    .6 (H) 06/15/2020 09:55 AM    ABS. NEUTROPHILS 2.0 06/15/2020 09:55 AM     Lab Results   Component Value Date/Time    Sodium 134 (L) 06/15/2020 09:55 AM    Potassium 4.4 06/15/2020 09:55 AM    Chloride 102 06/15/2020 09:55 AM    CO2 28 06/15/2020 09:55 AM    Glucose 135 (H) 06/15/2020 09:55 AM    BUN 17 06/15/2020 09:55 AM    Creatinine 1.13 06/15/2020 09:55 AM    GFR est AA >60 06/15/2020 09:55 AM    GFR est non-AA >60 06/15/2020 09:55 AM    Calcium 8.6 06/15/2020 09:55 AM    Glucose (POC) 112 (H) 01/12/2012 06:32 AM     Lab Results   Component Value Date/Time    Bilirubin, total 0.3 06/15/2020 09:55 AM    ALT (SGPT) 28 06/15/2020 09:55 AM    Alk. phosphatase 55 06/15/2020 09:55 AM    Protein, total 6.1 (L) 06/15/2020 09:55 AM    Albumin 3.4 (L) 06/15/2020 09:55 AM    Globulin 2.7 06/15/2020 09:55 AM       CT C/A/P 4/5/2019  Resolved pulmonary emboli possible persistent right femoral vein DVT. Positive carcinomatosis with interval decrease in amount of ascites. Incidental cholelithiasis, lower lumbar fusion and hepatic cysts     CT C/A/P 6/21/2019  1. Mild interval decrease in size of pancreatic head/neck mass. 2. Stable peritoneal carcinomatosis.  New pocket of loculated ascites underneath  the right hemidiaphragm measuring 3.3 x 2.7 x 2.3 cm.  3. No evidence of metastatic disease in the chest.  4. Cholelithiasis      CT C/A/P 10/11/2019  IMPRESSION:  1. Further decrease in the size of the previously described small pancreatic  head mass lesion. 2. Continued evidence of focal nodular lesions in the anterior aspect of the  peritoneum compatible with carcinomatosis. No evidence of para-aortic  lymphadenopathy. 3. No focal intrathoracic nodules/mass lesions identified. No evidence of  mediastinal or hilar lymphadenopathy. 4. Persistence of the previously described focal, loculated collection of  ascites in the region of the right hemidiaphragm. 5. Normal sized liver. Persistence of the previously described multiple focal  low-density areas within liver suggestive of cysts. 6. Normal sized spleen. No focal splenic lesions identified. 7. Normal appearance of the kidneys. 8. Suboptimal distention of the urinary bladder (see discussion above) disease. 9. Relative enlargement of the prostate gland.     CT C/A/P 2/21/2020  IMPRESSION:  Stable small mass pancreatic neck. Unchanged peritoneal carcinomatosis and fluid  collection/peritoneal cystic lesion right subphrenic space. Stable hepatic  cysts. Cholelithiasis. No new evidence to suggest metastatic disease. CT C/A/P 5/21/2020  IMPRESSION:  1. No change in the appearance of the previously described small mass lesion in  the region of the neck of the pancreas. Continued evidence of peritoneal  carcinomatosis. No evidence of intra-abdominal, intrapelvic or inguinal  lymphadenopathy. 2. Normal sized liver with evidence of fatty infiltration. Presence of multiple  focal, well-demarcated fluid collections within the liver compatible with cysts. Stable appearance of the previously described subphrenic fluid collection. Continued evidence of cholelithiasis. 3. Presence of a moderate amount of gas and fecal material within the colon. 4. Normal appearance of the kidneys  5. Relative enlargement of the prostate gland.   6. Evidence of colonic diverticulosis. No evidence of diverticulitis.     Assessment:   1) Metastatic Pancreatic Carcinoma with Peritoneal Metastasis  2) Neoplasm Pain  3) PE  4) Back Pain  5) New CHF     Plan:   1) Continue with dose reduced FOLFIRINOX-Repeat Imaging shows stable disease, CA 19-9 increased after 4 week break but has been stable since restarting, will repeat it prior to next cycle.  We have stopped the Oxaliplatin due to his worsening neuropathy--back to see me in one month  2) Pain controlled much better since stopping the oxaliplatin--stopped the Fentanyl and oxycodone on his own, continue the nortriptyline at bedtime and that did seem to help neuropathy and sleep--will continue.  Repeat 19-9 next cycle  3) Continue Xarelto 20mg daily  4) Likely due to OA/DJD as it hasn't improved with the improving pancreatic ca. 5) Following with Cardiology--med management--is not currently on cardiotoxic chemotherapy.       Signed By: Dior Smith MD

## 2020-07-15 ENCOUNTER — OFFICE VISIT (OUTPATIENT)
Dept: ONCOLOGY | Age: 80
End: 2020-07-15

## 2020-07-15 VITALS
SYSTOLIC BLOOD PRESSURE: 125 MMHG | TEMPERATURE: 98.2 F | WEIGHT: 183 LBS | HEIGHT: 73 IN | OXYGEN SATURATION: 96 % | RESPIRATION RATE: 18 BRPM | DIASTOLIC BLOOD PRESSURE: 75 MMHG | BODY MASS INDEX: 24.25 KG/M2 | HEART RATE: 58 BPM

## 2020-07-15 DIAGNOSIS — C25.0 MALIGNANT NEOPLASM OF HEAD OF PANCREAS (HCC): Primary | ICD-10-CM

## 2020-07-15 RX ORDER — FINASTERIDE 5 MG/1
5 TABLET, FILM COATED ORAL DAILY
COMMUNITY
End: 2020-09-16

## 2020-07-15 NOTE — PROGRESS NOTES
Reason for Visit:   Mariya Sommer is seen for follow up pancreatic cancer     Treatment History:   Dx: Metastatic Pancreatic Adenocarcinoma--Nov 2018--peritoneal mets  Tx: FOLFIRINOX--first cycle 2/4/19, second cycle 2/18/19, third cycle 3/4/19, fourth cycle on 3/18/19, 5th on 4/1/19, 6th 4/15/2019, 7th 4/29/19, 8th 5/13/19, 9th 6/5/19 (dose reduced due to toxicity), 10th 6/24/19, 11th 7/5/19, 12th 7/22/2019, 13th 8/5/2019, 14th 8/19/2019, 15th 8/9/2019, 16th 9/3/2019, 17th 9/23/2019, 18th 10/7/2019, 19 10/21/2019, 20 11/4/2019, 21 11/18/2019, 22 12/2/2019 (stopped the [place] [facility]), 23 12/16/2019 (no Oxali), 24 1/6/2020 (no Oxali), 25 1/20/2020 (no Oxali), 26 2/3/2020 (no Oxali), 27 2/17/2020 (no Oxali), 28 3/2/2020 (no Oxali), 29 3/16/2020 (no Oxali), 30 3/30/2020 (no Oxali), 31 4/13/2020 (no Oxali), 32 4/13/2020 (no Oxali), 33 5/11/2020 (no Oxali)---3 week break-- 34 6/1/2020 (no Oxali), 35 6/15/2020 (no Oxali), 36 6/29/2020 (no Oxaili), 37 7/13/2020 (no Oxali)  Goal: prolong survival, Cycle length: until progression, Will return to see me prior to each cycle    History of Present Illness:   Tumor marker rising--will repeat today. CT's from May show stable disease. Continues tolerating chemotherapy very well. Holding on Cardiac Stenting--wants med management first--discussed need to coordinate chemo and stenting. Back pain much better.      Past Medical History:   Diagnosis Date    CAD (coronary artery disease)     3 cardiac stents   heart Dr. Vel Mckinley Cervical spondylosis without myelopathy 2009    Chronic pain     shoulder left, upper lumbar    Depressive disorder, not elsewhere classified     Diaphragmatic hernia without mention of obstruction or gangrene     GERD (gastroesophageal reflux disease)     Hypercholesterolemia     Hypertension     Hypertrophy of prostate without urinary obstruction and other lower urinary tract symptoms (LUTS) 2008    Insomnia, unspecified 2009    Lumbosacral spondylosis without myelopathy 2009    Osteoarthrosis involving, or with mention of more than one site, but not specified as generalized, multiple sites 2010    Varicose veins 2014      Past Surgical History:   Procedure Laterality Date    CABG, ARTERY-VEIN, TWO  12    CABG    HX APPENDECTOMY      HX CATARACT REMOVAL  2015    bilateral    HX COLONOSCOPY  2010    x2 small beign polyps    HX COLONOSCOPY  2017    hyperplastic polyp    HX ENDOSCOPY  2010    acid reflux    [address]    3 stents    HX HEENT      fx nose repair    HX HERNIA REPAIR  early     left inguinal, with mesh    HX ORTHOPAEDIC  2017    lumbar fusion    HX OTHER SURGICAL  7 yrs. old    Hiatal Hernia    HX TONSILLECTOMY      HX VASCULAR ACCESS      IR INSERT TUNL CVAD W PORT LESS THAN 5 YR  2019      Social History     Tobacco Use    Smoking status: Former Smoker     Packs/day: 1.00     Years: 10.00     Pack years: 10.00     Types: Cigarettes     Start date: 1961     Last attempt to quit: 1971     Years since quittin.5    Smokeless tobacco: Never Used   Substance Use Topics    Alcohol use: No     Frequency: Never     Binge frequency: Never      Family History   Problem Relation Age of Onset    Hypertension Father     Heart Disease Father     Arthritis-osteo Mother     Cancer Brother 78        tumor in back    Heart Disease Brother     Diabetes Maternal Grandfather     Kidney Disease Maternal Grandfather      Current Outpatient Medications   Medication Sig    cyanocobalamin (VITAMIN B12) 500 mcg tablet Take 500 mcg by mouth daily.  docusate sodium (COLACE) 100 mg capsule Take 100 mg by mouth two (2) times a day.  ranolazine ER (RANEXA) 500 mg SR tablet Take 1,000 mg by mouth two (2) times a day.  DULoxetine (CYMBALTA) 60 mg capsule TAKE 1 CAPSULE BY MOUTH DAILY.  INDICATIONS: CHRONIC MUSCLE OR BONE PAIN    ibuprofen (MOTRIN) 200 mg tablet Take 400 mg by mouth daily as needed for Pain.  nortriptyline (PAMELOR) 25 mg capsule Take 2 Caps by mouth nightly. (Patient taking differently: Take 25 mg by mouth nightly.)    tamsulosin (FLOMAX) 0.4 mg capsule TAKE 2 CAPSULES BY MOUTH NIGHLTY (Patient taking differently: TAKE 2 CAPSULES BY MOUTH NIGHLTY and 1 AM)    ondansetron (ZOFRAN ODT) 4 mg disintegrating tablet Take 1 Tab by mouth every eight (8) hours as needed for Nausea. Indications: Nausea and Vomiting caused by Cancer Drugs    dexAMETHasone (DECADRON) 4 mg tablet Take 8 mg by mouth daily. Indications: Prevent Nausea and Vomiting from Cancer Chemotherapy    rosuvastatin (CRESTOR) 10 mg tablet Take 1 Tab by mouth nightly. Indications: excessive fat in the blood    rivaroxaban (XARELTO) 20 mg tab tablet Take 1 Tab by mouth daily (with breakfast). Indications: a clot in the lung    acetaminophen (TYLENOL EXTRA STRENGTH) 500 mg tablet Take 500 mg by mouth every six (6) hours as needed for Pain.  albuterol (VENTOLIN HFA) 90 mcg/actuation inhaler Take 2 Puffs by inhalation every four (4) hours as needed for Wheezing.  LORazepam (ATIVAN) 0.5 mg tablet Take 1 Tab by mouth every four (4) hours as needed for Anxiety. Max Daily Amount: 3 mg. Indications: Nausea and Vomiting caused by Cancer Drugs    omeprazole (PRILOSEC) 20 mg capsule Take 20 mg by mouth daily.  polyethylene glycol (MIRALAX) 17 gram/dose powder Take 17 g by mouth daily as needed.  amLODIPine (NORVASC) 5 mg tablet Take 5 mg by mouth daily.  METOPROLOL TARTRATE PO Take 25 mg by mouth two (2) times a day.  multivitamin (ONE A DAY) tablet Take 1 Tab by mouth daily.  aspirin 81 mg tablet Take 81 mg by mouth.  dutasteride (AVODART) 0.5 mg capsule Take 0.5 mg by mouth daily. No current facility-administered medications for this visit.       Facility-Administered Medications Ordered in Other Visits   Medication Dose Route Frequency    saline peripheral flush soln 10 mL  10 mL InterCATHeter PRN    heparin (porcine) pf 300-500 Units  300-500 Units InterCATHeter PRN      No Known Allergies     Review of Systems: A complete review of systems was obtained, negative except as described above. Physical Exam:   There were no vitals taken for this visit. ECOG PS: 0  General: No distress  Eyes: PERRLA, anicteric sclerae  HENT: Atraumatic, OP clear  Neck: Supple  Lymphatic: No cervical, supraclavicular, or inguinal adenopathy  Respiratory: CTAB, normal respiratory effort  CV: Normal rate, regular rhythm, no murmurs, no peripheral edema  GI: Soft, nontender, nondistended, no masses, no hepatomegaly, no splenomegaly  MS: Normal gait and station. Digits without clubbing or cyanosis. Skin: No rashes, ecchymoses, or petechiae. Normal temperature, turgor, and texture. Psych: Alert, oriented, appropriate affect, normal judgment/insight    Results:     Lab Results   Component Value Date/Time    WBC 2.9 (L) 07/13/2020 09:40 AM    HGB 11.1 (L) 07/13/2020 09:40 AM    HCT 33.1 (L) 07/13/2020 09:40 AM    PLATELET 515 (L) 34/29/8276 09:40 AM    .8 (H) 07/13/2020 09:40 AM    ABS. NEUTROPHILS 1.9 07/13/2020 09:40 AM     Lab Results   Component Value Date/Time    Sodium 137 07/13/2020 09:40 AM    Potassium 4.6 07/13/2020 09:40 AM    Chloride 102 07/13/2020 09:40 AM    CO2 26 07/13/2020 09:40 AM    Glucose 121 (H) 07/13/2020 09:40 AM    BUN 18 07/13/2020 09:40 AM    Creatinine 1.16 07/13/2020 09:40 AM    GFR est AA >60 07/13/2020 09:40 AM    GFR est non-AA >60 07/13/2020 09:40 AM    Calcium 8.6 07/13/2020 09:40 AM    Glucose (POC) 112 (H) 01/12/2012 06:32 AM     Lab Results   Component Value Date/Time    Bilirubin, total 0.3 07/13/2020 09:40 AM    ALT (SGPT) 24 07/13/2020 09:40 AM    Alk.  phosphatase 55 07/13/2020 09:40 AM    Protein, total 6.2 (L) 07/13/2020 09:40 AM    Albumin 3.5 07/13/2020 09:40 AM    Globulin 2.7 07/13/2020 09:40 AM       CT C/A/P 4/5/2019  Resolved pulmonary emboli possible persistent right femoral vein DVT. Positive carcinomatosis with interval decrease in amount of ascites. Incidental cholelithiasis, lower lumbar fusion and hepatic cysts     CT C/A/P 6/21/2019  1. Mild interval decrease in size of pancreatic head/neck mass. 2. Stable peritoneal carcinomatosis. New pocket of loculated ascites underneath  the right hemidiaphragm measuring 3.3 x 2.7 x 2.3 cm.  3. No evidence of metastatic disease in the chest.  4. Cholelithiasis      CT C/A/P 10/11/2019  IMPRESSION:  1. Further decrease in the size of the previously described small pancreatic  head mass lesion. 2. Continued evidence of focal nodular lesions in the anterior aspect of the  peritoneum compatible with carcinomatosis. No evidence of para-aortic  lymphadenopathy. 3. No focal intrathoracic nodules/mass lesions identified. No evidence of  mediastinal or hilar lymphadenopathy. 4. Persistence of the previously described focal, loculated collection of  ascites in the region of the right hemidiaphragm. 5. Normal sized liver. Persistence of the previously described multiple focal  low-density areas within liver suggestive of cysts. 6. Normal sized spleen. No focal splenic lesions identified. 7. Normal appearance of the kidneys. 8. Suboptimal distention of the urinary bladder (see discussion above) disease. 9. Relative enlargement of the prostate gland.     CT C/A/P 2/21/2020  IMPRESSION:  Stable small mass pancreatic neck. Unchanged peritoneal carcinomatosis and fluid  collection/peritoneal cystic lesion right subphrenic space. Stable hepatic  cysts. Cholelithiasis. No new evidence to suggest metastatic disease.     CT C/A/P 5/21/2020  IMPRESSION:  1. No change in the appearance of the previously described small mass lesion in  the region of the neck of the pancreas. Continued evidence of peritoneal  carcinomatosis.  No evidence of intra-abdominal, intrapelvic or inguinal  lymphadenopathy. 2. Normal sized liver with evidence of fatty infiltration. Presence of multiple  focal, well-demarcated fluid collections within the liver compatible with cysts. Stable appearance of the previously described subphrenic fluid collection. Continued evidence of cholelithiasis. 3. Presence of a moderate amount of gas and fecal material within the colon. 4. Normal appearance of the kidneys  5. Relative enlargement of the prostate gland. 6. Evidence of colonic diverticulosis. No evidence of diverticulitis.     Assessment:   1) Metastatic Pancreatic Carcinoma with Peritoneal Metastasis  2) Neoplasm Pain  3) PE  4) Back Pain  5) New CHF     Plan:   1) Continue with dose reduced FOLFIRINOX-Repeat Imaging in May showed stable disease, however CA 19-9 increasing, we will repeat today.  We have stopped the Oxaliplatin due to his worsening neuropathy--back to see me in one month  2) Pain controlled much better since stopping the oxaliplatin--stopped the Fentanyl and oxycodone on his own, continue the nortriptyline at bedtime and that did seem to help neuropathy and sleep--will continue.  Repeat 19-9 with each cycle  3) Continue Xarelto 20mg daily  4) Likely due to OA/DJD as it hasn't improved with the improving pancreatic ca. 5) Following with Cardiology--med management--is not currently on cardiotoxic chemotherapy.   Will discuss Cardiac Stenting with me prior to undergoing--have to coordinate with blood counts being acceptable.         Signed By: Sydney Diaz MD

## 2020-07-15 NOTE — PROGRESS NOTES
Pt is here for routine follow up, he reports feeling well, no complaints. Visit Vitals  /75   Pulse (!) 58   Temp 98.2 °F (36.8 °C)   Resp 18   Ht 6' 1\" (1.854 m)   Wt 183 lb (83 kg)   SpO2 96%   BMI 24.14 kg/m²       Pain Scale: 0 - No pain/10  Pain Location:       1. Have you been to the ER, urgent care clinic since your last visit? Hospitalized since your last visit? no    2. Have you seen or consulted any other health care providers outside of the [address] [place] [address] since your last visit? Include any pap smears or colon screening.   No    Health Maintenance reviewed

## 2020-07-16 ENCOUNTER — TELEPHONE (OUTPATIENT)
Dept: ONCOLOGY | Age: 80
End: 2020-07-16

## 2020-07-16 NOTE — TELEPHONE ENCOUNTER
Called and spoke with patient's spouse Rao Mitchell)   PHI confirmed x2 identifiers   Provided spouse with recent  results   Wife verbalized understanding   No new questions or concerns at this time

## 2020-07-23 ENCOUNTER — TELEPHONE (OUTPATIENT)
Dept: ONCOLOGY | Age: 80
End: 2020-07-23

## 2020-07-23 NOTE — TELEPHONE ENCOUNTER
Patient called and left voicemail stating that he has had several episodes of \"passing out. \"  Blood pressure has been low, so he called Dr Kim Mitchell [phone], and she prescribed medication.   Please call

## 2020-07-27 NOTE — TELEPHONE ENCOUNTER
Patient in [facility] today, he has been started on Midodrine for hypotension,

## 2020-07-30 ENCOUNTER — HOSPITAL ENCOUNTER (INPATIENT)
Age: 80
LOS: 2 days | Discharge: HOME | DRG: 312 | End: 2020-08-02
Attending: EMERGENCY MEDICINE | Admitting: INTERNAL MEDICINE
Payer: MEDICARE

## 2020-07-30 ENCOUNTER — APPOINTMENT (OUTPATIENT)
Dept: CT IMAGING | Age: 80
DRG: 312 | End: 2020-07-30
Attending: INTERNAL MEDICINE
Payer: MEDICARE

## 2020-07-30 DIAGNOSIS — R55 SYNCOPE AND COLLAPSE: ICD-10-CM

## 2020-07-30 DIAGNOSIS — I95.1 ORTHOSTASIS: Primary | ICD-10-CM

## 2020-07-30 PROCEDURE — 99218 HC RM OBSERVATION: CPT

## 2020-07-30 PROCEDURE — 74011250636 HC RX REV CODE- 250/636: Performed by: INTERNAL MEDICINE

## 2020-07-30 PROCEDURE — 99285 EMERGENCY DEPT VISIT HI MDM: CPT

## 2020-07-30 PROCEDURE — 94760 N-INVAS EAR/PLS OXIMETRY 1: CPT

## 2020-07-30 RX ORDER — SODIUM CHLORIDE 0.9 % (FLUSH) 0.9 %
5-40 SYRINGE (ML) INJECTION EVERY 8 HOURS
Status: DISCONTINUED | OUTPATIENT
Start: 2020-07-30 | End: 2020-08-02 | Stop reason: HOSPADM

## 2020-07-30 RX ORDER — SODIUM CHLORIDE 9 MG/ML
100 INJECTION, SOLUTION INTRAVENOUS CONTINUOUS
Status: DISCONTINUED | OUTPATIENT
Start: 2020-07-30 | End: 2020-07-31

## 2020-07-30 RX ORDER — SODIUM CHLORIDE 0.9 % (FLUSH) 0.9 %
5-40 SYRINGE (ML) INJECTION AS NEEDED
Status: DISCONTINUED | OUTPATIENT
Start: 2020-07-30 | End: 2020-08-02 | Stop reason: HOSPADM

## 2020-07-30 RX ADMIN — Medication 10 ML: at 22:00

## 2020-07-30 RX ADMIN — SODIUM CHLORIDE 100 ML/HR: 900 INJECTION, SOLUTION INTRAVENOUS at 23:00

## 2020-07-30 NOTE — ED TRIAGE NOTES
Patient to the ED via EMS from [address] [place] [address], reports dizziness in the morning. Recent medication changes to increase blood pressure, reports recent falls 'I fainted' due to dizziness. Patient AAO x4, GCS 15, denies dizziness at this time. Cardiologist recommended evaluation by ED.

## 2020-07-31 ENCOUNTER — APPOINTMENT (OUTPATIENT)
Dept: CT IMAGING | Age: 80
DRG: 312 | End: 2020-07-31
Attending: INTERNAL MEDICINE
Payer: MEDICARE

## 2020-07-31 ENCOUNTER — APPOINTMENT (OUTPATIENT)
Dept: NON INVASIVE DIAGNOSTICS | Age: 80
DRG: 312 | End: 2020-07-31
Attending: INTERNAL MEDICINE
Payer: MEDICARE

## 2020-07-31 ENCOUNTER — APPOINTMENT (OUTPATIENT)
Dept: VASCULAR SURGERY | Age: 80
DRG: 312 | End: 2020-07-31
Attending: INTERNAL MEDICINE
Payer: MEDICARE

## 2020-07-31 LAB
ECHO AO ROOT DIAM: 4.1 CM
ECHO AV PEAK GRADIENT: 5.34 MMHG
ECHO AV PEAK VELOCITY: 115.5 CM/S
ECHO LA TO AORTIC ROOT RATIO: 1.56
ECHO LV EDV A2C: 85.11 ML
ECHO LV EDV A4C: 94.65 ML
ECHO LV EDV BP: 89.84 ML (ref 67–155)
ECHO LV EDV INDEX A4C: 46.5 ML/M2
ECHO LV EDV INDEX BP: 44.1 ML/M2
ECHO LV EDV NDEX A2C: 41.8 ML/M2
ECHO LV EJECTION FRACTION A2C: 33 PERCENT
ECHO LV EJECTION FRACTION A4C: 8 PERCENT
ECHO LV EJECTION FRACTION BIPLANE: 9.4 PERCENT (ref 55–100)
ECHO LV ESV A2C: 113.1 ML
ECHO LV ESV A4C: 86.93 ML
ECHO LV ESV BP: 98.26 ML (ref 22–58)
ECHO LV ESV INDEX A2C: 55.5 ML/M2
ECHO LV ESV INDEX A4C: 42.7 ML/M2
ECHO LV ESV INDEX BP: 48.2 ML/M2
ECHO LV INTERNAL DIMENSION DIASTOLIC: 3.67 CM (ref 4.2–5.9)
ECHO LV INTERNAL DIMENSION SYSTOLIC: 3.61 CM
ECHO LV IVSD: 1.22 CM (ref 0.6–1)
ECHO LV MASS 2D: 133.8 G (ref 88–224)
ECHO LV MASS INDEX 2D: 65.7 G/M2 (ref 49–115)
ECHO LV POSTERIOR WALL DIASTOLIC: 1.05 CM (ref 0.6–1)
ECHO LVOT PEAK GRADIENT: 2.08 MMHG
ECHO LVOT PEAK VELOCITY: 72.16 CM/S
ECHO RV TAPSE: 1.11 CM (ref 1.5–2)
ECHO TV REGURGITANT MAX VELOCITY: 243.53 CM/S
ECHO TV REGURGITANT PEAK GRADIENT: 23.72 MMHG
OSMOLALITY SERPL: 286 MOSM/KG H2O
OSMOLALITY UR: 551 MOSM/KG H2O
SODIUM UR-SCNC: 120 MMOL/L

## 2020-07-31 PROCEDURE — 80048 BASIC METABOLIC PNL TOTAL CA: CPT

## 2020-07-31 PROCEDURE — 93880 EXTRACRANIAL BILAT STUDY: CPT

## 2020-07-31 PROCEDURE — 74011250637 HC RX REV CODE- 250/637: Performed by: INTERNAL MEDICINE

## 2020-07-31 PROCEDURE — 99218 HC RM OBSERVATION: CPT

## 2020-07-31 PROCEDURE — 65660000000 HC RM CCU STEPDOWN

## 2020-07-31 PROCEDURE — 83930 ASSAY OF BLOOD OSMOLALITY: CPT

## 2020-07-31 PROCEDURE — 93005 ELECTROCARDIOGRAM TRACING: CPT

## 2020-07-31 PROCEDURE — 74011000258 HC RX REV CODE- 258: Performed by: RADIOLOGY

## 2020-07-31 PROCEDURE — 84300 ASSAY OF URINE SODIUM: CPT

## 2020-07-31 PROCEDURE — 74011000250 HC RX REV CODE- 250: Performed by: NURSE PRACTITIONER

## 2020-07-31 PROCEDURE — 85027 COMPLETE CBC AUTOMATED: CPT

## 2020-07-31 PROCEDURE — 94760 N-INVAS EAR/PLS OXIMETRY 1: CPT

## 2020-07-31 PROCEDURE — 93306 TTE W/DOPPLER COMPLETE: CPT

## 2020-07-31 PROCEDURE — 71275 CT ANGIOGRAPHY CHEST: CPT

## 2020-07-31 PROCEDURE — 74011250636 HC RX REV CODE- 250/636: Performed by: INTERNAL MEDICINE

## 2020-07-31 PROCEDURE — 74011636320 HC RX REV CODE- 636/320: Performed by: RADIOLOGY

## 2020-07-31 PROCEDURE — 83735 ASSAY OF MAGNESIUM: CPT

## 2020-07-31 PROCEDURE — 83935 ASSAY OF URINE OSMOLALITY: CPT

## 2020-07-31 PROCEDURE — 36415 COLL VENOUS BLD VENIPUNCTURE: CPT

## 2020-07-31 RX ORDER — LORAZEPAM 0.5 MG/1
0.5 TABLET ORAL
Status: DISCONTINUED | OUTPATIENT
Start: 2020-07-31 | End: 2020-08-02 | Stop reason: HOSPADM

## 2020-07-31 RX ORDER — MIDODRINE HYDROCHLORIDE 5 MG/1
2.5 TABLET ORAL 2 TIMES DAILY WITH MEALS
Status: DISCONTINUED | OUTPATIENT
Start: 2020-07-31 | End: 2020-07-31

## 2020-07-31 RX ORDER — METOPROLOL SUCCINATE 25 MG/1
12.5 TABLET, EXTENDED RELEASE ORAL DAILY
Status: DISCONTINUED | OUTPATIENT
Start: 2020-08-01 | End: 2020-08-02 | Stop reason: HOSPADM

## 2020-07-31 RX ORDER — SENNOSIDES 8.6 MG/1
1 TABLET ORAL DAILY
Status: DISCONTINUED | OUTPATIENT
Start: 2020-07-31 | End: 2020-08-02 | Stop reason: HOSPADM

## 2020-07-31 RX ORDER — ASPIRIN 81 MG/1
81 TABLET ORAL DAILY
Status: DISCONTINUED | OUTPATIENT
Start: 2020-07-31 | End: 2020-08-02 | Stop reason: HOSPADM

## 2020-07-31 RX ORDER — TAMSULOSIN HYDROCHLORIDE 0.4 MG/1
0.8 CAPSULE ORAL DAILY
Status: DISCONTINUED | OUTPATIENT
Start: 2020-07-31 | End: 2020-08-02 | Stop reason: HOSPADM

## 2020-07-31 RX ORDER — RANOLAZINE 500 MG/1
1000 TABLET, EXTENDED RELEASE ORAL EVERY 12 HOURS
Status: DISCONTINUED | OUTPATIENT
Start: 2020-07-31 | End: 2020-08-02 | Stop reason: HOSPADM

## 2020-07-31 RX ORDER — FINASTERIDE 5 MG/1
5 TABLET, FILM COATED ORAL DAILY
Status: DISCONTINUED | OUTPATIENT
Start: 2020-07-31 | End: 2020-08-02 | Stop reason: HOSPADM

## 2020-07-31 RX ORDER — DULOXETIN HYDROCHLORIDE 60 MG/1
60 CAPSULE, DELAYED RELEASE ORAL DAILY
Status: DISCONTINUED | OUTPATIENT
Start: 2020-07-31 | End: 2020-08-02 | Stop reason: HOSPADM

## 2020-07-31 RX ORDER — ROSUVASTATIN CALCIUM 10 MG/1
10 TABLET, COATED ORAL
Status: DISCONTINUED | OUTPATIENT
Start: 2020-07-31 | End: 2020-08-02 | Stop reason: HOSPADM

## 2020-07-31 RX ORDER — SODIUM CHLORIDE 0.9 % (FLUSH) 0.9 %
10 SYRINGE (ML) INJECTION
Status: COMPLETED | OUTPATIENT
Start: 2020-07-31 | End: 2020-07-31

## 2020-07-31 RX ORDER — MIDODRINE HYDROCHLORIDE 5 MG/1
5 TABLET ORAL
Status: DISCONTINUED | OUTPATIENT
Start: 2020-07-31 | End: 2020-08-02 | Stop reason: HOSPADM

## 2020-07-31 RX ORDER — NORTRIPTYLINE HYDROCHLORIDE 25 MG/1
50 CAPSULE ORAL
Status: DISCONTINUED | OUTPATIENT
Start: 2020-07-31 | End: 2020-08-02 | Stop reason: HOSPADM

## 2020-07-31 RX ORDER — METOPROLOL TARTRATE 5 MG/5ML
5 INJECTION INTRAVENOUS
Status: COMPLETED | OUTPATIENT
Start: 2020-07-31 | End: 2020-08-02

## 2020-07-31 RX ADMIN — MIDODRINE HYDROCHLORIDE 2.5 MG: 5 TABLET ORAL at 08:57

## 2020-07-31 RX ADMIN — MIDODRINE HYDROCHLORIDE 5 MG: 5 TABLET ORAL at 16:31

## 2020-07-31 RX ADMIN — FINASTERIDE 5 MG: 5 TABLET, FILM COATED ORAL at 08:57

## 2020-07-31 RX ADMIN — Medication 10 ML: at 03:00

## 2020-07-31 RX ADMIN — Medication 8.6 MG: at 08:57

## 2020-07-31 RX ADMIN — SODIUM CHLORIDE 100 ML/HR: 900 INJECTION, SOLUTION INTRAVENOUS at 09:03

## 2020-07-31 RX ADMIN — RIVAROXABAN 20 MG: 20 TABLET, FILM COATED ORAL at 08:57

## 2020-07-31 RX ADMIN — Medication 10 ML: at 12:49

## 2020-07-31 RX ADMIN — SODIUM CHLORIDE 100 ML: 900 INJECTION, SOLUTION INTRAVENOUS at 03:00

## 2020-07-31 RX ADMIN — ASPIRIN 81 MG: 81 TABLET, COATED ORAL at 09:03

## 2020-07-31 RX ADMIN — ROSUVASTATIN 10 MG: 10 TABLET, FILM COATED ORAL at 21:04

## 2020-07-31 RX ADMIN — TAMSULOSIN HYDROCHLORIDE 0.8 MG: 0.4 CAPSULE ORAL at 08:57

## 2020-07-31 RX ADMIN — IOPAMIDOL 80 ML: 755 INJECTION, SOLUTION INTRAVENOUS at 03:00

## 2020-07-31 RX ADMIN — NORTRIPTYLINE HYDROCHLORIDE 50 MG: 25 CAPSULE ORAL at 21:04

## 2020-07-31 RX ADMIN — RANOLAZINE 1000 MG: 500 TABLET, FILM COATED, EXTENDED RELEASE ORAL at 21:04

## 2020-07-31 RX ADMIN — METOPROLOL TARTRATE 5 MG: 5 INJECTION INTRAVENOUS at 23:50

## 2020-07-31 RX ADMIN — Medication 10 ML: at 21:04

## 2020-07-31 RX ADMIN — DULOXETINE HYDROCHLORIDE 60 MG: 60 CAPSULE, DELAYED RELEASE ORAL at 08:57

## 2020-07-31 NOTE — PROGRESS NOTES
Bedside shift change report given to [address] (oncoming nurse) by Hamilton Pace RN (offgoing nurse). Report included the following information SBAR, Kardex, ED Summary, Procedure Summary, Intake/Output, MAR, Recent Results, Cardiac Rhythm NSR, Quality Measures and Dual Neuro Assessment.

## 2020-07-31 NOTE — PROGRESS NOTES
Problem: Syncope  Goal: *Absence of injury  Outcome: Progressing Towards Goal  Goal: Decrease or eliminate episodes of syncope  Outcome: Progressing Towards Goal     Problem: Patient Education: Go to Patient Education Activity  Goal: Patient/Family Education  Outcome: Progressing Towards Goal     Problem: Falls - Risk of  Goal: *Absence of Falls  Description: Document Toan Back Fall Risk and appropriate interventions in the flowsheet.   Outcome: Progressing Towards Goal  Note: Fall Risk Interventions:  Mobility Interventions: Communicate number of staff needed for ambulation/transfer, OT consult for ADLs, Patient to call before getting OOB, PT Consult for mobility concerns, PT Consult for assist device competence         Medication Interventions: Evaluate medications/consider consulting pharmacy    Elimination Interventions: Call light in reach, Elevated toilet seat, Patient to call for help with toileting needs, Stay With Me (per policy), Toilet paper/wipes in reach, Toileting schedule/hourly rounds    History of Falls Interventions: Consult care management for discharge planning, Door open when patient unattended, Evaluate medications/consider consulting pharmacy, Investigate reason for fall         Problem: Patient Education: Go to Patient Education Activity  Goal: Patient/Family Education  Outcome: Progressing Towards Goal

## 2020-07-31 NOTE — H&P
[address] [facility] [place] [facility] Adult  Hospitalist Group  History and Physical    Primary Care Provider: Niraj Helms MD  Date of Service:  7/30/2020    Subjective:     Gracie Preston is a [de-identified] y.o. male with past medical history significant for pancreatic cancer on chemotherapy, CAD, hypertension presented to the emergency room after having several episodes today of dizziness/diet lightheadedness while standing. Patient states that for the last several weeks he has episode of dizziness resulting in passing out. Episodes can happen during exertion and at rest. Denies chest pain, SOB, palpitations, nausea prior to syncope episode. He was seen by his cardiologist who started on Midodrine for orthostatic hypotension. He reports compliance with meds but still having symptoms. He has been experiencing increased LEVY for several weeks now. No cough or pain. Review of Systems:  Review of Systems   Constitutional: Negative for chills, fever, malaise/fatigue and weight loss. HENT: Negative for congestion, ear discharge and hearing loss. Eyes: Negative for blurred vision and double vision. Respiratory: Positive for shortness of breath. Negative for cough, sputum production and wheezing. Cardiovascular: Negative for chest pain, palpitations, orthopnea, leg swelling and PND. Gastrointestinal: Negative for abdominal pain, constipation, diarrhea, melena, nausea and vomiting. Genitourinary: Negative for dysuria, frequency and urgency. Musculoskeletal: Negative for back pain, joint pain and myalgias. Skin: Negative for itching and rash. Neurological: Positive for dizziness and loss of consciousness. Negative for sensory change, speech change, focal weakness, weakness and headaches. Syncope   Endo/Heme/Allergies: Negative for polydipsia. Does not bruise/bleed easily.      Past Medical History:   Diagnosis Date    CAD (coronary artery disease)     3 cardiac stents   heart Dr. Elijah Thao Cervical spondylosis without myelopathy 2009    Chronic pain     shoulder left, upper lumbar    Depressive disorder, not elsewhere classified     Diaphragmatic hernia without mention of obstruction or gangrene     GERD (gastroesophageal reflux disease)     Hypercholesterolemia     Hypertension     Hypertrophy of prostate without urinary obstruction and other lower urinary tract symptoms (LUTS) 2008    Insomnia, unspecified 2009    Lumbosacral spondylosis without myelopathy 2009    Osteoarthrosis involving, or with mention of more than one site, but not specified as generalized, multiple sites 2010    Varicose veins 12/11/2014      Past Surgical History:   Procedure Laterality Date    CABG, ARTERY-VEIN, TWO  1/9/12    CABG    HX APPENDECTOMY  1962    HX CATARACT REMOVAL  2015    bilateral    HX COLONOSCOPY  2010    x2 small beign polyps    HX COLONOSCOPY  2017    hyperplastic polyp    HX ENDOSCOPY  2010    acid reflux    HX HEART CATHETERIZATION  1998, 2001    3 stents    HX HEENT  1962    fx nose repair    HX HERNIA REPAIR  early 1980's    left inguinal, with mesh    HX ORTHOPAEDIC  2017    lumbar fusion    HX OTHER SURGICAL  7 yrs. old    Hiatal Hernia    HX TONSILLECTOMY      HX VASCULAR ACCESS      IR INSERT TUNL CVAD W PORT LESS THAN 5 YR  1/23/2019     Prior to Admission medications    Medication Sig Start Date End Date Taking? Authorizing Provider   senna (Senna) 8.6 mg tablet Take 1 Tab by mouth daily. Provider, Historical   cyclobenzaprine (FLEXERIL) 10 mg tablet Take 10 mg by mouth three (3) times daily as needed for Muscle Spasm(s). Provider, Historical   midodrine (PROAMATINE) 2.5 mg tablet Take 2.5 mg by mouth two (2) times a day. Provider, Historical   finasteride (PROSCAR) 5 mg tablet Take 5 mg by mouth daily. Provider, Historical   cyanocobalamin (VITAMIN B12) 500 mcg tablet Take 500 mcg by mouth daily.     Provider, Historical   docusate sodium (DOK) 250 mg capsule Take 750 mg by mouth two (2) times a day. Provider, Historical   ranolazine ER (RANEXA) 500 mg SR tablet Take 1,000 mg by mouth two (2) times a day. 4/30/20   Provider, Historical   DULoxetine (CYMBALTA) 60 mg capsule TAKE 1 CAPSULE BY MOUTH DAILY. INDICATIONS: CHRONIC MUSCLE OR BONE PAIN 4/7/20   Julissa Wilcox MD   ibuprofen (MOTRIN) 200 mg tablet Take 400 mg by mouth daily as needed for Pain. Provider, Historical   nortriptyline (PAMELOR) 25 mg capsule Take 2 Caps by mouth nightly. Patient taking differently: Take 25 mg by mouth nightly. 2/19/20   Julissa Wilcox MD   tamsulosin (FLOMAX) 0.4 mg capsule TAKE 2 CAPSULES BY MOUTH NIGHLTY  Patient taking differently: TAKE 2 CAPSULES BY MOUTH NIGHLTY and 1 AM 1/26/20   Alva Truong MD   ondansetron (ZOFRAN ODT) 4 mg disintegrating tablet Take 1 Tab by mouth every eight (8) hours as needed for Nausea. Indications: Nausea and Vomiting caused by Cancer Drugs 11/8/19   Julissa Wilcox MD   dexAMETHasone (DECADRON) 4 mg tablet Take 8 mg by mouth daily. Indications: Prevent Nausea and Vomiting from Cancer Chemotherapy 9/27/19   Julissa Wilcox MD   rosuvastatin (CRESTOR) 10 mg tablet Take 1 Tab by mouth nightly. Indications: excessive fat in the blood 9/27/19   Julissa Wilcox MD   rivaroxaban (XARELTO) 20 mg tab tablet Take 1 Tab by mouth daily (with breakfast). Indications: a clot in the lung 9/27/19   Julissa Wilcox MD   acetaminophen (TYLENOL EXTRA STRENGTH) 500 mg tablet Take 500 mg by mouth every six (6) hours as needed for Pain. Provider, Historical   albuterol (VENTOLIN HFA) 90 mcg/actuation inhaler Take 2 Puffs by inhalation every four (4) hours as needed for Wheezing. 4/23/19   Malina Richardson MD   LORazepam (ATIVAN) 0.5 mg tablet Take 1 Tab by mouth every four (4) hours as needed for Anxiety. Max Daily Amount: 3 mg.  Indications: Nausea and Vomiting caused by Cancer Drugs 4/3/19   Julissa Wilcox MD   omeprazole (PRILOSEC) 20 mg capsule Take 20 mg by mouth daily. Provider, Historical   polyethylene glycol (MIRALAX) 17 gram/dose powder Take 17 g by mouth daily as needed. Provider, Historical   amLODIPine (NORVASC) 5 mg tablet Take 5 mg by mouth daily. 6/15/18   Provider, Historical   METOPROLOL TARTRATE PO Take 25 mg by mouth two (2) times a day. Provider, Historical   multivitamin (ONE A DAY) tablet Take 1 Tab by mouth daily. Provider, Historical   aspirin 81 mg tablet Take 81 mg by mouth. Provider, Historical   dutasteride (AVODART) 0.5 mg capsule Take 0.5 mg by mouth daily. Provider, Historical     No Known Allergies   Family History   Problem Relation Age of Onset    Hypertension Father     Heart Disease Father     Arthritis-osteo Mother     Cancer Brother 78        tumor in back    Heart Disease Brother     Diabetes Maternal Grandfather     Kidney Disease Maternal Grandfather         SOCIAL HISTORY:  Patient resides at home with spouse. Patient ambulates independently    Smoking history: former smoker. Quit in 1960s  Alcohol history: None        Objective:       Physical Exam:   Patient Vitals for the past 12 hrs:   Temp Pulse Resp BP SpO2   07/30/20 1955 98.4 °F (36.9 °C) 71 16 147/77 98 %     GEN APPEARANCE: Patient resting in bed in NAD  HEENT: Conjunctiva Clear  CVS: RRR, S1, S2; No M/G/R  Neck: supple. No carotid bruits  LUNGS: CTAB; No Wheezes; No Rhonchi: No rales  ABD: Soft; No TTP; + Normoactive BS  EXT: no edema   Skin exam: No gross lesions noted on exposed skin surfaces  MENTAL STATUS: Answers questions appropriately, responds to commands. NEURO: Cranial nerve exam: EOMI, CN V sensory/motor intact, no facial asymmetry noted, patient able to hearl, uvula midline, able to move tongue left/right.  No gross motor or sensory deficits      Data Review:   Recent Results (from the past 24 hour(s))   EKG, 12 LEAD, INITIAL    Collection Time: 07/30/20  1:34 PM   Result Value Ref Range    Ventricular Rate 64 BPM    Atrial Rate 64 BPM    P-R Interval 174 ms    QRS Duration 122 ms    Q-T Interval 412 ms    QTC Calculation (Bezet) 425 ms    Calculated P Axis 41 degrees    Calculated R Axis -59 degrees    Calculated T Axis -46 degrees    Diagnosis       Normal sinus rhythm  Left anterior fascicular block  Left ventricular hypertrophy with QRS widening  ST & T wave abnormality, consider lateral ischemia  Abnormal ECG  When compared with ECG of 23-APR-2019 17:28,  T wave inversion now evident in Inferior leads  T wave inversion now evident in Lateral leads     CBC WITH AUTOMATED DIFF    Collection Time: 07/30/20  1:41 PM   Result Value Ref Range    WBC 3.8 (L) 4.1 - 11.1 K/uL    RBC 3.48 (L) 4.10 - 5.70 M/uL    HGB 12.1 12.1 - 17.0 g/dL    HCT 35.7 (L) 36.6 - 50.3 %    .6 (H) 80.0 - 99.0 FL    MCH 34.8 (H) 26.0 - 34.0 PG    MCHC 33.9 30.0 - 36.5 g/dL    RDW 14.0 11.5 - 14.5 %    PLATELET 448 635 - 056 K/uL    MPV 8.5 (L) 8.9 - 12.9 FL    NRBC 0.0 0  WBC    ABSOLUTE NRBC 0.00 0.00 - 0.01 K/uL    NEUTROPHILS 38 32 - 75 %    LYMPHOCYTES 54 (H) 12 - 49 %    MONOCYTES 6 5 - 13 %    EOSINOPHILS 2 0 - 7 %    BASOPHILS 0 0 - 1 %    IMMATURE GRANULOCYTES 0 0.0 - 0.5 %    ABS. NEUTROPHILS 1.5 (L) 1.8 - 8.0 K/UL    ABS. LYMPHOCYTES 2.1 0.8 - 3.5 K/UL    ABS. MONOCYTES 0.2 0.0 - 1.0 K/UL    ABS. EOSINOPHILS 0.1 0.0 - 0.4 K/UL    ABS. BASOPHILS 0.0 0.0 - 0.1 K/UL    ABS. IMM.  GRANS. 0.0 0.00 - 0.04 K/UL    DF AUTOMATED     PROTHROMBIN TIME + INR    Collection Time: 07/30/20  1:41 PM   Result Value Ref Range    INR 1.3 (H) 0.9 - 1.1      Prothrombin time 12.7 (H) 9.0 - 62.3 sec   METABOLIC PANEL, COMPREHENSIVE    Collection Time: 07/30/20  1:41 PM   Result Value Ref Range    Sodium 130 (L) 136 - 145 mmol/L    Potassium 4.6 3.5 - 5.1 mmol/L    Chloride 96 (L) 97 - 108 mmol/L    CO2 26 21 - 32 mmol/L    Anion gap 8 5 - 15 mmol/L    Glucose 97 65 - 100 mg/dL    BUN 15 6 - 20 MG/DL    Creatinine 0.90 0.70 - 1.30 MG/DL BUN/Creatinine ratio 17 12 - 20      GFR est AA >60 >60 ml/min/1.73m2    GFR est non-AA >60 >60 ml/min/1.73m2    Calcium 8.6 8.5 - 10.1 MG/DL    Bilirubin, total 0.5 0.2 - 1.0 MG/DL    ALT (SGPT) 29 12 - 78 U/L    AST (SGOT) 22 15 - 37 U/L    Alk. phosphatase 70 45 - 117 U/L    Protein, total 6.5 6.4 - 8.2 g/dL    Albumin 3.3 (L) 3.5 - 5.0 g/dL    Globulin 3.2 2.0 - 4.0 g/dL    A-G Ratio 1.0 (L) 1.1 - 2.2     MAGNESIUM    Collection Time: 07/30/20  1:41 PM   Result Value Ref Range    Magnesium 2.0 1.6 - 2.4 mg/dL   TROPONIN I    Collection Time: 07/30/20  1:41 PM   Result Value Ref Range    Troponin-I, Qt. <0.05 <0.05 ng/mL   NT-PRO BNP    Collection Time: 07/30/20  1:41 PM   Result Value Ref Range    NT pro- 0 - 450 PG/ML     Assessment:     Active Problems:    Syncope (7/30/2020)        Plan:     1. Syncope: cardiac origin given worsenign LEVY vs orthostatics. - normal Troponin, ECG just showed non specific t wave changes. No arrhythmia noted. CT of the head negative. - CTA of the chest given worsening SOB but low suspicion for pe given pt is xarelto. - Echo in am  - Orthostatic vital signs now  - Cardiac telemetry  - Carotid doppler.  - Cardiology consult  - check tsh      2. Hypertension: stable. Continue home medication    3. H/o CAD: no acute issues. 4. History of PE: on xarelto  - will continue. 5. Hyponatremia: Mild, could be related to poor PO intake. 6. Pancreatitic cancer: on chemo. No acute issues  - f/u with oncology. 7. BPH: continue with home meds. DVT prophy: on xarelto  Code status; full code    FUNCTIONAL STATUS PRIOR TO HOSPITALIZATION Ambulates Independently (including history of recent falls): a week ago.     Signed By: Rodgers Curling, MD     July 30, 2020

## 2020-07-31 NOTE — PROGRESS NOTES
Hospitalist Progress Note      Hospital summary: Carter Galo is a [de-identified] y.o. male with past medical history significant for pancreatic cancer on chemotherapy, CAD, hypertension presented to the emergency room after having several episodes today of dizziness/diet lightheadedness while standing. Patient states that for the last several weeks he has episode of dizziness resulting in passing out. Episodes can happen during exertion and at rest. Denies chest pain, SOB, palpitations, nausea prior to syncope episode. He was seen by his cardiologist who started on Midodrine for orthostatic hypotension. He reports compliance with meds but still having symptoms. He has been experiencing increased LEVY for several weeks now. No cough or pain. 7/30/2020      Assessment/Plan:  Syncope - unclear etiology yet  -  cardiac origin given worsening LEVY vs orthostatics. - normal Troponin, ECG just showed non specific t wave changes. No arrhythmia noted. - CT of the head negative. - CTA chest: no PE.  - Echo: 30 - 35%. Abnormal left ventricular strain. There is a possible mass noted in the right atrium  - Orthostatic positive  - Carotid doppler: Consistent with less than 50% stenosis of the right and left internal carotid   - Cardiology on board   - increased midodrine 5mg tid     Systolic CHF with NYHA class III on poa  Hx CAD s/p CABG jn 2012  - Echo with EF 30-35%  - cardiology started on metoprolol  - pt symptomatic and requesting cardiology opinion on cardiac cath     - c/w Ranexa      Hypertension: BP stable. Continue home medication     Hx  PE: on xarelto  Hyponatremia: not improving, urine lytes and osm ordered. will monitor      Pancreatitic cancer: on chemo. f/u with oncology outpt   BPH: continue with home meds finasteride, flomax     Code status: Full  DVT prophylaxis: Xarelto  Disposition: TBD.  Home when ready  ----------------------------------------------    CC: Syncope    S: Patient is seen and examined at bedside this morning. He feels ok now.he c/o sob with minimal exertion. No chest pain. Discussed with cardiology Dr. HEARN [facility] ( patient's cardiology), his last echo at office showed EF 35% but he was not symptomatic with sob. She also recommends event monitor on discharge. Review of Systems:  A comprehensive review of systems was negative.     O:  Visit Vitals  /52 (BP 1 Location: Left arm, BP Patient Position: At rest)   Pulse 98   Temp 98.5 °F (36.9 °C)   Resp 24   Ht 6' (1.829 m)   Wt 81.6 kg (180 lb)   SpO2 95%   BMI 24.41 kg/m²       PHYSICAL EXAM:  Gen: NAD  HEENT: anicteric sclerae, normal conjunctiva, oropharynx clear, MM moist  Neck: supple, trachea midline, no adenopathy  Heart: RRR, no MRG, no JVD, no peripheral edema  Lungs: CTA b/l, non-labored respirations  Abd: soft, NT, ND, BS+, no organomegaly  Extr: warm  Skin: dry, no rash  Neuro: CN II-XII grossly intact, normal speech, moves all extremities  Psych: normal mood, appropriate affect      Intake/Output Summary (Last 24 hours) at 7/31/2020 1628  Last data filed at 7/31/2020 0030  Gross per 24 hour   Intake 150 ml   Output    Net 150 ml        Recent labs & imaging reviewed:  Recent Results (from the past 24 hour(s))   DUPLEX CAROTID BILATERAL    Collection Time: 07/31/20 11:48 AM   Result Value Ref Range    Right cca dist sys 70.7 cm/s    Right CCA dist collazo 11.3 cm/s    Right CCA prox sys 100.8 cm/s    Right CCA prox collazo 12.1 cm/s    Right eca sys 100.3 cm/s    RIGHT EXTERNAL CAROTID ARTERY D 0.00 cm/s    Right ICA dist sys 62.6 cm/s    Right ICA dist collazo 20.2 cm/s    Right ICA mid sys 67.2 cm/s    Right ICA mid collazo 18.6 cm/s    Right ICA prox sys 72.1 cm/s    Right ICA prox collazo 15.8 cm/s    Right vertebral sys 50.0 cm/s    RIGHT VERTEBRAL ARTERY D 14.22 cm/s    Right ICA/CCA sys 1.0     Left CCA dist sys 62.2 cm/s    Left CCA dist collazo 13.4 cm/s    Left CCA prox sys 66.6 cm/s    Left CCA prox collazo 10.7 cm/s Left ECA sys 86.4 cm/s    LEFT EXTERNAL CAROTID ARTERY D 5.69 cm/s    Left ICA dist sys 74.2 cm/s    Left ICA dist collazo 23.7 cm/s    Left ICA mid sys 89.7 cm/s    Left ICA mid collazo 28.9 cm/s    Left ICA prox sys 120.0 cm/s    Left ICA prox collazo 36.0 cm/s    Left vertebral sys 42.2 cm/s    LEFT VERTEBRAL ARTERY D 13.03 cm/s    Left ICA/CCA sys 1.93    ECHO ADULT COMPLETE    Collection Time: 07/31/20 12:07 PM   Result Value Ref Range    IVSd 1.22 (A) 0.6 - 1.0 cm    LVIDd 3.67 (A) 4.2 - 5.9 cm    LVIDs 3.61 cm    LVPWd 1.05 (A) 0.6 - 1.0 cm    BP EF 9.4 (A) 55 - 100 percent    LV Ejection Fraction MOD 2C 33 percent    LV Ejection Fraction MOD 4C 8 percent    LV ED Vol A2C 85.11 mL    LV ED Vol A4C 94.65 mL    LV ED Vol BP 89.84 67 - 155 mL    LV ES Vol A2C 113.10 mL    LV ES Vol A4C 86.93 mL    LV ES Vol BP 98.26 (A) 22 - 58 mL    LVOT Peak Gradient 2.08 mmHg    LVOT Peak Velocity 72.16 cm/s    Left Atrium to Aortic Root Ratio 1.56     AoV PG 5.34 mmHg    Aortic Valve Systolic Peak Velocity 458.18 cm/s    Tapse 1.11 (A) 1.5 - 2.0 cm    Triscuspid Valve Regurgitation Peak Gradient 23.72 mmHg    TR Max Velocity 243.53 cm/s    Ao Root D 4.10 cm    LV Mass .8 88 - 224 g    LV Mass AL Index 65.7 49 - 115 g/m2    LVES Vol Index BP 48.2 mL/m2    LVED Vol Index BP 44.1 mL/m2    LVED Vol Index A4C 46.5 mL/m2    LVED Vol Index A2C 41.8 mL/m2    LVES Vol Index A4C 42.7 mL/m2    LVES Vol Index A2C 55.5 mL/m2     Recent Labs     07/30/20  1341   WBC 3.8*   HGB 12.1   HCT 35.7*        Recent Labs     07/30/20  1341   *   K 4.6   CL 96*   CO2 26   BUN 15   CREA 0.90   GLU 97   CA 8.6   MG 2.0     Recent Labs     07/30/20  1341   ALT 29   AP 70   TBILI 0.5   TP 6.5   ALB 3.3*   GLOB 3.2     Recent Labs     07/30/20  1341   INR 1.3*   PTP 12.7*      No results for input(s): FE, TIBC, PSAT, FERR in the last 72 hours.    No results found for: FOL, RBCF   No results for input(s): PH, PCO2, PO2 in the last 72 hours.   Recent Labs     07/30/20  1341   TROIQ <0.05     Lab Results   Component Value Date/Time    Cholesterol, total 131 06/12/2020 08:34 AM    HDL Cholesterol 48 06/12/2020 08:34 AM    LDL, calculated 58 06/12/2020 08:34 AM    Triglyceride 126 06/12/2020 08:34 AM     Lab Results   Component Value Date/Time    Glucose (POC) 112 (H) 01/12/2012 06:32 AM    Glucose (POC) 138 (H) 01/11/2012 09:43 PM    Glucose (POC) 148 (H) 01/11/2012 05:55 PM    Glucose (POC) 181 (H) 01/11/2012 03:35 PM    Glucose (POC) 110 01/11/2012 12:29 PM     Lab Results   Component Value Date/Time    Color YELLOW/STRAW 09/21/2016 01:48 PM    Appearance CLEAR 09/21/2016 01:48 PM    Specific gravity 1.010 09/21/2016 01:48 PM    pH (UA) 7.5 09/21/2016 01:48 PM    Protein NEGATIVE  09/21/2016 01:48 PM    Glucose NEGATIVE  09/21/2016 01:48 PM    Ketone NEGATIVE  09/21/2016 01:48 PM    Bilirubin NEGATIVE  09/21/2016 01:48 PM    Urobilinogen 0.2 09/21/2016 01:48 PM    Nitrites NEGATIVE  09/21/2016 01:48 PM    Leukocyte Esterase NEGATIVE  09/21/2016 01:48 PM    Epithelial cells FEW 09/21/2016 01:48 PM    Bacteria NEGATIVE  09/21/2016 01:48 PM    WBC 0-4 09/21/2016 01:48 PM    RBC 0-5 09/21/2016 01:48 PM       Med list reviewed  Current Facility-Administered Medications   Medication Dose Route Frequency    aspirin delayed-release tablet 81 mg  81 mg Oral DAILY    DULoxetine (CYMBALTA) capsule 60 mg  60 mg Oral DAILY    finasteride (PROSCAR) tablet 5 mg  5 mg Oral DAILY    LORazepam (ATIVAN) tablet 0.5 mg  0.5 mg Oral Q4H PRN    nortriptyline (PAMELOR) capsule 50 mg  50 mg Oral QHS    ranolazine ER (RANEXA) tablet 1,000 mg  1,000 mg Oral Q12H    rivaroxaban (XARELTO) tablet 20 mg  20 mg Oral DAILY WITH BREAKFAST    rosuvastatin (CRESTOR) tablet 10 mg  10 mg Oral QHS    senna (SENOKOT) tablet 8.6 mg  1 Tab Oral DAILY    tamsulosin (FLOMAX) capsule 0.8 mg  0.8 mg Oral DAILY    midodrine (PROAMATINE) tablet 5 mg  5 mg Oral TID WITH MEALS    [START ON 8/1/2020] metoprolol succinate (TOPROL-XL) XL tablet 12.5 mg  12.5 mg Oral DAILY    sodium chloride (NS) flush 5-40 mL  5-40 mL IntraVENous Q8H    sodium chloride (NS) flush 5-40 mL  5-40 mL IntraVENous PRN       Care Plan discussed with:  Patient/Family, Nurse and     Katiana Fairchild MD  Internal Medicine  Date of Service: 7/31/2020

## 2020-07-31 NOTE — CONSULTS
Cardiology Note dictated #  [id number]  Imp:  Orthostatic hypotension. Refractory to treatment with midodrine at the 2.5 BID dose started 1 week ago. Possibly aggravated by his nortriptyline or his tamsulosin. CAD: CABG x 2 1/9/12: LIMA to LAD and SVG to OM1 ; MPI 3/20/2020 showed inferior infarct with no residual ischemia, EF 37%; CTA from 4/29/2020 showed SVG is occluded. Chronic systolic HF: EF 68% by echo 4/29/2020 with aortic valve sclerosis w/o stenosis. Volume status: euvolemic  Evaluation here shows no PE ;  NT pro-  Recommendations:  Increase midodrine to 5 mg TID with meals  Liberalize his dietary salt  Screen for adrenal insufficiency  Avoid diuretics if possible although his dyspnea is fairly profound and is EF is low. Can give a trial of metoprolol succinate for his HF/GDMT with parameters. If his orthostatic hypotension remains a problem, may need to reassess the use of nortriptyline and or tamsulosin. No need to repeat echo at this time since he had one at the end of April 2020 so will cancel. Further recommendations to follow.   Thank you for this referral.  Mony Winchester MD  Interventional Cardiology  [facility]

## 2020-07-31 NOTE — CONSULTS
[address]    Name:  Jorge Jimenes  MR#:  [id number]  :  1940  ACCOUNT #:  [de-identified]  DATE OF SERVICE:  2020    REFERRING PHYSICIAN:  Dr. Wilian Martinez. HISTORY OF PRESENT ILLNESS:  The patient was admitted to the hospital with a chief complaint of recurrent dizzy spells and significant orthostatic hypotension. His blood pressure dropped today, documented a 40-point drop in the systolic when he stood. The patient has had a problem with this very recently and was placed on midodrine 2.5 mg twice a day by Dr. Tank Saravia one week ago. This has not seemed to have helped his problem. He has significant other medical problems including coronary artery disease and a two-vessel bypass surgery in  including LIMA to LAD and SVG to OM1. Recent CTA of his coronaries demonstrated that the SVG was occluded. A nuclear stress test done prior to his CTA demonstrated no ischemia with inferior infarction, ejection fraction 37%. An echocardiogram done around the same time also confirmed the ejection fraction at 35% to 37%. The patient's other issue was dyspnea with even short distances. PAST MEDICAL HISTORY:  Other medical problems include hyperlipidemia, hypertension. He has been treated for pancreatic cancer at [facility] with chemo. He had a pulmonary embolus in 2019. Back pain. ALLERGIES:  NO KNOWN ALLERGIES. REVIEW OF SYSTEMS:  Negative for chest pain, orthopnea, diaphoresis, unexplained weight loss, edema or claudication, any nausea or vomiting or bleeding per urine or stool, any fever or chills or cough. PHYSICAL EXAMINATION:  GENERAL:  This is a well-developed, alert, elderly gentleman in no distress. VITAL SIGNS:  As follows:  Blood pressure is 125/82. Temperature normal, respirations 14, sats 94% on room air. HEENT:  Unremarkable. NECK:  Supple. No adenopathy. CHEST:  Chest wall, he has a healed sternotomy. LUNGS:  Clear.   HEART: Regular rhythm, moderate rate. No S3 gallop or friction rub. No thrills, lifts, or heaves. ABDOMEN:  Soft, nontender. No bruits, no ascites or palpable masses. NEUROLOGIC:  The patient is awake, alert, and appropriate. Normal speech. No focal motor signs detected. LABORATORY AND DIAGNOSTIC DATA:  His EKG demonstrates sinus rhythm, left anterior hemiblock, left ventricular hypertrophy, lateral T-wave changes, which are new compared to the 04/23/2019 tracing. CT scan of his chest was negative for pulmonary embolus. He is getting carotid Dopplers done now. His lab work, normal hemoglobin and hematocrit. White count is slightly down at 3.8. Platelets are normal.  Chemistries:  BUN and creatinine are normal.  Potassium is 4.6. His NT-proBNP is 194. IMPRESSION:  1. The patient has pretty classic orthostatic hypotension. Clearly, there are very low blood pressures in his chart when he changed position to standing and, so far, he has not responded to the midodrine as prescribed by Dr. Yonathan Root on an outpatient basis. 2.  The patient has dyspnea with short distances on any exertion. His ejection fraction is moderately reduced. His NT-proBNP was normal and CTA of the coronaries demonstrated that the vein graft to the obtuse marginal branch was occluded, the chronicity of this is not known. RECOMMENDATIONS:  1. Increase his midodrine to 5 mg t.i.d. with meals. 2.  Liberalize salt in his diet. 3.  Avoid diuretics. 4.  If his orthostatic changes remain a problem or persistent, we may need to reassess the use of nortriptyline. Further recommendations to follow.     Thank you for this referral.      Yohan Madera MD      [id number]  D:  07/31/2020 12:12  T:  07/31/2020 14:08  JOB #:  [id number]

## 2020-07-31 NOTE — PROGRESS NOTES
Problem: Syncope  Goal: *Absence of injury  Outcome: Progressing Towards Goal  Goal: Decrease or eliminate episodes of syncope  Outcome: Progressing Towards Goal     Problem: Patient Education: Go to Patient Education Activity  Goal: Patient/Family Education  Outcome: Progressing Towards Goal     Problem: Falls - Risk of  Goal: *Absence of Falls  Description: Document Shira Alhambra Fall Risk and appropriate interventions in the flowsheet.   Outcome: Progressing Towards Goal  Note: Fall Risk Interventions:  Mobility Interventions: Communicate number of staff needed for ambulation/transfer, Patient to call before getting OOB         Medication Interventions: Evaluate medications/consider consulting pharmacy, Patient to call before getting OOB, Teach patient to arise slowly    Elimination Interventions: Call light in reach    History of Falls Interventions: Room close to nurse's station, Door open when patient unattended         Problem: Patient Education: Go to Patient Education Activity  Goal: Patient/Family Education  Outcome: Progressing Towards Goal

## 2020-07-31 NOTE — PROGRESS NOTES
Bedside and Verbal shift change report given to Tyler Bingham RN (oncoming nurse) by Sharifa Ochoa RN (offgoing nurse). Report included the following information SBAR, Kardex, Intake/Output, Recent Results, Cardiac Rhythm NSR and Dual Neuro Assessment.

## 2020-07-31 NOTE — ROUTINE PROCESS
TRANSFER - OUT REPORT: 
 
Verbal report given to Rosana Gomes on Calvin Molina  being transferred to [facility]  for routine progression of care Report consisted of patients Situation, Background, Assessment and  
Recommendations(SBAR). Information from the following report(s) SBAR, ED Summary, [facility] and Recent Results was reviewed with the receiving nurse. Lines:  
Venous Access Device [employer] lot [id number] 01/23/19 Upper chest (subclavicular area, right (Active) Peripheral IV 07/30/20 Right Arm (Active) Site Assessment Clean, dry, & intact 07/30/20 1512 Phlebitis Assessment 0 07/30/20 1512 Infiltration Assessment 0 07/30/20 1512 Dressing Status Clean, dry, & intact 07/30/20 1512 Dressing Type Transparent 07/30/20 1512 Hub Color/Line Status Pink 07/30/20 1512 Alcohol Cap Used No 07/30/20 1512 Opportunity for questions and clarification was provided.    
 
 
 
 
\

## 2020-07-31 NOTE — PROGRESS NOTES
Transition of Care Plan:                    TBD; Pending medical work-up. Cardiology consulted; Awaiting recommendations. DISPOSITION--Return home. No anticipated needs identified   Radha Gentile [phone]    [employer]. Cofirmed demographics and insurance coverage. Resides with spouse in a two level home with 5 steps to enter. Independent with ADLs/IADLs including driving. Uses a walker as needed. F[facility] Rx [facility] ([facility]). Reason for Admission:   Syncope; Observation status                    RUR Score:          Not assigned            Plan for utilizing home health:      Not indicated at this time     PCP: First and Last name:  Cedric Holley MD   Name of Practice:    Are you a current patient: Yes/No: Yes   Approximate date of last visit: Fall 2019   Can you participate in a virtual visit with your PCP: No                    Current Advanced Directive/Advance Care Plan: Has ACP. Patient will bring it in                           Care Management Interventions  PCP Verified by CM: Yes  Palliative Care Criteria Met (RRAT>21 & CHF Dx)?: No  Mode of Transport at Discharge: Other (see comment)  Transition of Care Consult (CM Consult): Discharge Planning  Discharge Durable Medical Equipment: No  Physical Therapy Consult: No  Occupational Therapy Consult: No  Speech Therapy Consult: No  Current Support Network: Lives with Spouse  Discharge Location  Discharge Placement: Home    Observation notice provided in writing to patient and/or caregiver as well as verbal explanation of the policy. Patients who are in outpatient status also receive the Observation notice. CM available in case needs arise.  Michaelle Vázquez MSW,CRM

## 2020-08-01 LAB
ANION GAP SERPL CALC-SCNC: 8 MMOL/L (ref 5–15)
ATRIAL RATE: 138 BPM
BUN SERPL-MCNC: 14 MG/DL (ref 6–20)
BUN/CREAT SERPL: 17 (ref 12–20)
CALCIUM SERPL-MCNC: 7.9 MG/DL (ref 8.5–10.1)
CALCULATED R AXIS, ECG10: -58 DEGREES
CALCULATED T AXIS, ECG11: 66 DEGREES
CHLORIDE SERPL-SCNC: 105 MMOL/L (ref 97–108)
CO2 SERPL-SCNC: 21 MMOL/L (ref 21–32)
CREAT SERPL-MCNC: 0.83 MG/DL (ref 0.7–1.3)
DIAGNOSIS, 93000: NORMAL
ERYTHROCYTE [DISTWIDTH] IN BLOOD BY AUTOMATED COUNT: 13.5 % (ref 11.5–14.5)
GLUCOSE SERPL-MCNC: 134 MG/DL (ref 65–100)
HCT VFR BLD AUTO: 32 % (ref 36.6–50.3)
HGB BLD-MCNC: 10.8 G/DL (ref 12.1–17)
MAGNESIUM SERPL-MCNC: 2 MG/DL (ref 1.6–2.4)
MCH RBC QN AUTO: 34.5 PG (ref 26–34)
MCHC RBC AUTO-ENTMCNC: 33.8 G/DL (ref 30–36.5)
MCV RBC AUTO: 102.2 FL (ref 80–99)
NRBC # BLD: 0 K/UL (ref 0–0.01)
NRBC BLD-RTO: 0 PER 100 WBC
PLATELET # BLD AUTO: 294 K/UL (ref 150–400)
PMV BLD AUTO: 9 FL (ref 8.9–12.9)
POTASSIUM SERPL-SCNC: 4.1 MMOL/L (ref 3.5–5.1)
Q-T INTERVAL, ECG07: 330 MS
QRS DURATION, ECG06: 126 MS
QTC CALCULATION (BEZET), ECG08: 503 MS
RBC # BLD AUTO: 3.13 M/UL (ref 4.1–5.7)
SODIUM SERPL-SCNC: 134 MMOL/L (ref 136–145)
VENTRICULAR RATE, ECG03: 140 BPM
WBC # BLD AUTO: 2.9 K/UL (ref 4.1–11.1)

## 2020-08-01 PROCEDURE — 97161 PT EVAL LOW COMPLEX 20 MIN: CPT

## 2020-08-01 PROCEDURE — 74011250637 HC RX REV CODE- 250/637: Performed by: INTERNAL MEDICINE

## 2020-08-01 PROCEDURE — 97116 GAIT TRAINING THERAPY: CPT

## 2020-08-01 PROCEDURE — 97535 SELF CARE MNGMENT TRAINING: CPT

## 2020-08-01 PROCEDURE — 97530 THERAPEUTIC ACTIVITIES: CPT

## 2020-08-01 PROCEDURE — 97165 OT EVAL LOW COMPLEX 30 MIN: CPT

## 2020-08-01 PROCEDURE — 65660000000 HC RM CCU STEPDOWN

## 2020-08-01 RX ADMIN — Medication 10 ML: at 22:12

## 2020-08-01 RX ADMIN — ROSUVASTATIN 10 MG: 10 TABLET, FILM COATED ORAL at 22:12

## 2020-08-01 RX ADMIN — DULOXETINE HYDROCHLORIDE 60 MG: 60 CAPSULE, DELAYED RELEASE ORAL at 08:48

## 2020-08-01 RX ADMIN — Medication 10 ML: at 05:29

## 2020-08-01 RX ADMIN — Medication 10 ML: at 13:46

## 2020-08-01 RX ADMIN — METOPROLOL SUCCINATE 12.5 MG: 25 TABLET, EXTENDED RELEASE ORAL at 08:48

## 2020-08-01 RX ADMIN — MIDODRINE HYDROCHLORIDE 5 MG: 5 TABLET ORAL at 16:52

## 2020-08-01 RX ADMIN — MIDODRINE HYDROCHLORIDE 5 MG: 5 TABLET ORAL at 12:28

## 2020-08-01 RX ADMIN — RANOLAZINE 1000 MG: 500 TABLET, FILM COATED, EXTENDED RELEASE ORAL at 22:12

## 2020-08-01 RX ADMIN — FINASTERIDE 5 MG: 5 TABLET, FILM COATED ORAL at 08:48

## 2020-08-01 RX ADMIN — TAMSULOSIN HYDROCHLORIDE 0.8 MG: 0.4 CAPSULE ORAL at 08:48

## 2020-08-01 RX ADMIN — RANOLAZINE 1000 MG: 500 TABLET, FILM COATED, EXTENDED RELEASE ORAL at 08:53

## 2020-08-01 RX ADMIN — Medication 8.6 MG: at 08:48

## 2020-08-01 RX ADMIN — MIDODRINE HYDROCHLORIDE 5 MG: 5 TABLET ORAL at 08:48

## 2020-08-01 RX ADMIN — NORTRIPTYLINE HYDROCHLORIDE 50 MG: 25 CAPSULE ORAL at 22:12

## 2020-08-01 RX ADMIN — ASPIRIN 81 MG: 81 TABLET, COATED ORAL at 08:48

## 2020-08-01 RX ADMIN — RIVAROXABAN 20 MG: 20 TABLET, FILM COATED ORAL at 08:48

## 2020-08-01 NOTE — PROGRESS NOTES
Problem: Mobility Impaired (Adult and Pediatric)  Goal: *Acute Goals and Plan of Care (Insert Text)  Description: FUNCTIONAL STATUS PRIOR TO ADMISSION: Patient was modified independent using a rolling walker for functional mobility. HOME SUPPORT PRIOR TO ADMISSION: The patient lived with wife but did not require assist.    Physical Therapy Goals  Initiated 8/1/2020  1. Patient will move from supine to sit and sit to supine  in bed with independence within 7 day(s). 2.  Patient will transfer from bed to chair and chair to bed with independence using the least restrictive device within 7 day(s). 3.  Patient will perform sit to stand with independence within 7 day(s). 4.  Patient will ambulate with independence for 500 feet with the least restrictive device within 7 day(s). 5.  Patient will ascend/descend 5 stairs with bilateral handrail(s) with independence within 7 day(s). Outcome: Progressing Towards Goal     PHYSICAL THERAPY EVALUATION  Patient: Bessie Reese (28 y.o. male)  Date: 8/1/2020  Primary Diagnosis: Syncope [R55]  Syncope [R55]        Precautions: orthostatic hypotension, fall risk, afib       ASSESSMENT  Based on the objective data described below, the patient presents with some mobility deficits limited by orthostatic drops and mild complaints of lightheadedness. Patient with a significant PMH including an extensive cardiac history. This male patient demonstrates need for monitoring of BP during all change in position and while ambulating as he is sometimes asymptomatic and unaware of orthostatic BP drops. Patient also with complaints of shortness of breath and reduced activity tolerance. Patient demonstrates need for CGA for ambulation with RW at this time with cues for decreased speed and increased stride length. Patient is independent with transfers and bed mobility at this time however due to syncopal episodes, chair alarm stays in place.  Continued PT to monitor BP changes during mobility and to return home safely. Current Level of Function Impacting Discharge (mobility/balance): CGA for ambulation with RW (PLOF I), will need to trial stairs before discharge home    Functional Outcome Measure: The patient scored 54 on the ALEJO balance outcome measure which is indicative of very low risk for falls. Other factors to consider for discharge: orthostatic hypotension, recent atrial fibrillation episode     Patient will benefit from skilled therapy intervention to address the above noted impairments. PLAN :  Recommendations and Planned Interventions: gait training and neuromuscular re-education      Frequency/Duration: Patient will be followed by physical therapy:  5 times a week to address goals. Recommendation for discharge: (in order for the patient to meet his/her long term goals)  Physical therapy at least 2 days/week in the home AND ensure assist and/or supervision for safety with ambulation    This discharge recommendation:  Has not yet been discussed the attending provider and/or case management    IF patient discharges home will need the following DME: patient owns DME required for discharge         SUBJECTIVE:   Patient stated I just want to be active, I wish I did not have to deal with this cardiac stuff.     OBJECTIVE DATA SUMMARY:   HISTORY:    Past Medical History:   Diagnosis Date    CAD (coronary artery disease)     3 cardiac stents   heart Dr. Aki Dominguez Cervical spondylosis without myelopathy 2009    Chronic pain     shoulder left, upper lumbar    Depressive disorder, not elsewhere classified     Diaphragmatic hernia without mention of obstruction or gangrene     GERD (gastroesophageal reflux disease)     Hypercholesterolemia     Hypertension     Hypertrophy of prostate without urinary obstruction and other lower urinary tract symptoms (LUTS) 2008    Insomnia, unspecified 2009    Lumbosacral spondylosis without myelopathy 2009    Osteoarthrosis involving, or with mention of more than one site, but not specified as generalized, multiple sites 2010    Varicose veins 12/11/2014     Past Surgical History:   Procedure Laterality Date    CABG, ARTERY-VEIN, TWO  1/9/12    CABG    HX APPENDECTOMY  1962    HX CATARACT REMOVAL  2015    bilateral    HX COLONOSCOPY  2010    x2 small beign polyps    HX COLONOSCOPY  2017    hyperplastic polyp    HX ENDOSCOPY  2010    acid reflux    [address]    3 stents    HX HEENT  1962    fx nose repair    HX HERNIA REPAIR  early 1980's    left inguinal, with mesh    HX ORTHOPAEDIC  2017    lumbar fusion    HX OTHER SURGICAL  7 yrs. old    Hiatal Hernia    HX TONSILLECTOMY      HX VASCULAR ACCESS      IR INSERT TUNL CVAD W PORT LESS THAN 5 YR  1/23/2019       Personal factors and/or comorbidities impacting plan of care: extensive PMH including cardiac PMH    Home Situation  Home Environment: Private residence  # Steps to Enter: 5  Rails to Enter: Yes  Hand Rails : Right  One/Two Story Residence: Two story, live on 1st floor  Living Alone: No  Support Systems: Child(brandon), Family member(s)  Patient Expects to be Discharged to[de-identified] Private residence  Current DME Used/Available at Home: Walker, rolling  Tub or Shower Type: Shower    EXAMINATION/PRESENTATION/DECISION MAKING:   Critical Behavior:  Neurologic State: Alert  Orientation Level: Oriented X4  Cognition: Appropriate decision making, Appropriate for age attention/concentration     Hearing: Auditory  Auditory Impairment: Hard of hearing, bilateral    Range Of Motion:  AROM: Within functional limits    Strength:    Strength:  Within functional limits       Tone & Sensation:   Tone: Normal       Sensation: Intact        Coordination:  Coordination: Within functional limits  Vision:   Acuity: Within Defined Limits  Corrective Lenses: Glasses  Functional Mobility:  Bed Mobility:  Rolling: Independent  Supine to Sit: Independent  Sit to Supine: Independent  Scooting: Independent  Transfers:  Sit to Stand: Independent  Stand to Sit: Independent  Stand Pivot Transfers: Independent       Balance:   Sitting: Intact  Standing: Intact  Ambulation/Gait Training:  Distance (ft): 300 Feet (ft)  Assistive Device: Walker, rolling  Ambulation - Level of Assistance: Contact guard assistance       Base of Support: Widened     Speed/Moon: Accelerated        Some cues needed for decreased speed, increased stride length, and to promote awareness of fatigue and lightheadedness    Stairs:  Not attempted on evaluation secondary to fatigue.  Will need to attempt before returning home    Functional Measure:  [place] Balance Test:    Sitting to Standin  Standing Unsupported: 4  Sitting with Back Unsupported: 4  Standing to Sittin  Transfers: 4  Standing Unsupported with Eyes Closed: 4  Standing Unsupported with Feet Together: 4  Reach Forward with Outstretched Arm: 4   Object: 4  Turn to Look Over Shoulders: 4  Turn 360 Degrees: 4  Alternate Foot on Step/Stool: 3  Standing Unsupported One Foot in Front: 3  Stand on One Le  Total: 54/56         56=Maximum possible score;   0-20=High fall risk  21-40=Moderate fall risk   41-56=Low fall risk          Physical Therapy Evaluation Charge Determination   History Examination Presentation Decision-Making   HIGH Complexity :3+ comorbidities / personal factors will impact the outcome/ POC  LOW Complexity : 1-2 Standardized tests and measures addressing body structure, function, activity limitation and / or participation in recreation  LOW Complexity : Stable, uncomplicated  LOW Complexity : FOTO score of       Based on the above components, the patient evaluation is determined to be of the following complexity level: LOW     Pain Ratin/10    Activity Tolerance:   Good, requires rest breaks and signs and symptoms of orthostatic hypotension  Please refer to the flowsheet for vital signs taken during this treatment. Patient Vitals for the past 8 hrs:   Temp Pulse Resp BP SpO2   08/01/20 1356 98.2 °F (36.8 °C) 92 18 114/66 96 %   08/01/20 1246  (!) 102  131/84    08/01/20 1241  (!) 120  (!) 88/71    08/01/20 1236    107/76    08/01/20 1228  93  (!) 89/65    08/01/20 1221  84  120/75    08/01/20 0951 98.3 °F (36.8 °C) 88 21 113/72 93 %   08/01/20 0848  91  128/74          After treatment patient left in no apparent distress:   Sitting in chair, Call bell within reach, Bed / chair alarm activated and Caregiver / family present    COMMUNICATION/EDUCATION:   The patients plan of care was discussed with: Occupational therapist and Registered nurse. Fall prevention education was provided and the patient/caregiver indicated understanding. and Patient/family agree to work toward stated goals and plan of care.     Thank you for this referral.  Espiridion Babinski, PT   Time Calculation: 38 mins

## 2020-08-01 NOTE — PROGRESS NOTES
RN responded to patient presenting with accelerated HR on telemetry (HR>140). EKG performed on patient. EKG demonstrates patient in Afib c RVR. Patient denies being SOB or pain. Provider contacted. Lab work ordered, as well IV metoprolol. 5 mg IV metoprolol given . Ioana Hind ... patient converted to normal SR. Patient did experience > 4 sec pause during conversion to SR. No further incidents.

## 2020-08-01 NOTE — PROGRESS NOTES
OCCUPATIONAL THERAPY EVALUATION/DISCHARGE  Patient: Robyn Velasco (81 y.o. male)  Date: 8/1/2020  Primary Diagnosis: Syncope [R55]  Syncope [R55]       Precautions: fall       ASSESSMENT  Patient presents with orthostatic hypotension s/p admission for syncope, also with acute AF + RVR returning to [facility] ([place]). Noted patient also with history of CAD + CABG and systolic CHF. Patient reports 2 recent falls d/t syncope preceded by dizziness but no mechanical falls. In OT eval today, patient was positive for orthostatic hypotension (asymptomatic, see vitals chart below), and was receptive to compensatory strategies/ fall prevention. He demonstrates good understanding of education and good safety awareness. Patient with intact AROM/ strength/ coordination and intact balance, and ambulated 300' with RW (which he has been using PTA). Patient does not require additional OT services at this time. Recommend d/c home when medically stable.                  Vitals:     8/01/20 08/01/20 1228 08/01/20 1236 08/01/20 1241 08/01/20 1246   BP: 120/75 (!) 89/65 107/76 (!) 88/71 131/84   BP 1 Location: Right arm Right arm Right arm Right arm Right arm   BP Patient Position: Supine Standing Standing  Comment: after seated rest break + BLE AROM/ marching in place Standing  Comment: after walking ~300 ft, asymptomatic Post activity; Sitting   Pulse: 84 93   (!) 120 (!) 102         Current Level of Function (ADLs/self-care): modified independent    Functional Outcome Measure: The patient scored 100/100 on the Barthel outcome measure which is indicative of no impairment in functional performance     PLAN :    Recommendation for discharge: (in order for the patient to meet his/her long term goals)  No skilled occupational therapy/ follow up rehabilitation needs identified at this time.     This discharge recommendation:  Has not yet been discussed the attending provider and/or case management       SUBJECTIVE:   Patient stated I don't feel dizzy.     OBJECTIVE DATA SUMMARY:   HISTORY:   Past Medical History:   Diagnosis Date    CAD (coronary artery disease)     3 cardiac stents   heart Dr. Rosa Jesus Cervical spondylosis without myelopathy 2009    Chronic pain     shoulder left, upper lumbar    Depressive disorder, not elsewhere classified     Diaphragmatic hernia without mention of obstruction or gangrene     GERD (gastroesophageal reflux disease)     Hypercholesterolemia     Hypertension     Hypertrophy of prostate without urinary obstruction and other lower urinary tract symptoms (LUTS) 2008    Insomnia, unspecified 2009    Lumbosacral spondylosis without myelopathy 2009    Osteoarthrosis involving, or with mention of more than one site, but not specified as generalized, multiple sites 2010    Varicose veins 12/11/2014     Past Surgical History:   Procedure Laterality Date    CABG, ARTERY-VEIN, TWO  1/9/12    CABG    HX APPENDECTOMY  1962    HX CATARACT REMOVAL  2015    bilateral    HX COLONOSCOPY  2010    x2 small beign polyps    HX COLONOSCOPY  2017    hyperplastic polyp    HX ENDOSCOPY  2010    acid reflux    HX HEART CATHETERIZATION  1998, 2001    3 stents    HX HEENT  1962    fx nose repair    HX HERNIA REPAIR  early 1980's    left inguinal, with mesh    HX ORTHOPAEDIC  2017    lumbar fusion    HX OTHER SURGICAL  7 yrs.  old    Hiatal Hernia    HX TONSILLECTOMY      HX VASCULAR ACCESS      IR INSERT TUNL CVAD W PORT LESS THAN 5 YR  1/23/2019       Prior Level of Function/Environment/Context: modified independent for ADLs and mobility with RW  Expanded or extensive additional review of patient history:   Home Situation  Home Environment: Private residence  # Steps to Enter: 5  Rails to Enter: Yes  Hand Rails : Right  One/Two Story Residence: Two story, live on 1st floor  Living Alone: No  Support Systems: Child(brandon), Family member(s)  Patient Expects to be Discharged to[de-identified] Private residence  Current DME Used/Available at Home: Walker, rolling  Tub or Shower Type: Shower    EXAMINATION OF PERFORMANCE DEFICITS:  Cognitive/Behavioral Status:  Neurologic State: Alert  Orientation Level: Oriented X4  Cognition: Appropriate decision making; Appropriate for age attention/concentration             Skin: visible skin appears intact    Edema: none noted    Hearing: Auditory  Auditory Impairment: Hard of hearing, bilateral    Vision/Perceptual:                           Acuity: Within Defined Limits    Corrective Lenses: Glasses    Range of Motion:    AROM: Within functional limits                         Strength:    Strength: Within functional limits                Coordination:  Coordination: Within functional limits  Fine Motor Skills-Upper: Left Intact; Right Intact    Gross Motor Skills-Upper: Left Intact; Right Intact    Tone & Sensation:    Tone: Normal  Sensation: Intact                      Balance:  Sitting: Intact  Standing: Intact    Functional Mobility and Transfers for ADLs:  Bed Mobility:  Rolling: Independent  Supine to Sit: Independent  Sit to Supine: Independent  Scooting: Independent    Transfers:  Sit to Stand: Independent  Stand to Sit: Independent    ADL Assessment:  Feeding: Independent    Oral Facial Hygiene/Grooming: Modified Independent    Bathing: Modified independent    Upper Body Dressing: Modified independent    Lower Body Dressing: Modified independent    Toileting: Modified independent            Functional Measure:  Barthel Index:    Bathin  Bladder: 10  Bowels: 10  Groomin  Dressing: 10  Feeding: 10  Mobility: 15  Stairs: 10  Toilet Use: 10  Transfer (Bed to Chair and Back): 15  Total: 100/100        The Barthel ADL Index: Guidelines  1. The index should be used as a record of what a patient does, not as a record of what a patient could do. 2. The main aim is to establish degree of independence from any help, physical or verbal, however minor and for whatever reason.   3. The need for supervision renders the patient not independent. 4. A patient's performance should be established using the best available evidence. Asking the patient, friends/relatives and nurses are the usual sources, but direct observation and common sense are also important. However direct testing is not needed. 5. Usually the patient's performance over the preceding 24-48 hours is important, but occasionally longer periods will be relevant. 6. Middle categories imply that the patient supplies over 50 per cent of the effort. 7. Use of aids to be independent is allowed. Noel Benitez., Barthel, DJasonW. (3607). Functional evaluation: the Barthel Index. [address] [facility] (14)2. Rodney Porter syeda OLESYA Sesay, Janae Morris., Debi Conrad., [facility], [address] [facility] (1999). Measuring the change indisability after inpatient rehabilitation; comparison of the responsiveness of the Barthel Index and Functional Pierre Measure. Journal of Neurology, Neurosurgery, and Psychiatry, 66(4), 806-278. Lendon Soulier, N.J.A, LAWRENCE Hernandez, & Ed Peace M.A. (2004.) Assessment of post-stroke quality of life in cost-effectiveness studies: The usefulness of the Barthel Index and the EuroQoL-5D. Quality of Life Research, 15, 199-28         Occupational Therapy Evaluation Charge Determination   History Examination Decision-Making   LOW Complexity : Brief history review  LOW Complexity : 1-3 performance deficits relating to physical, cognitive , or psychosocial skils that result in activity limitations and / or participation restrictions  MEDIUM Complexity : Patient may present with comorbidities that affect occupational performnce.  Miniml to moderate modification of tasks or assistance (eg, physical or verbal ) with assesment(s) is necessary to enable patient to complete evaluation       Based on the above components, the patient evaluation is determined to be of the following complexity level: LOW   Pain Rating:  Patient did not report pain    Activity Tolerance:   Good tolerance although limited by asymptomatic orthostatic hypotension    After treatment patient left in no apparent distress:    Sitting in chair, Call bell within reach, Bed / chair alarm activated and Caregiver / family present    COMMUNICATION/EDUCATION:   The patients plan of care was discussed with: Physical therapist and Registered nurse.      Thank you for this referral.  Steffen Caldera OT  Time Calculation: 36 mins

## 2020-08-01 NOTE — PROGRESS NOTES
[facility] Cardiology Progress Note    Date of Service: 8/1/2020    Subjective:  pAF yesterday evening at 23:04 with HRs up to 140s. Patient was asymptomatic, denied CP, LH, palpitations. ECG confirmed AF with RVR. Given metoprolol 5 mg IV with conversion to NSR at 00:03. Had a 4.78 second pause on conversion to NSR. Had mild LH, but no pre-syncope with the pause. Denies prior diagnosis of AF.     Objective:    Visit Vitals  /72 (BP 1 Location: Right arm, BP Patient Position: At rest)   Pulse 88   Temp 98.3 °F (36.8 °C)   Resp 21   Ht 6' (1.829 m)   Wt 82.5 kg (181 lb 14.1 oz)   SpO2 93%   BMI 24.67 kg/m²       No intake or output data in the 24 hours ending 08/01/20 1118     Physical Exam  GEN: NAD, appears stated age  [de-identified]: EOMI, MMM, OP clear  NECK: Normal JVP  CV: RRR, normal S1 and S2, no M/R/G  LUNGS: CTAB, no W/R/R  ABD: NABS, soft, NT/ND  EXT: No edema, 2+ distal pulses  PSYCH: Mood and affect normal  NEURO: AAO, MAEW, face symmetrical, speech intact    Current Facility-Administered Medications   Medication Dose Route Frequency    aspirin delayed-release tablet 81 mg  81 mg Oral DAILY    DULoxetine (CYMBALTA) capsule 60 mg  60 mg Oral DAILY    finasteride (PROSCAR) tablet 5 mg  5 mg Oral DAILY    LORazepam (ATIVAN) tablet 0.5 mg  0.5 mg Oral Q4H PRN    nortriptyline (PAMELOR) capsule 50 mg  50 mg Oral QHS    ranolazine ER (RANEXA) tablet 1,000 mg  1,000 mg Oral Q12H    rivaroxaban (XARELTO) tablet 20 mg  20 mg Oral DAILY WITH BREAKFAST    rosuvastatin (CRESTOR) tablet 10 mg  10 mg Oral QHS    senna (SENOKOT) tablet 8.6 mg  1 Tab Oral DAILY    tamsulosin (FLOMAX) capsule 0.8 mg  0.8 mg Oral DAILY    midodrine (PROAMATINE) tablet 5 mg  5 mg Oral TID WITH MEALS    metoprolol succinate (TOPROL-XL) XL tablet 12.5 mg  12.5 mg Oral DAILY    metoprolol (LOPRESSOR) injection 5 mg  5 mg IntraVENous Q15MIN PRN    sodium chloride (NS) flush 5-40 mL  5-40 mL IntraVENous Q8H    sodium chloride (NS) flush 5-40 mL  5-40 mL IntraVENous PRN       Data Reviewed:  Recent Labs     07/31/20 2354 07/30/20  1341   * 130*   K 4.1 4.6    96*   CO2 21 26   * 97   BUN 14 15   CREA 0.83 0.90   CA 7.9* 8.6   MG 2.0 2.0   ALB  --  3.3*   ALT  --  29   INR  --  1.3*     Recent Labs     07/31/20 2354 07/30/20  1341   WBC 2.9* 3.8*   HGB 10.8* 12.1   HCT 32.0* 35.7*    330     No results found for: SDES  Lab Results   Component Value Date/Time    Culture result: NO GROWTH 6 DAYS 04/23/2019 05:58 PM    Culture result: NO GROWTH 6 DAYS 04/23/2019 05:25 PM    Culture result: NO GROWTH 4 DAYS 12/11/2018 01:23 PM       All Cardiac Markers in the last 24 hours:  No results found for: CPK, CK, CKMMB, CKMB, RCK3, CKMBT, CKMBPOC, CKNDX, CKND1, JUANCHO, TROPT, TROIQ, NADEGE, TROPT, TNIPOC, BNP, BNPP, BNPNT    Telemetry (personally reviewed): pAF yesterday evening at 23:04 with HRs up to 140s. Patient was asymptomatic, denied CP, LH, palpitations. ECG confirmed AF with RVR. Given metoprolol 5 mg IV with conversion to NSR at 00:03. Had a 4.78 second pause on conversion to NSR. Assessment:  1. Recurrent syncope  2. pAF with RVR with 4.78 second pause on conversion to NSR  3. Orthostatic hypotension  4. CAD s/p 2V-CABG (LIMA-LAD, SVG-OM1; SVG known to be occluded)  5. Chronic systolic CHF with EF 58% with AV sclerosis without stenosis  6. Pancreatic cancer on chemotherapy  7. Prior PE, on rivaroxaban  8. Hyponatremia  9.  Chronic macrocytic anemia    Plan:  - Continue midodrine  - Continue metoprolol succinate 12.5 mg daily  - Continue aspirin, rivaroxaban  - Continue other CV medications as ordered  - If BP allows, will try to initiate low-dose ARB for HF GDMT  - Telemetry if recurrent pauses > 3 seconds associated with symptoms, will consult EP to consider PPM implantation  - If no recurrent AF and no recurrent pauses, would discharge with 30 day loop recorder (MCOT) to evaluate for pAF and possible tachybrady syndrome or recurrent pauses  - Outpatient follow-up with Dr. Javed [place] on discharge    Thank you for the opportunity to participate in the care of Bessie Reese and please do not hesitate to contact us should you have any questions. Signed:  Corie Suarez.  Francisco Mcconnell, [address] [place] [address] [facility]  08/01/20

## 2020-08-01 NOTE — PROGRESS NOTES
Occupational Therapy     Occupational therapy evaluation completed. Full documentation to follow. Patient does not require additional OT services. Patient was positive for orthostatic hypotension (asymptomatic, see vitals chart below), and was receptive to compensatory strategies. Recommend d/c home when medically stable. Vitals:    8/01/20 08/01/20 1228 08/01/20 1236 08/01/20 1241 08/01/20 1246   BP: 120/75 (!) 89/65 107/76 (!) 88/71 131/84   BP 1 Location: Right arm Right arm Right arm Right arm Right arm   BP Patient Position: Supine Standing Standing  Comment: after seated rest break + BLE AROM/ marching in place Standing  Comment: after walking ~300 ft Post activity; Sitting   Pulse: 84 93  (!) 120 (!) 102     Sandee Rodriguez OTR/L

## 2020-08-01 NOTE — PROGRESS NOTES
Problem: Syncope  Goal: *Absence of injury  Outcome: Progressing Towards Goal  Goal: Decrease or eliminate episodes of syncope  Outcome: Progressing Towards Goal     Problem: Patient Education: Go to Patient Education Activity  Goal: Patient/Family Education  Outcome: Progressing Towards Goal     Problem: Falls - Risk of  Goal: *Absence of Falls  Description: Document Arabella Yañez Fall Risk and appropriate interventions in the flowsheet.   Outcome: Progressing Towards Goal  Note: Fall Risk Interventions:  Mobility Interventions: Communicate number of staff needed for ambulation/transfer, OT consult for ADLs, Patient to call before getting OOB, PT Consult for mobility concerns, PT Consult for assist device competence, Strengthening exercises (ROM-active/passive)         Medication Interventions: Evaluate medications/consider consulting pharmacy, Patient to call before getting OOB, Teach patient to arise slowly    Elimination Interventions: Call light in reach, Elevated toilet seat, Patient to call for help with toileting needs, Stay With Me (per policy)    History of Falls Interventions: Consult care management for discharge planning, Door open when patient unattended, Evaluate medications/consider consulting pharmacy, Investigate reason for fall         Problem: Patient Education: Go to Patient Education Activity  Goal: Patient/Family Education  Outcome: Progressing Towards Goal     Problem: Discharge Planning  Goal: *Discharge to safe environment  Outcome: Progressing Towards Goal

## 2020-08-01 NOTE — PROGRESS NOTES
Hospitalist Progress Note      Hospital summary: Pamela Freire is a [de-identified] y.o. male with past medical history significant for pancreatic cancer on chemotherapy, CAD, hypertension presented to the emergency room after having several episodes today of dizziness/diet lightheadedness while standing. Patient states that for the last several weeks he has episode of dizziness resulting in passing out. Episodes can happen during exertion and at rest. Denies chest pain, SOB, palpitations, nausea prior to syncope episode. He was seen by his cardiologist who started on Midodrine for orthostatic hypotension. He reports compliance with meds but still having symptoms. He has been experiencing increased LEVY for several weeks now. No cough or pain. 7/30/2020      Assessment/Plan:  Recurrent Syncope   -  cardiac origin given worsening LEVY vs orthostatics. - normal Troponin, ECG just showed non specific t wave changes. No arrhythmia noted. - CT of the head negative. - CTA chest: no PE.  - Echo: 30 - 35%. Abnormal left ventricular strain. There is a possible mass noted in the right atrium  - Orthostatic positive  - Carotid doppler: Consistent with less than 50% stenosis of the right and left internal carotid   - Cardiology on board   - c/w midodrine 5mg tid     Afib with RVR - new onset  - c/w metoprolol  - pt on Xarelto for PE    Systolic CHF with NYHA class III on poa  Hx CAD s/p CABG jn 2012  - Echo with EF 30-35%  - cardiology started on metoprolol 7/31  - c/w aspirin, Ranexa      Hypertension: BP stable. Continue home medication     Hx  PE: on xarelto  Hyponatremia: improved, will monitor      Pancreatitic cancer: on chemo. f/u with oncology outpt   BPH: continue with home meds finasteride, flomax     Code status: Full  DVT prophylaxis: Xarelto  Disposition: TBD. Home when ready.   ----------------------------------------------    CC: Syncope    S: Patient is seen and examined at bedside this morning. He feels ok. He had an episode of Afib last night, IV metoprolol given and he had a pause 5.79 - then concerted to NSR. Pt states he was asymptomatic during afib period but dizzy after metoprolol given. Discussed with cardiology Dr. [place] - if no more arrhythmias or pauses - ok to DC tomorrow with loop recorder. May need to consider PPM if recurrent pauses    Review of Systems:  A comprehensive review of systems was negative. O:  Visit Vitals  /84 (BP 1 Location: Right arm, BP Patient Position: Post activity; Sitting)   Pulse (!) 102   Temp 98.3 °F (36.8 °C)   Resp 21   Ht 6' (1.829 m)   Wt 82.5 kg (181 lb 14.1 oz)   SpO2 93%   BMI 24.67 kg/m²       PHYSICAL EXAM:  Gen: NAD  HEENT: anicteric sclerae, normal conjunctiva, oropharynx clear, MM moist  Neck: supple, trachea midline, no adenopathy  Heart: RRR, no MRG, no JVD, no peripheral edema  Lungs: CTA b/l, non-labored respirations  Abd: soft, NT, ND, BS+, no organomegaly  Extr: warm  Skin: dry, no rash  Neuro: CN II-XII grossly intact, normal speech, moves all extremities  Psych: normal mood, appropriate affect    No intake or output data in the 24 hours ending 08/01/20 1356     Recent labs & imaging reviewed:  Recent Results (from the past 24 hour(s))   SODIUM, UR, RANDOM    Collection Time: 07/31/20  7:07 PM   Result Value Ref Range    Sodium,urine random 120 MMOL/L   OSMOLALITY, UR    Collection Time: 07/31/20  7:07 PM   Result Value Ref Range    Osmolality,urine 551 MOSM/kg H2O   OSMOLALITY, SERUM/PLASMA    Collection Time: 07/31/20  7:11 PM   Result Value Ref Range    Osmolality, serum/plasma 286 mOsm/kg H2O   EKG, 12 LEAD, INITIAL    Collection Time: 07/31/20 11:25 PM   Result Value Ref Range    Ventricular Rate 145 BPM    Atrial Rate 145 BPM    P-R Interval 120 ms    QRS Duration 180 ms    Q-T Interval 322 ms    QTC Calculation (Bezet) 500 ms    Calculated R Axis -45 degrees    Calculated T Axis 102 degrees    Diagnosis       ** Poor data quality, interpretation may be adversely affected  Sinus tachycardia with occasional premature ventricular complexes and fusion   complexes  Left axis deviation  Nonspecific intraventricular block  When compared with ECG of 21-SEP-2016 14:04,  Significant changes have occurred     METABOLIC PANEL, BASIC    Collection Time: 07/31/20 11:54 PM   Result Value Ref Range    Sodium 134 (L) 136 - 145 mmol/L    Potassium 4.1 3.5 - 5.1 mmol/L    Chloride 105 97 - 108 mmol/L    CO2 21 21 - 32 mmol/L    Anion gap 8 5 - 15 mmol/L    Glucose 134 (H) 65 - 100 mg/dL    BUN 14 6 - 20 MG/DL    Creatinine 0.83 0.70 - 1.30 MG/DL    BUN/Creatinine ratio 17 12 - 20      GFR est AA >60 >60 ml/min/1.73m2    GFR est non-AA >60 >60 ml/min/1.73m2    Calcium 7.9 (L) 8.5 - 10.1 MG/DL   MAGNESIUM    Collection Time: 07/31/20 11:54 PM   Result Value Ref Range    Magnesium 2.0 1.6 - 2.4 mg/dL   CBC W/O DIFF    Collection Time: 07/31/20 11:54 PM   Result Value Ref Range    WBC 2.9 (L) 4.1 - 11.1 K/uL    RBC 3.13 (L) 4.10 - 5.70 M/uL    HGB 10.8 (L) 12.1 - 17.0 g/dL    HCT 32.0 (L) 36.6 - 50.3 %    .2 (H) 80.0 - 99.0 FL    MCH 34.5 (H) 26.0 - 34.0 PG    MCHC 33.8 30.0 - 36.5 g/dL    RDW 13.5 11.5 - 14.5 %    PLATELET 597 452 - 304 K/uL    MPV 9.0 8.9 - 12.9 FL    NRBC 0.0 0  WBC    ABSOLUTE NRBC 0.00 0.00 - 0.01 K/uL     Recent Labs     07/31/20 2354 07/30/20  1341   WBC 2.9* 3.8*   HGB 10.8* 12.1   HCT 32.0* 35.7*    330     Recent Labs     07/31/20 2354 07/30/20  1341   * 130*   K 4.1 4.6    96*   CO2 21 26   BUN 14 15   CREA 0.83 0.90   * 97   CA 7.9* 8.6   MG 2.0 2.0     Recent Labs     07/30/20  1341   ALT 29   AP 70   TBILI 0.5   TP 6.5   ALB 3.3*   GLOB 3.2     Recent Labs     07/30/20  1341   INR 1.3*   PTP 12.7*      No results for input(s): FE, TIBC, PSAT, FERR in the last 72 hours. No results found for: FOL, RBCF   No results for input(s): PH, PCO2, PO2 in the last 72 hours.   Recent Labs 07/30/20  1341   TROIQ <0.05     Lab Results   Component Value Date/Time    Cholesterol, total 131 06/12/2020 08:34 AM    HDL Cholesterol 48 06/12/2020 08:34 AM    LDL, calculated 58 06/12/2020 08:34 AM    Triglyceride 126 06/12/2020 08:34 AM     Lab Results   Component Value Date/Time    Glucose (POC) 112 (H) 01/12/2012 06:32 AM    Glucose (POC) 138 (H) 01/11/2012 09:43 PM    Glucose (POC) 148 (H) 01/11/2012 05:55 PM    Glucose (POC) 181 (H) 01/11/2012 03:35 PM    Glucose (POC) 110 01/11/2012 12:29 PM     Lab Results   Component Value Date/Time    Color YELLOW/STRAW 09/21/2016 01:48 PM    Appearance CLEAR 09/21/2016 01:48 PM    Specific gravity 1.010 09/21/2016 01:48 PM    pH (UA) 7.5 09/21/2016 01:48 PM    Protein NEGATIVE  09/21/2016 01:48 PM    Glucose NEGATIVE  09/21/2016 01:48 PM    Ketone NEGATIVE  09/21/2016 01:48 PM    Bilirubin NEGATIVE  09/21/2016 01:48 PM    Urobilinogen 0.2 09/21/2016 01:48 PM    Nitrites NEGATIVE  09/21/2016 01:48 PM    Leukocyte Esterase NEGATIVE  09/21/2016 01:48 PM    Epithelial cells FEW 09/21/2016 01:48 PM    Bacteria NEGATIVE  09/21/2016 01:48 PM    WBC 0-4 09/21/2016 01:48 PM    RBC 0-5 09/21/2016 01:48 PM       Med list reviewed  Current Facility-Administered Medications   Medication Dose Route Frequency    aspirin delayed-release tablet 81 mg  81 mg Oral DAILY    DULoxetine (CYMBALTA) capsule 60 mg  60 mg Oral DAILY    finasteride (PROSCAR) tablet 5 mg  5 mg Oral DAILY    LORazepam (ATIVAN) tablet 0.5 mg  0.5 mg Oral Q4H PRN    nortriptyline (PAMELOR) capsule 50 mg  50 mg Oral QHS    ranolazine ER (RANEXA) tablet 1,000 mg  1,000 mg Oral Q12H    rivaroxaban (XARELTO) tablet 20 mg  20 mg Oral DAILY WITH BREAKFAST    rosuvastatin (CRESTOR) tablet 10 mg  10 mg Oral QHS    senna (SENOKOT) tablet 8.6 mg  1 Tab Oral DAILY    tamsulosin (FLOMAX) capsule 0.8 mg  0.8 mg Oral DAILY    midodrine (PROAMATINE) tablet 5 mg  5 mg Oral TID WITH MEALS    metoprolol succinate (TOPROL-XL) XL tablet 12.5 mg  12.5 mg Oral DAILY    metoprolol (LOPRESSOR) injection 5 mg  5 mg IntraVENous Q15MIN PRN    sodium chloride (NS) flush 5-40 mL  5-40 mL IntraVENous Q8H    sodium chloride (NS) flush 5-40 mL  5-40 mL IntraVENous PRN       Care Plan discussed with:  Patient/Family, Nurse and     Jan Bain MD  Internal Medicine  Date of Service: 8/1/2020

## 2020-08-02 VITALS
SYSTOLIC BLOOD PRESSURE: 95 MMHG | BODY MASS INDEX: 23.78 KG/M2 | RESPIRATION RATE: 19 BRPM | HEART RATE: 81 BPM | OXYGEN SATURATION: 95 % | HEIGHT: 72 IN | WEIGHT: 175.6 LBS | TEMPERATURE: 97.9 F | DIASTOLIC BLOOD PRESSURE: 58 MMHG

## 2020-08-02 LAB
COMMENT, HOLDF: NORMAL
FOLATE SERPL-MCNC: 48.9 NG/ML (ref 5–21)
SAMPLES BEING HELD,HOLD: NORMAL
VIT B12 SERPL-MCNC: 718 PG/ML (ref 193–986)

## 2020-08-02 PROCEDURE — 74011000250 HC RX REV CODE- 250: Performed by: NURSE PRACTITIONER

## 2020-08-02 PROCEDURE — 36415 COLL VENOUS BLD VENIPUNCTURE: CPT

## 2020-08-02 PROCEDURE — 82746 ASSAY OF FOLIC ACID SERUM: CPT

## 2020-08-02 PROCEDURE — 74011250637 HC RX REV CODE- 250/637: Performed by: INTERNAL MEDICINE

## 2020-08-02 PROCEDURE — 82607 VITAMIN B-12: CPT

## 2020-08-02 RX ORDER — METOPROLOL SUCCINATE 25 MG/1
12.5 TABLET, EXTENDED RELEASE ORAL DAILY
Qty: 30 TAB | Refills: 0 | Status: SHIPPED | OUTPATIENT
Start: 2020-08-03

## 2020-08-02 RX ORDER — MIDODRINE HYDROCHLORIDE 5 MG/1
5 TABLET ORAL
Qty: 90 TAB | Refills: 0 | Status: SHIPPED | OUTPATIENT
Start: 2020-08-02

## 2020-08-02 RX ADMIN — ASPIRIN 81 MG: 81 TABLET, COATED ORAL at 08:47

## 2020-08-02 RX ADMIN — MIDODRINE HYDROCHLORIDE 5 MG: 5 TABLET ORAL at 12:17

## 2020-08-02 RX ADMIN — METOPROLOL TARTRATE 5 MG: 5 INJECTION INTRAVENOUS at 00:23

## 2020-08-02 RX ADMIN — Medication 10 ML: at 05:54

## 2020-08-02 RX ADMIN — FINASTERIDE 5 MG: 5 TABLET, FILM COATED ORAL at 08:47

## 2020-08-02 RX ADMIN — RANOLAZINE 1000 MG: 500 TABLET, FILM COATED, EXTENDED RELEASE ORAL at 12:17

## 2020-08-02 RX ADMIN — MIDODRINE HYDROCHLORIDE 5 MG: 5 TABLET ORAL at 08:47

## 2020-08-02 RX ADMIN — TAMSULOSIN HYDROCHLORIDE 0.8 MG: 0.4 CAPSULE ORAL at 08:47

## 2020-08-02 RX ADMIN — METOPROLOL TARTRATE 5 MG: 5 INJECTION INTRAVENOUS at 08:47

## 2020-08-02 RX ADMIN — RIVAROXABAN 20 MG: 20 TABLET, FILM COATED ORAL at 08:47

## 2020-08-02 RX ADMIN — DULOXETINE HYDROCHLORIDE 60 MG: 60 CAPSULE, DELAYED RELEASE ORAL at 08:47

## 2020-08-02 NOTE — DISCHARGE INSTRUCTIONS
Atrial Fibrillation: Care Instructions  Your Care Instructions     Atrial fibrillation is an irregular and often fast heartbeat. Treating this condition is important for several reasons. It can cause blood clots, which can travel from your heart to your brain and cause a stroke. If you have a fast heartbeat, you may feel lightheaded, dizzy, and weak. An irregular heartbeat can also increase your risk for heart failure. Atrial fibrillation is often the result of another heart condition, such as high blood pressure or coronary artery disease. Making changes to improve your heart condition will help you stay healthy and active. Follow-up care is a key part of your treatment and safety. Be sure to make and go to all appointments, and call your doctor if you are having problems. It's also a good idea to know your test results and keep a list of the medicines you take. How can you care for yourself at home? Medicines  · Take your medicines exactly as prescribed. Call your doctor if you think you are having a problem with your medicine. You will get more details on the specific medicines your doctor prescribes. · If your doctor has given you a blood thinner to prevent a stroke, be sure you get instructions about how to take your medicine safely. Blood thinners can cause serious bleeding problems. · Do not take any vitamins, over-the-counter drugs, or herbal products without talking to your doctor first.  Lifestyle changes  · Do not smoke. Smoking can increase your chance of a stroke and heart attack. If you need help quitting, talk to your doctor about stop-smoking programs and medicines. These can increase your chances of quitting for good. · Eat a heart-healthy diet. · Stay at a healthy weight. Lose weight if you need to. · Limit alcohol to 2 drinks a day for men and 1 drink a day for women. Too much alcohol can cause health problems. · Avoid colds and flu. Get a pneumococcal vaccine shot.  If you have had one before, ask your doctor whether you need another dose. Get a flu shot every year. If you must be around people with colds or flu, wash your hands often. Activity  · If your doctor recommends it, get more exercise. Walking is a good choice. Bit by bit, increase the amount you walk every day. Try for at least 30 minutes on most days of the week. You also may want to swim, bike, or do other activities. Your doctor may suggest that you join a cardiac rehabilitation program so that you can have help increasing your physical activity safely. · Start light exercise if your doctor says it is okay. Even a small amount will help you get stronger, have more energy, and manage stress. Walking is an easy way to get exercise. Start out by walking a little more than you did in the hospital. Gradually increase the amount you walk. · When you exercise, watch for signs that your heart is working too hard. You are pushing too hard if you cannot talk while you are exercising. If you become short of breath or dizzy or have chest pain, sit down and rest immediately. · Check your pulse regularly. Place two fingers on the artery at the palm side of your wrist, in line with your thumb. If your heartbeat seems uneven or fast, talk to your doctor. When should you call for help? [id number] anytime you think you may need emergency care. For example, call if:  · You have symptoms of a heart attack. These may include:  ? Chest pain or pressure, or a strange feeling in the chest.  ? Sweating. ? Shortness of breath. ? Nausea or vomiting. ? Pain, pressure, or a strange feeling in the back, neck, jaw, or upper belly or in one or both shoulders or arms. ? Lightheadedness or sudden weakness. ? A fast or irregular heartbeat. After you call 911, the  may tell you to chew 1 adult-strength or 2 to 4 low-dose aspirin. Wait for an ambulance. Do not try to drive yourself. · You have symptoms of a stroke.  These may include:  ? Sudden numbness, tingling, weakness, or loss of movement in your face, arm, or leg, especially on only one side of your body. ? Sudden vision changes. ? Sudden trouble speaking. ? Sudden confusion or trouble understanding simple statements. ? Sudden problems with walking or balance. ? A sudden, severe headache that is different from past headaches. · You passed out (lost consciousness). Call your doctor now or seek immediate medical care if:  · You have new or increased shortness of breath. · You feel dizzy or lightheaded, or you feel like you may faint. · Your heart rate becomes irregular. · You can feel your heart flutter in your chest or skip heartbeats. Tell your doctor if these symptoms are new or worse. Watch closely for changes in your health, and be sure to contact your doctor if you have any problems. Where can you learn more? Go to http://www.gray.com/  Enter U020 in the search box to learn more about \"Atrial Fibrillation: Care Instructions. \"  Current as of: December 16, 2019               Content Version: 12.5  © 2641-0050 [employer] Care instructions adapted under license by [employer] (which disclaims liability or warranty for this information). If you have questions about a medical condition or this instruction, always ask your healthcare professional. [address] any warranty or liability for your use of this information.        Please bring this form with you to show your primary care provider at your follow-up appointment.     Primary care provider:  Dr. Kwesi Lomeli MD    Discharging provider:  Sarah Trejo MD    You have been admitted to the hospital with the following diagnoses:  · Syncope [R55]  · Syncope [R55]    FOLLOW-UP CARE RECOMMENDATIONS:    Recommend NO DRIVING until cleared by cardiology     APPOINTMENTS:  · Follow-up with primary care provider, Dr. Kwesi Lomeli MD  -  Please call [phone] shortly after discharge to set up an appointment to be seen in  1 week   · Cardiology in1 week    FOLLOW-UP TESTS recommended: none    PENDING TEST RESULTS:  At the time of your discharge the following test results are still pending: none  Please make sure you review these results with your outpatient follow-up provider(s). SYMPTOMS to watch for: chest pain, shortness of breath, fever, chills, nausea, vomiting, diarrhea, change in mentation, falling, weakness, bleeding. DIET/what to eat:  Cardiac Diet    ACTIVITY:  Activity as tolerated    What to do if new or unexpected symptoms occur? If you experience any of the above symptoms (or should other concerns or questions arise after discharge) please call your primary care physician. Return to the emergency room if you cannot get hold of your doctor. · It is very important that you keep your follow-up appointment(s). · Please bring discharge papers, medication list (and/or medication bottles) to your follow-up appointments for review by your outpatient provider(s). · Please check the list of medications and be sure it includes every medication (even non-prescription medications) that your provider wants you to take. · It is important that you take the medication exactly as they are prescribed. · Keep your medication in the bottles provided by the pharmacist and keep a list of the medication names, dosages, and times to be taken in your wallet. · Do not take other medications without consulting your doctor. · If you have any questions about your medications or other instructions, please talk to your nurse or care provider before you leave the hospital.    I understand that if any problems occur once I am at home I am to contact my physician. These instructions were explained to me and I had the opportunity to ask questions.

## 2020-08-02 NOTE — PROGRESS NOTES
At 8/2 0008 patient rhythm changed fr NSR to Afib. Patient HR fluctuation  for approx 10 minutes, then sustaining >120 bpm.    Patient given IV metoprolol 5mg per orders.  contine to monitor for HR < 110 as goal.

## 2020-08-02 NOTE — PROGRESS NOTES
Hospitalist Progress Note      Hospital summary: Zachariah Hernandes is a [de-identified] y.o. male with past medical history significant for pancreatic cancer on chemotherapy, CAD, hypertension presented to the emergency room after having several episodes today of dizziness/diet lightheadedness while standing. Patient states that for the last several weeks he has episode of dizziness resulting in passing out. Episodes can happen during exertion and at rest. Denies chest pain, SOB, palpitations, nausea prior to syncope episode. He was seen by his cardiologist who started on Midodrine for orthostatic hypotension. He reports compliance with meds but still having symptoms. He has been experiencing increased LEVY for several weeks now. No cough or pain. 7/30/2020      Assessment/Plan:  Recurrent Syncope   -  cardiac origin given worsening LEVY vs orthostatics. - normal Troponin, ECG just showed non specific t wave changes. No arrhythmia noted. - CT of the head negative. - CTA chest: no PE.  - Echo: 30 - 35%. Abnormal left ventricular strain. There is a possible mass noted in the right atrium  - Orthostatic positive  - Carotid doppler: Consistent with less than 50% stenosis of the right and left internal carotid   - Cardiology on board   - c/w midodrine 5mg tid     Afib with RVR - new onset  - c/w metoprolol  - pt on Xarelto for PE    Systolic CHF with NYHA class III on poa  Hx CAD s/p CABG jn 2012  - Echo with EF 30-35%  - cardiology started on metoprolol 7/31  - c/w aspirin, Ranexa      Hypertension: BP stable. Continue home medication     Hx  PE: on xarelto  Hyponatremia: improved, will monitor      Pancreatitic cancer: on chemo. f/u with oncology outpt   BPH: continue with home meds finasteride, flomax     Code status: Full  DVT prophylaxis: Xarelto  Disposition: TBD. Home with HHPT when ready.   ----------------------------------------------    CC: Syncope    S: Patient is seen and examined at bedside. He feels ok. He had another episode of Afib last night and converted to NSR , he was in Afib during my encounter this morning in HR in 150's for 1 min and then converted to NSR. Pt was asymptomatic. Will wait for cardiology recs    Review of Systems:  A comprehensive review of systems was negative. O:  Visit Vitals  BP 95/58 (BP 1 Location: Left arm)   Pulse 81   Temp 97.9 °F (36.6 °C)   Resp 19   Ht 6' (1.829 m)   Wt 79.7 kg (175 lb 9.6 oz)   SpO2 95%   BMI 23.82 kg/m²       PHYSICAL EXAM:  Gen: NAD  HEENT: anicteric sclerae, normal conjunctiva, oropharynx clear, MM moist  Neck: supple, trachea midline, no adenopathy  Heart: RRR, no MRG, no JVD, no peripheral edema  Lungs: CTA b/l, non-labored respirations  Abd: soft, NT, ND, BS+, no organomegaly  Extr: warm  Skin: dry, no rash  Neuro: CN II-XII grossly intact, normal speech, moves all extremities  Psych: normal mood, appropriate affect    No intake or output data in the 24 hours ending 08/02/20 1106     Recent labs & imaging reviewed:  Recent Results (from the past 24 hour(s))   FOLATE    Collection Time: 08/02/20 12:39 AM   Result Value Ref Range    Folate 48.9 (H) 5.0 - 21.0 ng/mL   VITAMIN B12    Collection Time: 08/02/20 12:39 AM   Result Value Ref Range    Vitamin B12 718 193 - 986 pg/mL   SAMPLES BEING HELD    Collection Time: 08/02/20 12:39 AM   Result Value Ref Range    SAMPLES BEING HELD 1LAV     COMMENT        Add-on orders for these samples will be processed based on acceptable specimen integrity and analyte stability, which may vary by analyte.      Recent Labs     07/31/20 2354 07/30/20  1341   WBC 2.9* 3.8*   HGB 10.8* 12.1   HCT 32.0* 35.7*    330     Recent Labs     07/31/20  2354 07/30/20  1341   * 130*   K 4.1 4.6    96*   CO2 21 26   BUN 14 15   CREA 0.83 0.90   * 97   CA 7.9* 8.6   MG 2.0 2.0     Recent Labs     07/30/20  1341   ALT 29   AP 70   TBILI 0.5   TP 6.5   ALB 3.3*   GLOB 3.2     Recent Labs 07/30/20  1341   INR 1.3*   PTP 12.7*      No results for input(s): FE, TIBC, PSAT, FERR in the last 72 hours. Lab Results   Component Value Date/Time    Folate 48.9 (H) 08/02/2020 12:39 AM      No results for input(s): PH, PCO2, PO2 in the last 72 hours.   Recent Labs     07/30/20  1341   TROIQ <0.05     Lab Results   Component Value Date/Time    Cholesterol, total 131 06/12/2020 08:34 AM    HDL Cholesterol 48 06/12/2020 08:34 AM    LDL, calculated 58 06/12/2020 08:34 AM    Triglyceride 126 06/12/2020 08:34 AM     Lab Results   Component Value Date/Time    Glucose (POC) 112 (H) 01/12/2012 06:32 AM    Glucose (POC) 138 (H) 01/11/2012 09:43 PM    Glucose (POC) 148 (H) 01/11/2012 05:55 PM    Glucose (POC) 181 (H) 01/11/2012 03:35 PM    Glucose (POC) 110 01/11/2012 12:29 PM     Lab Results   Component Value Date/Time    Color YELLOW/STRAW 09/21/2016 01:48 PM    Appearance CLEAR 09/21/2016 01:48 PM    Specific gravity 1.010 09/21/2016 01:48 PM    pH (UA) 7.5 09/21/2016 01:48 PM    Protein NEGATIVE  09/21/2016 01:48 PM    Glucose NEGATIVE  09/21/2016 01:48 PM    Ketone NEGATIVE  09/21/2016 01:48 PM    Bilirubin NEGATIVE  09/21/2016 01:48 PM    Urobilinogen 0.2 09/21/2016 01:48 PM    Nitrites NEGATIVE  09/21/2016 01:48 PM    Leukocyte Esterase NEGATIVE  09/21/2016 01:48 PM    Epithelial cells FEW 09/21/2016 01:48 PM    Bacteria NEGATIVE  09/21/2016 01:48 PM    WBC 0-4 09/21/2016 01:48 PM    RBC 0-5 09/21/2016 01:48 PM       Med list reviewed  Current Facility-Administered Medications   Medication Dose Route Frequency    aspirin delayed-release tablet 81 mg  81 mg Oral DAILY    DULoxetine (CYMBALTA) capsule 60 mg  60 mg Oral DAILY    finasteride (PROSCAR) tablet 5 mg  5 mg Oral DAILY    LORazepam (ATIVAN) tablet 0.5 mg  0.5 mg Oral Q4H PRN    nortriptyline (PAMELOR) capsule 50 mg  50 mg Oral QHS    ranolazine ER (RANEXA) tablet 1,000 mg  1,000 mg Oral Q12H    rivaroxaban (XARELTO) tablet 20 mg  20 mg Oral DAILY WITH BREAKFAST    rosuvastatin (CRESTOR) tablet 10 mg  10 mg Oral QHS    senna (SENOKOT) tablet 8.6 mg  1 Tab Oral DAILY    tamsulosin (FLOMAX) capsule 0.8 mg  0.8 mg Oral DAILY    midodrine (PROAMATINE) tablet 5 mg  5 mg Oral TID WITH MEALS    metoprolol succinate (TOPROL-XL) XL tablet 12.5 mg  12.5 mg Oral DAILY    sodium chloride (NS) flush 5-40 mL  5-40 mL IntraVENous Q8H    sodium chloride (NS) flush 5-40 mL  5-40 mL IntraVENous PRN       Care Plan discussed with:  Patient/Family, Nurse and     Wei Chanel MD  Internal Medicine  Date of Service: 8/2/2020

## 2020-08-02 NOTE — PROGRESS NOTES
Bedside shift change report given to [address]ing (oncoming nurse) by Haydee Humphrey RN (offgoing nurse). Report included the following information SBAR, Kardex, ED Summary, Procedure Summary, Intake/Output, Cardiac Rhythm NSR, Quality Measures and Dual Neuro Assessment.

## 2020-08-02 NOTE — PROGRESS NOTES
Problem: Syncope  Goal: *Absence of injury  Outcome: Progressing Towards Goal  Goal: Decrease or eliminate episodes of syncope  Outcome: Progressing Towards Goal     Problem: Patient Education: Go to Patient Education Activity  Goal: Patient/Family Education  Outcome: Progressing Towards Goal     Problem: Falls - Risk of  Goal: *Absence of Falls  Description: Document Vadim Camacho Fall Risk and appropriate interventions in the flowsheet.   Outcome: Progressing Towards Goal  Note: Fall Risk Interventions:  Mobility Interventions: Patient to call before getting OOB         Medication Interventions: Evaluate medications/consider consulting pharmacy, Patient to call before getting OOB    Elimination Interventions: Call light in reach, Elevated toilet seat, Toilet paper/wipes in reach    History of Falls Interventions: Door open when patient unattended         Problem: Patient Education: Go to Patient Education Activity  Goal: Patient/Family Education  Outcome: Progressing Towards Goal     Problem: Discharge Planning  Goal: *Discharge to safe environment  Outcome: Progressing Towards Goal     Problem: Arrhythmia Pathway (Adult)  Goal: *Absence of arrhythmia  Outcome: Progressing Towards Goal     Problem: Patient Education: Go to Patient Education Activity  Goal: Patient/Family Education  Outcome: Progressing Towards Goal

## 2020-08-02 NOTE — PROGRESS NOTES
Transition of Care Plan    Updated to inpatient 7/31/20. Patient being discharged home today. He lives at home with his wife, Ko Sellers and was self care and independent prior to admission. Patient given first IM letter and signed copy placed in chart    Wife transporting home.

## 2020-08-02 NOTE — PROGRESS NOTES
[facility] Cardiology Progress Note    Date of Service: 8/2/2020    Subjective:  Recurrent pAF overnight. No recurrent pauses >3 seconds. Had recurrent AF this morning with HR in 140-150s, now in NSR. Patient was asymptomatic during AF. Was given metoprolol 5 mg IV last night and again this morning. No complaints.     Objective:    Visit Vitals  BP 95/58 (BP 1 Location: Left arm)   Pulse 81   Temp 97.9 °F (36.6 °C)   Resp 19   Ht 6' (1.829 m)   Wt 79.7 kg (175 lb 9.6 oz)   SpO2 95%   BMI 23.82 kg/m²       No intake or output data in the 24 hours ending 08/02/20 1144     Physical Exam  GEN: NAD, appears stated age  [de-identified]: EOMI, MMM, OP clear  NECK: Normal JVP  CV: RRR, normal S1 and S2, no M/R/G  LUNGS: CTAB, no W/R/R  ABD: NABS, soft, NT/ND  EXT: No edema, 2+ distal pulses  PSYCH: Mood and affect normal  NEURO: AAO, MAEW, face symmetrical, speech intact    Current Facility-Administered Medications   Medication Dose Route Frequency    aspirin delayed-release tablet 81 mg  81 mg Oral DAILY    DULoxetine (CYMBALTA) capsule 60 mg  60 mg Oral DAILY    finasteride (PROSCAR) tablet 5 mg  5 mg Oral DAILY    LORazepam (ATIVAN) tablet 0.5 mg  0.5 mg Oral Q4H PRN    nortriptyline (PAMELOR) capsule 50 mg  50 mg Oral QHS    ranolazine ER (RANEXA) tablet 1,000 mg  1,000 mg Oral Q12H    rivaroxaban (XARELTO) tablet 20 mg  20 mg Oral DAILY WITH BREAKFAST    rosuvastatin (CRESTOR) tablet 10 mg  10 mg Oral QHS    senna (SENOKOT) tablet 8.6 mg  1 Tab Oral DAILY    tamsulosin (FLOMAX) capsule 0.8 mg  0.8 mg Oral DAILY    midodrine (PROAMATINE) tablet 5 mg  5 mg Oral TID WITH MEALS    metoprolol succinate (TOPROL-XL) XL tablet 12.5 mg  12.5 mg Oral DAILY    sodium chloride (NS) flush 5-40 mL  5-40 mL IntraVENous Q8H    sodium chloride (NS) flush 5-40 mL  5-40 mL IntraVENous PRN       Data Reviewed:  Recent Labs     07/31/20  2354 07/30/20  1341   * 130*   K 4.1 4.6    96*   CO2 21 26   * 97   BUN 14 15 CREA 0.83 0.90   CA 7.9* 8.6   MG 2.0 2.0   ALB  --  3.3*   ALT  --  29   INR  --  1.3*     Recent Labs     07/31/20  2354 07/30/20  1341   WBC 2.9* 3.8*   HGB 10.8* 12.1   HCT 32.0* 35.7*    330     No results found for: SDES  Lab Results   Component Value Date/Time    Culture result: NO GROWTH 6 DAYS 04/23/2019 05:58 PM    Culture result: NO GROWTH 6 DAYS 04/23/2019 05:25 PM    Culture result: NO GROWTH 4 DAYS 12/11/2018 01:23 PM       All Cardiac Markers in the last 24 hours:  No results found for: CPK, CK, CKMMB, CKMB, RCK3, CKMBT, CKMBPOC, CKNDX, CKND1, JUANCHO, TROPT, TROIQ, NADEGE, TROPT, TNIPOC, BNP, BNPP, BNPNT    Telemetry (personally reviewed): pAF 8/1/20 evening at 23:04 with HRs up to 140s. Patient was asymptomatic, denied CP, LH, palpitations. ECG confirmed AF with RVR. Given metoprolol 5 mg IV with conversion to NSR at 00:03. Had a 4.78 second pause on conversion to NSR. Since then, has had pAF with short post-conversion pauses, but none >3 seconds    Assessment:  1. Recurrent syncope  2. pAF with RVR with 4.78 second pause on conversion to NSR  3. Orthostatic hypotension  4. CAD s/p 2V-CABG (LIMA-LAD, SVG-OM1; SVG known to be occluded)  5. Chronic systolic CHF with EF 73% with AV sclerosis without stenosis  6. Pancreatic cancer on chemotherapy  7. Prior PE, on rivaroxaban  8. Hyponatremia  9.  Chronic macrocytic anemia    Plan:  - Continue midodrine  - Continue metoprolol succinate 12.5 mg daily  - Continue aspirin, rivaroxaban  - Continue other CV medications as ordered  - BP is too low to initiate ACEI/ARB for LV systolic dysfunction  - Telemetry   - Will consult EP (Dr. Lorrie Leija) to consider AF ablation and assess need for PPM; if SND at EP study, then will likely need PPM; otherwise probably will not require PPM if AF is eradicated (will also eliminate post-conversion pauses)  - I have discussed the case with Dr. Lorrie Leija; will arrange for AF ablation and sinus node EP study a week from tomorrow  - Recent MPI 3/20/20 showed inferior infarct with no residual ischemia and coronary CTA from 4/29/20 showed the SVG to OM1 is occluded; based on these studies there does not seem to be viable myocardium to be rescued; drop in EF may be related to non-ischemic cause; need to determine if chemotherapy is cardiotoxic; if so, may need to change   - States that he has been on 3 chemotherapeutic agents recently and others in the past; I stressed that he needs to tell his oncologist that his heart function has worsened and he needs to ask if any of the current chemotherapy is cardiotoxic; if so, he needs to consider changing this  - Outpatient follow-up with Dr. Marion Herrera on discharge; I will send her a message regarding concerns over chemotherapy-induced cardiomyopathy so that she can coordinate with the patient's oncologist    I discussed these recommendations with the patient and his daughter at bedside. We will sign-off, but are available if additional questions arise. Thank you for the opportunity to participate in the care of Carter Galo and please do not hesitate to contact us should you have any questions. Signed:  Janae Johnson, [address] [place] [address] / [address] [place] [address] [facility]  08/02/20

## 2020-08-02 NOTE — DISCHARGE SUMMARY
Discharge Summary     Patient:  Jonny Saint       MRN: [id number]       YOB: 1940       Age: [de-identified] y.o. Date of admission:  7/30/2020    Date of discharge:  8/2/2020    Primary care provider: Dr. Sergey Carlson MD    Admitting provider:  Charli Murillo MD    Discharging provider:  Maria [address] [phone]. If unavailable, call [phone] and ask the  to page the triage hospitalist.    Consultations  · IP CONSULT TO CARDIOLOGY    Procedures  · * No surgery found *    Discharge destination: home. The patient is stable for discharge. Admission diagnosis  · Syncope [R55]  · Syncope [R55]    Current Discharge Medication List      START taking these medications    Details   metoprolol succinate (TOPROL-XL) 25 mg XL tablet Take 0.5 Tabs by mouth daily. Qty: 30 Tab, Refills: 0         CONTINUE these medications which have CHANGED    Details   midodrine (PROAMATINE) 5 mg tablet Take 1 Tab by mouth three (3) times daily (with meals). Qty: 90 Tab, Refills: 0         CONTINUE these medications which have NOT CHANGED    Details   senna (Senna) 8.6 mg tablet Take 1 Tab by mouth daily. finasteride (PROSCAR) 5 mg tablet Take 5 mg by mouth daily. cyanocobalamin (VITAMIN B12) 500 mcg tablet Take 500 mcg by mouth daily. docusate sodium (DOK) 250 mg capsule Take 750 mg by mouth two (2) times a day. ranolazine ER (RANEXA) 500 mg SR tablet Take 1,000 mg by mouth two (2) times a day. DULoxetine (CYMBALTA) 60 mg capsule TAKE 1 CAPSULE BY MOUTH DAILY. INDICATIONS: CHRONIC MUSCLE OR BONE PAIN  Qty: 90 Cap, Refills: 1      nortriptyline (PAMELOR) 25 mg capsule Take 2 Caps by mouth nightly.   Qty: 30 Cap, Refills: 3    Associated Diagnoses: Neuropathy      tamsulosin (FLOMAX) 0.4 mg capsule TAKE 2 CAPSULES BY MOUTH NIGHLTY  Qty: 180 Cap, Refills: 1      ondansetron (ZOFRAN ODT) 4 mg disintegrating tablet Take 1 Tab by mouth every eight (8) hours as needed for Nausea. Indications: Nausea and Vomiting caused by Cancer Drugs  Qty: 30 Tab, Refills: 2    Associated Diagnoses: Chemotherapy-induced nausea      dexAMETHasone (DECADRON) 4 mg tablet Take 8 mg by mouth daily. Indications: Prevent Nausea and Vomiting from Cancer Chemotherapy  Qty: 180 Tab, Refills: 3    Comments: Take two tabs for two days after chemotherapy      rosuvastatin (CRESTOR) 10 mg tablet Take 1 Tab by mouth nightly. Indications: excessive fat in the blood  Qty: 90 Tab, Refills: 3    Associated Diagnoses: Malignant neoplasm of head of pancreas (HCC)      rivaroxaban (XARELTO) 20 mg tab tablet Take 1 Tab by mouth daily (with breakfast). Indications: a clot in the lung  Qty: 90 Tab, Refills: 3    Associated Diagnoses: Malignant neoplasm of head of pancreas ([address] Other pulmonary embolism without acute cor pulmonale, unspecified chronicity (HCC)      omeprazole (PRILOSEC) 20 mg capsule Take 20 mg by mouth daily. polyethylene glycol (MIRALAX) 17 gram/dose powder Take 17 g by mouth daily as needed. multivitamin (ONE A DAY) tablet Take 1 Tab by mouth daily. aspirin 81 mg tablet Take 81 mg by mouth. cyclobenzaprine (FLEXERIL) 10 mg tablet Take 10 mg by mouth three (3) times daily as needed for Muscle Spasm(s). ibuprofen (MOTRIN) 200 mg tablet Take 400 mg by mouth daily as needed for Pain. acetaminophen (TYLENOL EXTRA STRENGTH) 500 mg tablet Take 500 mg by mouth every six (6) hours as needed for Pain. albuterol (VENTOLIN HFA) 90 mcg/actuation inhaler Take 2 Puffs by inhalation every four (4) hours as needed for Wheezing. Qty: 1 Inhaler, Refills: 0      LORazepam (ATIVAN) 0.5 mg tablet Take 1 Tab by mouth every four (4) hours as needed for Anxiety. Max Daily Amount: 3 mg.  Indications: Nausea and Vomiting caused by Cancer Drugs  Qty: 30 Tab, Refills: 1    Associated Diagnoses: Malignant neoplasm of head of pancreas (HCC)      dutasteride (AVODART) 0.5 mg capsule Take 0.5 mg by mouth daily. STOP taking these medications       METOPROLOL TARTRATE PO Comments:   Reason for Stopping:         amLODIPine (NORVASC) 5 mg tablet Comments:   Reason for Stopping: Follow-up Information     Follow up With Specialties Details Why Contact Víctor Barreto MD Internal Medicine In 1 week  [facility]  [id number]      Marija Malagon MD Cardiology In 1 week  [address] [place]      José Miguel Torres MD Cardiology   [facility]  [address]  [phone]            Final discharge diagnoses and brief hospital course  Gi Ortiz a [de-identified] y. o. male with past medical history significant for pancreatic cancer on chemotherapy, CAD, hypertension presented to the emergency room after having several episodes today of dizziness/diet lightheadedness while standing.  Patient states that for the last several weeks he has episode of dizziness resulting in passing out.  Episodes can happen during exertion and at rest. Denies chest pain, SOB, palpitations, nausea prior to syncope episode. He was seen by his cardiologist who started on Midodrine for orthostatic hypotension. He reports compliance with meds but still having symptoms. He has been experiencing increased LEVY for several weeks now. No cough or pain    Recurrent Syncope   -  cardiac origin like from pauses in tele monitoring   - normal Troponin, ECG just showed non specific t wave changes. No arrhythmia noted. - CT of the head negative. - CTA chest: no PE.  - Echo: 30 - 35%. Abnormal left ventricular strain.  There is a possible mass noted in the right atrium  - Orthostatic positive  - Carotid doppler: Consistent with less than 50% stenosis of the right and left internal carotid   - Cardiology on board   - c/w midodrine 5mg tid      Afib with RVR - new onset  - c/w metoprolol  - pt on Xarelto for PE  - cardiology planning for outpt ablation with Dr. Gentile Sensor next week      Systolic CHF with NYHA class III on poa  Hx CAD s/p CABG jn 2012  - Echo with EF 30-35%  - cardiology started on metoprolol succinate 7/31  - c/w aspirin, Ranexa.       Hypertension: BP stable. Continue metoprolol. Amlodipine discontinued      Hx  PE: on xarelto  Hyponatremia: improved, will monitor      Pancreatitic cancer: on chemo. f/u with oncology outpt   BPH: continue with home meds finasteride, flomax     Discharge recommendations:  PCP f/u in1 week  Cardiology in 1 week  EP Michelle Green for ablation next week  No Driving    Discharge plan discussed in detail with patient and his daughter at bedside. They understood and agreed. Physical examination at discharge  Visit Vitals  BP 95/58 (BP 1 Location: Left arm)   Pulse 81   Temp 97.9 °F (36.6 °C)   Resp 19   Ht 6' (1.829 m)   Wt 79.7 kg (175 lb 9.6 oz)   SpO2 95%   BMI 23.82 kg/m²     Gen: NAD  HEENT: anicteric sclerae, normal conjunctiva, oropharynx clear, MM moist  Neck: supple, trachea midline, no adenopathy  Heart: RRR, no MRG, no JVD, no peripheral edema  Lungs: CTA b/l, non-labored respirations  Abd: soft, NT, ND, BS+, no organomegaly  Extr: warm  Skin: dry, no rash  Neuro: CN II-XII grossly intact, normal speech, moves all extremities  Psych: normal mood, appropriate affect    Pertinent imaging studies:    Per EMR    Recent Labs     07/31/20  2354 07/30/20  1341   WBC 2.9* 3.8*   HGB 10.8* 12.1   HCT 32.0* 35.7*    330     Recent Labs     07/31/20  2354 07/30/20  1341   * 130*   K 4.1 4.6    96*   CO2 21 26   BUN 14 15   CREA 0.83 0.90   * 97   CA 7.9* 8.6   MG 2.0 2.0     Recent Labs     07/30/20  1341   AP 70   TP 6.5   ALB 3.3*   GLOB 3.2     Recent Labs     07/30/20  1341   INR 1.3*   PTP 12.7*      No results for input(s): FE, TIBC, PSAT, FERR in the last 72 hours.    No results for input(s): PH, PCO2, PO2 in the last 72 hours. No results for input(s): CPK, CKMB in the last 72 hours.     No lab exists for component: TROPONINI  No components found for: [place]    Chronic Diagnoses:    Problem List as of 8/2/2020 Date Reviewed: 7/15/2020          Codes Class Noted - Resolved    Syncope ICD-10-CM: R55  ICD-9-CM: 780.2  7/30/2020 - Present        Malignant neoplasm of head of pancreas ([address] ICD-10-CM: C25.0  ICD-9-CM: 157.0  1/18/2019 - Present        Choroidal nevus of right eye ICD-10-CM: D31.31  ICD-9-CM: 224.6  5/2/2018 - Present    Overview Signed 10/29/2018  1:15 PM by Vadim Gonzalez MD     Last Assessment & Plan:   Stable appearance             Fuchs' corneal dystrophy ICD-10-CM: H18.51  ICD-9-CM: 371.57  5/2/2018 - Present    Overview Signed 10/29/2018  1:15 PM by Vadim Gonzalez MD     Last Assessment & Plan:   mild             Glaucoma suspect of both eyes ICD-10-CM: H40.003  ICD-9-CM: 365.00  5/2/2018 - Present    Overview Signed 10/29/2018  1:15 PM by Vadim Gonzalez MD     Last Assessment & Plan:   OCT and IOP good             Constipation by delayed colonic transit ICD-10-CM: K59.01  ICD-9-CM: 564.01  11/30/2017 - Present        Gastroesophageal reflux disease without esophagitis ICD-10-CM: K21.9  ICD-9-CM: 530.81  11/30/2017 - Present        Essential hypertension ICD-10-CM: I10  ICD-9-CM: 401.9  11/30/2017 - Present        Complete tear of left rotator cuff ICD-10-CM: M75.122  ICD-9-CM: 727.61  9/14/2017 - Present        Spondylolisthesis ICD-10-CM: M43.10  ICD-9-CM: 756.12  9/27/2016 - Present        Lumbosacral spondylosis without myelopathy ICD-10-CM: M47.817  ICD-9-CM: 721.3  Unknown - Present        Osteoarthritis of multiple joints ICD-10-CM: M15.9  ICD-9-CM: 715.89  Unknown - Present        Hypercholesterolemia ICD-10-CM: E78.00  ICD-9-CM: 272.0  Unknown - Present        Varicose vein of leg ICD-10-CM: I83.90  ICD-9-CM: 454.9  12/11/2014 - Present        S/P CABG x 2 ICD-10-CM: Z95.1  ICD-9-CM: V45.81  1/9/2012 - Present        Coronary atherosclerosis of native coronary artery ICD-10-CM: I25.10  ICD-9-CM: 414.01  1/6/2012 - Present        Other and unspecified angina pectoris ICD-10-CM: I20.9  ICD-9-CM: 413.9  1/6/2012 - Present              Time spent on discharge related activities today greater than 30 minutes.       Signed:  Lata Galvan MD                 Hospitalist, Internal Medicine      Cc: Leni Holcomb MD

## 2020-08-04 LAB
LEFT CCA DIST DIAS: 13.4 CM/S
LEFT CCA DIST SYS: 62.2 CM/S
LEFT CCA PROX DIAS: 10.7 CM/S
LEFT CCA PROX SYS: 66.6 CM/S
LEFT ECA DIAS: 5.69 CM/S
LEFT ECA SYS: 86.4 CM/S
LEFT ICA DIST DIAS: 23.7 CM/S
LEFT ICA DIST SYS: 74.2 CM/S
LEFT ICA MID DIAS: 28.9 CM/S
LEFT ICA MID SYS: 89.7 CM/S
LEFT ICA PROX DIAS: 36 CM/S
LEFT ICA PROX SYS: 120 CM/S
LEFT ICA/CCA SYS: 1.93
LEFT VERTEBRAL DIAS: 13.03 CM/S
LEFT VERTEBRAL SYS: 42.2 CM/S
RIGHT CCA DIST DIAS: 11.3 CM/S
RIGHT CCA DIST SYS: 70.7 CM/S
RIGHT CCA PROX DIAS: 12.1 CM/S
RIGHT CCA PROX SYS: 100.8 CM/S
RIGHT ECA DIAS: 0 CM/S
RIGHT ECA SYS: 100.3 CM/S
RIGHT ICA DIST DIAS: 20.2 CM/S
RIGHT ICA DIST SYS: 62.6 CM/S
RIGHT ICA MID DIAS: 18.6 CM/S
RIGHT ICA MID SYS: 67.2 CM/S
RIGHT ICA PROX DIAS: 15.8 CM/S
RIGHT ICA PROX SYS: 72.1 CM/S
RIGHT ICA/CCA SYS: 1
RIGHT VERTEBRAL DIAS: 14.22 CM/S
RIGHT VERTEBRAL SYS: 50 CM/S

## 2020-08-05 ENCOUNTER — TELEPHONE (OUTPATIENT)
Dept: ONCOLOGY | Age: 80
End: 2020-08-05

## 2020-08-05 NOTE — TELEPHONE ENCOUNTER
Returned patients call. HIPAA verified. Patient has had SOB, and passing out. He has been seeing DR Leandro Flores, DX A fib. Dr Leandro Flores not convienced Ablation is necessary, so she wants him to wear a heart monitor between now and September 11th, and see how this works before the Ablation which is schedule for Septebmer 11th.    /  Patient is scheduled for Chemo on Monday 8/10/2020. He will bring in the new medication given by DR Leandro Flores at that visit.

## 2020-08-06 RX ORDER — DEXTROSE MONOHYDRATE 50 MG/ML
25 INJECTION, SOLUTION INTRAVENOUS CONTINUOUS
Status: CANCELLED | OUTPATIENT
Start: 2020-08-17

## 2020-08-06 RX ORDER — DIPHENHYDRAMINE HYDROCHLORIDE 50 MG/ML
50 INJECTION, SOLUTION INTRAMUSCULAR; INTRAVENOUS AS NEEDED
Status: CANCELLED
Start: 2020-11-02

## 2020-08-06 RX ORDER — HYDROCORTISONE SODIUM SUCCINATE 100 MG/2ML
100 INJECTION, POWDER, FOR SOLUTION INTRAMUSCULAR; INTRAVENOUS AS NEEDED
Status: CANCELLED | OUTPATIENT
Start: 2020-11-02

## 2020-08-06 RX ORDER — SODIUM CHLORIDE 0.9 % (FLUSH) 0.9 %
10 SYRINGE (ML) INJECTION AS NEEDED
Status: CANCELLED
Start: 2020-11-02

## 2020-08-06 RX ORDER — HEPARIN 100 UNIT/ML
300-500 SYRINGE INTRAVENOUS AS NEEDED
Status: CANCELLED | OUTPATIENT
Start: 2020-08-17

## 2020-08-06 RX ORDER — HEPARIN 100 UNIT/ML
300-500 SYRINGE INTRAVENOUS AS NEEDED
Status: CANCELLED | OUTPATIENT
Start: 2020-11-04

## 2020-08-06 RX ORDER — HEPARIN 100 UNIT/ML
300-500 SYRINGE INTRAVENOUS AS NEEDED
Status: CANCELLED | OUTPATIENT
Start: 2020-08-19

## 2020-08-06 RX ORDER — PALONOSETRON 0.05 MG/ML
0.25 INJECTION, SOLUTION INTRAVENOUS ONCE
Status: CANCELLED
Start: 2020-11-02

## 2020-08-06 RX ORDER — ONDANSETRON 2 MG/ML
8 INJECTION INTRAMUSCULAR; INTRAVENOUS AS NEEDED
Status: CANCELLED | OUTPATIENT
Start: 2020-11-02

## 2020-08-06 RX ORDER — ALBUTEROL SULFATE 0.83 MG/ML
2.5 SOLUTION RESPIRATORY (INHALATION) AS NEEDED
Status: CANCELLED
Start: 2020-11-02

## 2020-08-06 RX ORDER — SODIUM CHLORIDE 0.9 % (FLUSH) 0.9 %
10 SYRINGE (ML) INJECTION AS NEEDED
Status: CANCELLED | OUTPATIENT
Start: 2020-11-02

## 2020-08-06 RX ORDER — FLUOROURACIL 50 MG/ML
300 INJECTION, SOLUTION INTRAVENOUS ONCE
Status: CANCELLED | OUTPATIENT
Start: 2020-08-17 | End: 2020-08-17

## 2020-08-06 RX ORDER — ONDANSETRON 2 MG/ML
8 INJECTION INTRAMUSCULAR; INTRAVENOUS AS NEEDED
Status: CANCELLED | OUTPATIENT
Start: 2020-08-17

## 2020-08-06 RX ORDER — DIPHENHYDRAMINE HYDROCHLORIDE 50 MG/ML
50 INJECTION, SOLUTION INTRAMUSCULAR; INTRAVENOUS AS NEEDED
Status: CANCELLED | OUTPATIENT
Start: 2020-08-17

## 2020-08-06 RX ORDER — SODIUM CHLORIDE 9 MG/ML
10 INJECTION INTRAMUSCULAR; INTRAVENOUS; SUBCUTANEOUS AS NEEDED
Status: CANCELLED | OUTPATIENT
Start: 2020-11-04

## 2020-08-06 RX ORDER — SODIUM CHLORIDE 0.9 % (FLUSH) 0.9 %
10 SYRINGE (ML) INJECTION AS NEEDED
Status: CANCELLED
Start: 2020-08-17

## 2020-08-06 RX ORDER — ATROPINE SULFATE 0.4 MG/ML
0.4 INJECTION, SOLUTION ENDOTRACHEAL; INTRAMEDULLARY; INTRAMUSCULAR; INTRAVENOUS; SUBCUTANEOUS
Status: CANCELLED | OUTPATIENT
Start: 2020-08-17

## 2020-08-06 RX ORDER — ATROPINE SULFATE 0.4 MG/ML
0.4 INJECTION, SOLUTION ENDOTRACHEAL; INTRAMEDULLARY; INTRAMUSCULAR; INTRAVENOUS; SUBCUTANEOUS ONCE
Status: CANCELLED | OUTPATIENT
Start: 2020-08-17

## 2020-08-06 RX ORDER — HEPARIN 100 UNIT/ML
300-500 SYRINGE INTRAVENOUS AS NEEDED
Status: CANCELLED | OUTPATIENT
Start: 2020-11-02

## 2020-08-06 RX ORDER — PALONOSETRON 0.05 MG/ML
0.25 INJECTION, SOLUTION INTRAVENOUS ONCE
Status: CANCELLED
Start: 2020-08-17

## 2020-08-06 RX ORDER — DIPHENHYDRAMINE HYDROCHLORIDE 50 MG/ML
50 INJECTION, SOLUTION INTRAMUSCULAR; INTRAVENOUS AS NEEDED
Status: CANCELLED
Start: 2020-08-17

## 2020-08-06 RX ORDER — SODIUM CHLORIDE 0.9 % (FLUSH) 0.9 %
10 SYRINGE (ML) INJECTION AS NEEDED
Status: CANCELLED | OUTPATIENT
Start: 2020-08-17

## 2020-08-06 RX ORDER — FLUOROURACIL 50 MG/ML
300 INJECTION, SOLUTION INTRAVENOUS ONCE
Status: CANCELLED | OUTPATIENT
Start: 2020-11-02

## 2020-08-06 RX ORDER — HYDROCORTISONE SODIUM SUCCINATE 100 MG/2ML
100 INJECTION, POWDER, FOR SOLUTION INTRAMUSCULAR; INTRAVENOUS AS NEEDED
Status: CANCELLED | OUTPATIENT
Start: 2020-08-17

## 2020-08-06 RX ORDER — ACETAMINOPHEN 325 MG/1
650 TABLET ORAL AS NEEDED
Status: CANCELLED | OUTPATIENT
Start: 2020-11-02

## 2020-08-06 RX ORDER — ACETAMINOPHEN 325 MG/1
650 TABLET ORAL AS NEEDED
Status: CANCELLED | OUTPATIENT
Start: 2020-08-17

## 2020-08-06 RX ORDER — EPINEPHRINE 1 MG/ML
0.3 INJECTION, SOLUTION, CONCENTRATE INTRAVENOUS AS NEEDED
Status: CANCELLED | OUTPATIENT
Start: 2020-08-17

## 2020-08-06 RX ORDER — SODIUM CHLORIDE 9 MG/ML
10 INJECTION INTRAMUSCULAR; INTRAVENOUS; SUBCUTANEOUS AS NEEDED
Status: CANCELLED | OUTPATIENT
Start: 2020-08-19

## 2020-08-06 RX ORDER — ALBUTEROL SULFATE 0.83 MG/ML
2.5 SOLUTION RESPIRATORY (INHALATION) AS NEEDED
Status: CANCELLED
Start: 2020-08-17

## 2020-08-06 RX ORDER — ATROPINE SULFATE 0.4 MG/ML
0.4 INJECTION, SOLUTION ENDOTRACHEAL; INTRAMEDULLARY; INTRAMUSCULAR; INTRAVENOUS; SUBCUTANEOUS
Status: CANCELLED | OUTPATIENT
Start: 2020-11-02

## 2020-08-06 RX ORDER — ATROPINE SULFATE 0.4 MG/ML
0.4 INJECTION, SOLUTION ENDOTRACHEAL; INTRAMEDULLARY; INTRAMUSCULAR; INTRAVENOUS; SUBCUTANEOUS ONCE
Status: CANCELLED | OUTPATIENT
Start: 2020-11-02

## 2020-08-06 RX ORDER — SODIUM CHLORIDE 0.9 % (FLUSH) 0.9 %
10 SYRINGE (ML) INJECTION AS NEEDED
Status: CANCELLED
Start: 2020-11-04

## 2020-08-06 RX ORDER — EPINEPHRINE 1 MG/ML
0.3 INJECTION, SOLUTION, CONCENTRATE INTRAVENOUS AS NEEDED
Status: CANCELLED | OUTPATIENT
Start: 2020-11-02

## 2020-08-06 RX ORDER — DIPHENHYDRAMINE HYDROCHLORIDE 50 MG/ML
50 INJECTION, SOLUTION INTRAMUSCULAR; INTRAVENOUS AS NEEDED
Status: CANCELLED | OUTPATIENT
Start: 2020-11-02

## 2020-08-06 RX ORDER — DEXTROSE MONOHYDRATE 50 MG/ML
25 INJECTION, SOLUTION INTRAVENOUS CONTINUOUS
Status: CANCELLED | OUTPATIENT
Start: 2020-11-02

## 2020-08-06 RX ORDER — SODIUM CHLORIDE 0.9 % (FLUSH) 0.9 %
10 SYRINGE (ML) INJECTION AS NEEDED
Status: CANCELLED
Start: 2020-08-19

## 2020-08-08 NOTE — ED PROVIDER NOTES
This is a [de-identified] yo male with a history of several weeks of near syncope and syncope, with dizziness with standing or at rest. He had been treated by cardiology with Mididrine for orthostatic hypotension, Patient has been compliant with meds, but continues with symptoms. He has denied any chest pain, SOB, fever or chills. No N/V or other GI/ symptoms noted either. The patient presents for a continuation of the symptoms outlined above. He has not sustained any injury from these falls with syncope. It is noted the patient has a history of 3 cardiac stents and hypertension. Past Medical History:   Diagnosis Date    CAD (coronary artery disease)     3 cardiac stents   heart Dr. Gibran Chan Cervical spondylosis without myelopathy 2009    Chronic pain     shoulder left, upper lumbar    Depressive disorder, not elsewhere classified     Diaphragmatic hernia without mention of obstruction or gangrene     GERD (gastroesophageal reflux disease)     Hypercholesterolemia     Hypertension     Hypertrophy of prostate without urinary obstruction and other lower urinary tract symptoms (LUTS) 2008    Insomnia, unspecified 2009    Lumbosacral spondylosis without myelopathy 2009    Osteoarthrosis involving, or with mention of more than one site, but not specified as generalized, multiple sites 2010    Varicose veins 12/11/2014       Past Surgical History:   Procedure Laterality Date    CABG, ARTERY-VEIN, TWO  1/9/12    CABG    HX APPENDECTOMY  1962    HX CATARACT REMOVAL  2015    bilateral    HX COLONOSCOPY  2010    x2 small beign polyps    HX COLONOSCOPY  2017    hyperplastic polyp    HX ENDOSCOPY  2010    acid reflux    HX HEART CATHETERIZATION  1998, 2001    3 stents    HX HEENT  1962    fx nose repair    HX HERNIA REPAIR  early 1980's    left inguinal, with mesh    HX ORTHOPAEDIC  2017    lumbar fusion    HX OTHER SURGICAL  7 yrs.  old    Hiatal Hernia    HX TONSILLECTOMY      HX VASCULAR ACCESS      IR INSERT TUNL CVAD W PORT LESS THAN 5 YR  2019         Family History:   Problem Relation Age of Onset    Hypertension Father     Heart Disease Father     Arthritis-osteo Mother     Cancer Brother 78        tumor in back    Heart Disease Brother     Diabetes Maternal Grandfather     Kidney Disease Maternal Grandfather        Social History     Socioeconomic History    Marital status:      Spouse name: Not on file    Number of children: Not on file    Years of education: Not on file    Highest education level: Not on file   Occupational History    Not on file   Social Needs    Financial resource strain: Not on file    Food insecurity     Worry: Not on file     Inability: Not on file   [employer] needs     Medical: Not on file     Non-medical: Not on file   Tobacco Use    Smoking status: Former Smoker     Packs/day: 1.00     Years: 10.00     Pack years: 10.00     Types: Cigarettes     Start date: 1961     Last attempt to quit: 1971     Years since quittin.6    Smokeless tobacco: Never Used   Substance and Sexual Activity    Alcohol use: No     Frequency: Never     Binge frequency: Never    Drug use: No    Sexual activity: Yes   Lifestyle    Physical activity     Days per week: Not on file     Minutes per session: Not on file    Stress: Not on file   Relationships    Social connections     Talks on phone: Not on file     Gets together: Not on file     Attends [employer] witness service: Not on file     Active member of club or organization: Not on file     Attends meetings of clubs or organizations: Not on file     Relationship status: Not on file    Intimate partner violence     Fear of current or ex partner: Not on file     Emotionally abused: Not on file     Physically abused: Not on file     Forced sexual activity: Not on file   Other Topics Concern     Service No    Blood Transfusions No    Caffeine Concern No    Occupational Exposure Yes    Hobby Hazards No    Sleep Concern Not Asked    Stress Concern No    Weight Concern Yes    Special Diet No    Back Care No    Exercise Yes    Bike Helmet No    Seat Belt Yes    Self-Exams No   Social History Narrative    Not on file         ALLERGIES: Patient has no known allergies. Review of Systems   Constitutional: Negative for activity change, appetite change and fatigue. HENT: Negative for ear pain, facial swelling, sore throat and trouble swallowing. Eyes: Negative for pain, discharge and visual disturbance. Respiratory: Negative for chest tightness, shortness of breath and wheezing. Cardiovascular: Negative for chest pain and palpitations. Gastrointestinal: Negative for abdominal pain, blood in stool, nausea and vomiting. Genitourinary: Negative for difficulty urinating, flank pain and hematuria. Musculoskeletal: Negative for arthralgias, joint swelling, myalgias and neck pain. Skin: Negative for color change and rash. Neurological: Positive for dizziness, syncope and weakness. Negative for numbness and headaches. Hematological: Negative for adenopathy. Does not bruise/bleed easily. Psychiatric/Behavioral: Negative for behavioral problems, confusion and sleep disturbance. All other systems reviewed and are negative. Vitals:    08/02/20 0549 08/02/20 0823 08/02/20 0847 08/02/20 1008   BP: 95/56 116/70  95/58   Pulse: 85 82 (!) 145 81   Resp: 18   19   Temp: 97.8 °F (36.6 °C)   97.9 °F (36.6 °C)   SpO2: 96%   95%   Weight:       Height:                Physical Exam  Vitals signs and nursing note reviewed. Constitutional:       General: He is not in acute distress. Appearance: He is well-developed. HENT:      Head: Normocephalic and atraumatic. Nose: Nose normal.   Eyes:      General: No scleral icterus. Conjunctiva/sclera: Conjunctivae normal.      Pupils: Pupils are equal, round, and reactive to light.    Neck:      Musculoskeletal: Normal range of motion and neck supple. Thyroid: No thyromegaly. Vascular: No JVD. Trachea: No tracheal deviation. Comments: No carotid bruits noted. Cardiovascular:      Rate and Rhythm: Normal rate and regular rhythm. Heart sounds: Normal heart sounds. No murmur. No friction rub. No gallop. Pulmonary:      Effort: Pulmonary effort is normal. No respiratory distress. Breath sounds: Normal breath sounds. No wheezing or rales. Chest:      Chest wall: No tenderness. Abdominal:      General: Bowel sounds are normal. There is no distension. Palpations: Abdomen is soft. There is no mass. Tenderness: There is no abdominal tenderness. There is no guarding or rebound. Musculoskeletal: Normal range of motion. General: No tenderness. Lymphadenopathy:      Cervical: No cervical adenopathy. Skin:     General: Skin is warm and dry. Findings: No erythema or rash. Neurological:      Mental Status: He is alert and oriented to person, place, and time. Cranial Nerves: No cranial nerve deficit. Coordination: Coordination normal.      Deep Tendon Reflexes: Reflexes are normal and symmetric. Psychiatric:         Behavior: Behavior normal.         Thought Content:  Thought content normal.         Judgment: Judgment normal.          MDM  Number of Diagnoses or Management Options     Amount and/or Complexity of Data Reviewed  Clinical lab tests: ordered and reviewed  Tests in the radiology section of CPT®: ordered and reviewed  Decide to obtain previous medical records or to obtain history from someone other than the patient: yes  Review and summarize past medical records: yes  Discuss the patient with other providers: yes  Independent visualization of images, tracings, or specimens: yes    Risk of Complications, Morbidity, and/or Mortality  Presenting problems: high  Diagnostic procedures: high  Management options: high    Patient Progress  Patient progress: stable Procedures    The patient has been evaluated with lab and x rays. No clear etiology of the symptoms are noted. I have asked the hospitalist to see and consider for admission with recurrent orthostasis and occasional syncope.

## 2020-08-10 NOTE — PROGRESS NOTES
Lauren Gentile is a 79 y.o. male who is seen for follow up pancreatic cancer. Patient scheduled for C29D1 today in [address] [place] [address]. Patient has been seeing Dr Robin Anna for cardiac issues, she had ordered Amiodarone HCL 100mg daily which the pharmacy told him there could be a drug- drug interaction with Ranolazine. He has not started the Amiodarone as of today. UPDATE meds, see sheet in In box. Chemotherapy Flowsheet 7/29/2020   Cycle C36 D3   Date 7/29/2020   Drug / Regimen CIV 5FU   Dosage -   Time Up -   Time Down -   Pre Hydration -   Pre Meds -   Notes D/C Pump     Key Oncology Meds             ondansetron (ZOFRAN ODT) 4 mg disintegrating tablet Take 1 Tab by mouth every eight (8) hours as needed for Nausea. Indications: Nausea and Vomiting caused by Cancer Drugs        Key Pain Meds             ibuprofen (MOTRIN) 200 mg tablet Take 400 mg by mouth daily as needed for Pain.     acetaminophen (TYLENOL EXTRA STRENGTH) 500 mg tablet Take 500 mg by mouth every six (6) hours as needed for Pain.

## 2020-08-12 ENCOUNTER — OFFICE VISIT (OUTPATIENT)
Dept: ONCOLOGY | Age: 80
End: 2020-08-12

## 2020-08-12 VITALS
WEIGHT: 180 LBS | BODY MASS INDEX: 24.38 KG/M2 | TEMPERATURE: 97.3 F | DIASTOLIC BLOOD PRESSURE: 81 MMHG | OXYGEN SATURATION: 97 % | RESPIRATION RATE: 16 BRPM | SYSTOLIC BLOOD PRESSURE: 128 MMHG | HEIGHT: 72 IN | HEART RATE: 76 BPM

## 2020-08-12 DIAGNOSIS — C25.0 MALIGNANT NEOPLASM OF HEAD OF PANCREAS (HCC): Primary | ICD-10-CM

## 2020-08-12 RX ORDER — AMIODARONE HYDROCHLORIDE 100 MG/1
100 TABLET ORAL DAILY
COMMUNITY

## 2020-08-12 NOTE — PROGRESS NOTES
Reason for Visit:   Kahlil Navas is seen for follow up pancreatic cancer     Treatment History:   Dx: Metastatic Pancreatic Adenocarcinoma--Nov 2018--peritoneal mets  Tx: FOLFIRINOX--first cycle 2/4/19, second cycle 2/18/19, third cycle 3/4/19, fourth cycle on 3/18/19, 5th on 4/1/19, 6th 4/15/2019, 7th 4/29/19, 8th 5/13/19, 9th 6/5/19 (dose reduced due to toxicity), 10th 6/24/19, 11th 7/5/19, 12th 7/22/2019, 13th 8/5/2019, 14th 8/19/2019, 15th 8/9/2019, 16th 9/3/2019, 17th 9/23/2019, 18th 10/7/2019, 19 10/21/2019, 20 11/4/2019, 21 11/18/2019, 22 12/2/2019 (stopped the [place]), 23 12/16/2019 (no Oxali), 24 1/6/2020 (no Oxali), 25 1/20/2020 (no Oxali), 26 2/3/2020 (no Oxali), 27 2/17/2020 (no Oxali), 28 3/2/2020 (no Oxali), 29 3/16/2020 (no Oxali), 30 3/30/2020 (no Oxali), 31 4/13/2020 (no Oxali), 32 4/13/2020 (no Oxali), 33 5/11/2020 (no Oxali)---3 week break-- 34 6/1/2020 (no Oxali), 35 6/15/2020 (no Oxali), 36 6/29/2020 (no Oxaili), 37 7/13/2020 (no Oxali)  Goal: prolong survival, Cycle length: until progression, Will return to see me prior to each cycle. History of Present Illness:   Diagnosed with a-fib, on amio, may have ablation, concern of 5FU effects.   No symptoms    Past Medical History:   Diagnosis Date    CAD (coronary artery disease)     3 cardiac stents   heart Dr. Linda Rodríguez Cervical spondylosis without myelopathy 2009    Chronic pain     shoulder left, upper lumbar    Depressive disorder, not elsewhere classified     Diaphragmatic hernia without mention of obstruction or gangrene     GERD (gastroesophageal reflux disease)     Hypercholesterolemia     Hypertension     Hypertrophy of prostate without urinary obstruction and other lower urinary tract symptoms (LUTS) 2008    Insomnia, unspecified 2009    Lumbosacral spondylosis without myelopathy 2009    Osteoarthrosis involving, or with mention of more than one site, but not specified as generalized, multiple sites 2010    Varicose veins 2014      Past Surgical History:   Procedure Laterality Date    CABG, ARTERY-VEIN, TWO  12    CABG    HX APPENDECTOMY      HX CATARACT REMOVAL  2015    bilateral    HX COLONOSCOPY  2010    x2 small beign polyps    HX COLONOSCOPY  2017    hyperplastic polyp    HX ENDOSCOPY  2010    acid reflux    HX HEART CATHETERIZATION  ,     3 stents    HX HEENT      fx nose repair    HX HERNIA REPAIR  early     left inguinal, with mesh    HX ORTHOPAEDIC  2017    lumbar fusion    HX OTHER SURGICAL  7 yrs. old    Hiatal Hernia    HX TONSILLECTOMY      HX VASCULAR ACCESS      IR INSERT TUNL CVAD W PORT LESS THAN 5 YR  2019      Social History     Tobacco Use    Smoking status: Former Smoker     Packs/day: 1.00     Years: 10.00     Pack years: 10.00     Types: Cigarettes     Start date: 1961     Last attempt to quit: 1971     Years since quittin.6    Smokeless tobacco: Never Used   Substance Use Topics    Alcohol use: No     Frequency: Never     Binge frequency: Never      Family History   Problem Relation Age of Onset    Hypertension Father     Heart Disease Father     Arthritis-osteo Mother     Cancer Brother 78        tumor in back    Heart Disease Brother     Diabetes Maternal Grandfather     Kidney Disease Maternal Grandfather      Current Outpatient Medications   Medication Sig    midodrine (PROAMATINE) 5 mg tablet Take 1 Tab by mouth three (3) times daily (with meals).  metoprolol succinate (TOPROL-XL) 25 mg XL tablet Take 0.5 Tabs by mouth daily.  senna (Senna) 8.6 mg tablet Take 1 Tab by mouth daily.  cyclobenzaprine (FLEXERIL) 10 mg tablet Take 10 mg by mouth three (3) times daily as needed for Muscle Spasm(s).  finasteride (PROSCAR) 5 mg tablet Take 5 mg by mouth daily.  cyanocobalamin (VITAMIN B12) 500 mcg tablet Take 500 mcg by mouth daily.     docusate sodium (DOK) 250 mg capsule Take 750 mg by mouth two (2) times a day.  ranolazine ER (RANEXA) 500 mg SR tablet Take 1,000 mg by mouth two (2) times a day.  DULoxetine (CYMBALTA) 60 mg capsule TAKE 1 CAPSULE BY MOUTH DAILY. INDICATIONS: CHRONIC MUSCLE OR BONE PAIN    ibuprofen (MOTRIN) 200 mg tablet Take 400 mg by mouth daily as needed for Pain.  nortriptyline (PAMELOR) 25 mg capsule Take 2 Caps by mouth nightly. (Patient taking differently: Take 25 mg by mouth nightly.)    tamsulosin (FLOMAX) 0.4 mg capsule TAKE 2 CAPSULES BY MOUTH NIGHLTY (Patient taking differently: TAKE 2 CAPSULES BY MOUTH NIGHLTY and 1 AM)    ondansetron (ZOFRAN ODT) 4 mg disintegrating tablet Take 1 Tab by mouth every eight (8) hours as needed for Nausea. Indications: Nausea and Vomiting caused by Cancer Drugs    dexAMETHasone (DECADRON) 4 mg tablet Take 8 mg by mouth daily. Indications: Prevent Nausea and Vomiting from Cancer Chemotherapy    rosuvastatin (CRESTOR) 10 mg tablet Take 1 Tab by mouth nightly. Indications: excessive fat in the blood    rivaroxaban (XARELTO) 20 mg tab tablet Take 1 Tab by mouth daily (with breakfast). Indications: a clot in the lung    acetaminophen (TYLENOL EXTRA STRENGTH) 500 mg tablet Take 500 mg by mouth every six (6) hours as needed for Pain.  omeprazole (PRILOSEC) 20 mg capsule Take 20 mg by mouth daily.  polyethylene glycol (MIRALAX) 17 gram/dose powder Take 17 g by mouth daily as needed.  multivitamin (ONE A DAY) tablet Take 1 Tab by mouth daily.  aspirin 81 mg tablet Take 81 mg by mouth.  amiodarone (PACERONE) 100 mg tablet Take 100 mg by mouth daily.  albuterol (VENTOLIN HFA) 90 mcg/actuation inhaler Take 2 Puffs by inhalation every four (4) hours as needed for Wheezing.  LORazepam (ATIVAN) 0.5 mg tablet Take 1 Tab by mouth every four (4) hours as needed for Anxiety. Max Daily Amount: 3 mg.  Indications: Nausea and Vomiting caused by Cancer Drugs    dutasteride (AVODART) 0.5 mg capsule Take 0.5 mg by mouth daily. No current facility-administered medications for this visit. No Known Allergies     Review of Systems: A complete review of systems was obtained, negative except as described above. Physical Exam:     Visit Vitals  /81   Pulse 76   Temp 97.3 °F (36.3 °C) (Skin)   Resp 16   Ht 6' (1.829 m)   Wt 180 lb (81.6 kg)   SpO2 97%   BMI 24.41 kg/m²     ECOG PS: 0  General: No distress  Eyes: PERRLA, anicteric sclerae  HENT: Atraumatic, OP clear  Neck: Supple  Lymphatic: No cervical, supraclavicular, or inguinal adenopathy  Respiratory: CTAB, normal respiratory effort  CV: Normal rate, regular rhythm, no murmurs, no peripheral edema  GI: Soft, nontender, nondistended, no masses, no hepatomegaly, no splenomegaly  MS: Normal gait and station. Digits without clubbing or cyanosis. Skin: No rashes, ecchymoses, or petechiae. Normal temperature, turgor, and texture. Psych: Alert, oriented, appropriate affect, normal judgment/insight    Results:     Lab Results   Component Value Date/Time    WBC 2.9 (L) 07/31/2020 11:54 PM    HGB 10.8 (L) 07/31/2020 11:54 PM    HCT 32.0 (L) 07/31/2020 11:54 PM    PLATELET 349 02/77/3527 11:54 PM    .2 (H) 07/31/2020 11:54 PM    ABS. NEUTROPHILS 1.5 (L) 07/30/2020 01:41 PM     Lab Results   Component Value Date/Time    Sodium 134 (L) 07/31/2020 11:54 PM    Potassium 4.1 07/31/2020 11:54 PM    Chloride 105 07/31/2020 11:54 PM    CO2 21 07/31/2020 11:54 PM    Glucose 134 (H) 07/31/2020 11:54 PM    BUN 14 07/31/2020 11:54 PM    Creatinine 0.83 07/31/2020 11:54 PM    GFR est AA >60 07/31/2020 11:54 PM    GFR est non-AA >60 07/31/2020 11:54 PM    Calcium 7.9 (L) 07/31/2020 11:54 PM    Glucose (POC) 112 (H) 01/12/2012 06:32 AM     Lab Results   Component Value Date/Time    Bilirubin, total 0.5 07/30/2020 01:41 PM    ALT (SGPT) 29 07/30/2020 01:41 PM    Alk.  phosphatase 70 07/30/2020 01:41 PM    Protein, total 6.5 07/30/2020 01:41 PM    Albumin 3.3 (L) 07/30/2020 01:41 PM    Globulin 3.2 07/30/2020 01:41 PM       CT C/A/P 4/5/2019  Resolved pulmonary emboli possible persistent right femoral vein DVT. Positive carcinomatosis with interval decrease in amount of ascites. Incidental cholelithiasis, lower lumbar fusion and hepatic cysts     CT C/A/P 6/21/2019  1. Mild interval decrease in size of pancreatic head/neck mass. 2. Stable peritoneal carcinomatosis. New pocket of loculated ascites underneath  the right hemidiaphragm measuring 3.3 x 2.7 x 2.3 cm.  3. No evidence of metastatic disease in the chest.  4. Cholelithiasis      CT C/A/P 10/11/2019  IMPRESSION:  1. Further decrease in the size of the previously described small pancreatic  head mass lesion. 2. Continued evidence of focal nodular lesions in the anterior aspect of the  peritoneum compatible with carcinomatosis. No evidence of para-aortic  lymphadenopathy. 3. No focal intrathoracic nodules/mass lesions identified. No evidence of  mediastinal or hilar lymphadenopathy. 4. Persistence of the previously described focal, loculated collection of  ascites in the region of the right hemidiaphragm. 5. Normal sized liver. Persistence of the previously described multiple focal  low-density areas within liver suggestive of cysts. 6. Normal sized spleen. No focal splenic lesions identified. 7. Normal appearance of the kidneys. 8. Suboptimal distention of the urinary bladder (see discussion above) disease. 9. Relative enlargement of the prostate gland.     CT C/A/P 2/21/2020  IMPRESSION:  Stable small mass pancreatic neck. Unchanged peritoneal carcinomatosis and fluid  collection/peritoneal cystic lesion right subphrenic space. Stable hepatic  cysts. Cholelithiasis. No new evidence to suggest metastatic disease.     CT C/A/P 5/21/2020  IMPRESSION:  1. No change in the appearance of the previously described small mass lesion in  the region of the neck of the pancreas. Continued evidence of peritoneal  carcinomatosis.  No evidence of intra-abdominal, intrapelvic or inguinal  lymphadenopathy. 2. Normal sized liver with evidence of fatty infiltration. Presence of multiple  focal, well-demarcated fluid collections within the liver compatible with cysts. Stable appearance of the previously described subphrenic fluid collection. Continued evidence of cholelithiasis. 3. Presence of a moderate amount of gas and fecal material within the colon. 4. Normal appearance of the kidneys  5. Relative enlargement of the prostate gland. 6. Evidence of colonic diverticulosis. No evidence of diverticulitis.     Assessment:   1) Metastatic Pancreatic Carcinoma with Peritoneal Metastasis  2) Neoplasm Pain  3) PE  4) Back Pain  5) New CHF     Plan:   1) Continue with dose reduced FOLFIRINOX-Repeat Imaging in May showed stable disease, CA 19-9 has been up and down.  We have stopped the Oxaliplatin due to his worsening neuropathy--back to see me in 5 weeks  2) Pain controlled much better since stopping the oxaliplatin--stopped the Fentanyl and oxycodone on his own, continue the nortriptyline at bedtime and that did seem to help neuropathy and sleep--will continue.  Repeat 19-9 with each cycle  3) Continue Xarelto 20mg daily  4) Likely due to OA/DJD as it hasn't improved with the improving pancreatic ca. 5) Following with Cardiology--med management--is not currently on cardiotoxic chemotherapy. May be a-fib--possible ablation. Will plan two more cycles of chemotherpay then off until sees me in 5 weeks--to have ablation on 9/11.         Signed By: Nikita Arvizu MD

## 2020-08-13 RX ORDER — HEPARIN 100 UNIT/ML
300-500 SYRINGE INTRAVENOUS AS NEEDED
Status: CANCELLED | OUTPATIENT
Start: 2020-08-31

## 2020-08-13 RX ORDER — SODIUM CHLORIDE 0.9 % (FLUSH) 0.9 %
10 SYRINGE (ML) INJECTION AS NEEDED
Status: CANCELLED | OUTPATIENT
Start: 2020-08-31

## 2020-08-13 RX ORDER — SODIUM CHLORIDE 9 MG/ML
10 INJECTION INTRAMUSCULAR; INTRAVENOUS; SUBCUTANEOUS AS NEEDED
Status: CANCELLED | OUTPATIENT
Start: 2020-09-16

## 2020-08-13 RX ORDER — ACETAMINOPHEN 325 MG/1
650 TABLET ORAL AS NEEDED
Status: CANCELLED | OUTPATIENT
Start: 2020-09-28

## 2020-08-13 RX ORDER — DIPHENHYDRAMINE HYDROCHLORIDE 50 MG/ML
50 INJECTION, SOLUTION INTRAMUSCULAR; INTRAVENOUS AS NEEDED
Status: CANCELLED
Start: 2020-10-26

## 2020-08-13 RX ORDER — ACETAMINOPHEN 325 MG/1
650 TABLET ORAL AS NEEDED
Status: CANCELLED | OUTPATIENT
Start: 2020-10-26

## 2020-08-13 RX ORDER — DEXTROSE MONOHYDRATE 50 MG/ML
25 INJECTION, SOLUTION INTRAVENOUS CONTINUOUS
Status: CANCELLED | OUTPATIENT
Start: 2020-08-31

## 2020-08-13 RX ORDER — ALBUTEROL SULFATE 0.83 MG/ML
2.5 SOLUTION RESPIRATORY (INHALATION) AS NEEDED
Status: CANCELLED
Start: 2020-08-31

## 2020-08-13 RX ORDER — FLUOROURACIL 50 MG/ML
300 INJECTION, SOLUTION INTRAVENOUS ONCE
Status: CANCELLED | OUTPATIENT
Start: 2020-10-26

## 2020-08-13 RX ORDER — EPINEPHRINE 1 MG/ML
0.3 INJECTION, SOLUTION, CONCENTRATE INTRAVENOUS AS NEEDED
Status: CANCELLED | OUTPATIENT
Start: 2020-08-31

## 2020-08-13 RX ORDER — DIPHENHYDRAMINE HYDROCHLORIDE 50 MG/ML
50 INJECTION, SOLUTION INTRAMUSCULAR; INTRAVENOUS AS NEEDED
Status: CANCELLED | OUTPATIENT
Start: 2020-08-31

## 2020-08-13 RX ORDER — HYDROCORTISONE SODIUM SUCCINATE 100 MG/2ML
100 INJECTION, POWDER, FOR SOLUTION INTRAMUSCULAR; INTRAVENOUS AS NEEDED
Status: CANCELLED | OUTPATIENT
Start: 2020-08-31

## 2020-08-13 RX ORDER — PALONOSETRON 0.05 MG/ML
0.25 INJECTION, SOLUTION INTRAVENOUS ONCE
Status: CANCELLED
Start: 2020-10-26

## 2020-08-13 RX ORDER — HYDROCORTISONE SODIUM SUCCINATE 100 MG/2ML
100 INJECTION, POWDER, FOR SOLUTION INTRAMUSCULAR; INTRAVENOUS AS NEEDED
Status: CANCELLED | OUTPATIENT
Start: 2020-09-28

## 2020-08-13 RX ORDER — DIPHENHYDRAMINE HYDROCHLORIDE 50 MG/ML
50 INJECTION, SOLUTION INTRAMUSCULAR; INTRAVENOUS AS NEEDED
Status: CANCELLED | OUTPATIENT
Start: 2020-10-26

## 2020-08-13 RX ORDER — HEPARIN 100 UNIT/ML
300-500 SYRINGE INTRAVENOUS AS NEEDED
Status: CANCELLED | OUTPATIENT
Start: 2020-09-28

## 2020-08-13 RX ORDER — ATROPINE SULFATE 0.4 MG/ML
0.4 INJECTION, SOLUTION ENDOTRACHEAL; INTRAMEDULLARY; INTRAMUSCULAR; INTRAVENOUS; SUBCUTANEOUS
Status: CANCELLED | OUTPATIENT
Start: 2020-08-31

## 2020-08-13 RX ORDER — DIPHENHYDRAMINE HYDROCHLORIDE 50 MG/ML
50 INJECTION, SOLUTION INTRAMUSCULAR; INTRAVENOUS AS NEEDED
Status: CANCELLED
Start: 2020-08-31

## 2020-08-13 RX ORDER — SODIUM CHLORIDE 9 MG/ML
10 INJECTION INTRAMUSCULAR; INTRAVENOUS; SUBCUTANEOUS AS NEEDED
Status: CANCELLED | OUTPATIENT
Start: 2020-10-28

## 2020-08-13 RX ORDER — ATROPINE SULFATE 0.4 MG/ML
0.4 INJECTION, SOLUTION ENDOTRACHEAL; INTRAMEDULLARY; INTRAMUSCULAR; INTRAVENOUS; SUBCUTANEOUS
Status: CANCELLED | OUTPATIENT
Start: 2020-09-28

## 2020-08-13 RX ORDER — SODIUM CHLORIDE 0.9 % (FLUSH) 0.9 %
10 SYRINGE (ML) INJECTION AS NEEDED
Status: CANCELLED
Start: 2020-09-28

## 2020-08-13 RX ORDER — PALONOSETRON 0.05 MG/ML
0.25 INJECTION, SOLUTION INTRAVENOUS ONCE
Status: CANCELLED
Start: 2020-08-31

## 2020-08-13 RX ORDER — EPINEPHRINE 1 MG/ML
0.3 INJECTION, SOLUTION, CONCENTRATE INTRAVENOUS AS NEEDED
Status: CANCELLED | OUTPATIENT
Start: 2020-09-28

## 2020-08-13 RX ORDER — HEPARIN 100 UNIT/ML
300-500 SYRINGE INTRAVENOUS AS NEEDED
Status: CANCELLED | OUTPATIENT
Start: 2020-09-30

## 2020-08-13 RX ORDER — ONDANSETRON 2 MG/ML
8 INJECTION INTRAMUSCULAR; INTRAVENOUS AS NEEDED
Status: CANCELLED | OUTPATIENT
Start: 2020-09-28

## 2020-08-13 RX ORDER — SODIUM CHLORIDE 0.9 % (FLUSH) 0.9 %
10 SYRINGE (ML) INJECTION AS NEEDED
Status: CANCELLED
Start: 2020-08-31

## 2020-08-13 RX ORDER — HEPARIN 100 UNIT/ML
300-500 SYRINGE INTRAVENOUS AS NEEDED
Status: CANCELLED | OUTPATIENT
Start: 2020-10-28

## 2020-08-13 RX ORDER — DIPHENHYDRAMINE HYDROCHLORIDE 50 MG/ML
50 INJECTION, SOLUTION INTRAMUSCULAR; INTRAVENOUS AS NEEDED
Status: CANCELLED
Start: 2020-09-28

## 2020-08-13 RX ORDER — DIPHENHYDRAMINE HYDROCHLORIDE 50 MG/ML
50 INJECTION, SOLUTION INTRAMUSCULAR; INTRAVENOUS AS NEEDED
Status: CANCELLED | OUTPATIENT
Start: 2020-09-28

## 2020-08-13 RX ORDER — ATROPINE SULFATE 0.4 MG/ML
0.4 INJECTION, SOLUTION ENDOTRACHEAL; INTRAMEDULLARY; INTRAMUSCULAR; INTRAVENOUS; SUBCUTANEOUS
Status: CANCELLED | OUTPATIENT
Start: 2020-10-26

## 2020-08-13 RX ORDER — HYDROCORTISONE SODIUM SUCCINATE 100 MG/2ML
100 INJECTION, POWDER, FOR SOLUTION INTRAMUSCULAR; INTRAVENOUS AS NEEDED
Status: CANCELLED | OUTPATIENT
Start: 2020-10-26

## 2020-08-13 RX ORDER — HEPARIN 100 UNIT/ML
300-500 SYRINGE INTRAVENOUS AS NEEDED
Status: CANCELLED | OUTPATIENT
Start: 2020-09-16

## 2020-08-13 RX ORDER — ALBUTEROL SULFATE 0.83 MG/ML
2.5 SOLUTION RESPIRATORY (INHALATION) AS NEEDED
Status: CANCELLED
Start: 2020-10-26

## 2020-08-13 RX ORDER — PALONOSETRON 0.05 MG/ML
0.25 INJECTION, SOLUTION INTRAVENOUS ONCE
Status: CANCELLED
Start: 2020-09-28

## 2020-08-13 RX ORDER — HEPARIN 100 UNIT/ML
300-500 SYRINGE INTRAVENOUS AS NEEDED
Status: CANCELLED | OUTPATIENT
Start: 2020-10-26

## 2020-08-13 RX ORDER — SODIUM CHLORIDE 0.9 % (FLUSH) 0.9 %
10 SYRINGE (ML) INJECTION AS NEEDED
Status: CANCELLED
Start: 2020-10-26

## 2020-08-13 RX ORDER — DEXTROSE MONOHYDRATE 50 MG/ML
25 INJECTION, SOLUTION INTRAVENOUS CONTINUOUS
Status: CANCELLED | OUTPATIENT
Start: 2020-09-28

## 2020-08-13 RX ORDER — SODIUM CHLORIDE 0.9 % (FLUSH) 0.9 %
10 SYRINGE (ML) INJECTION AS NEEDED
Status: CANCELLED
Start: 2020-09-16

## 2020-08-13 RX ORDER — ATROPINE SULFATE 0.4 MG/ML
0.4 INJECTION, SOLUTION ENDOTRACHEAL; INTRAMEDULLARY; INTRAMUSCULAR; INTRAVENOUS; SUBCUTANEOUS ONCE
Status: CANCELLED | OUTPATIENT
Start: 2020-09-28

## 2020-08-13 RX ORDER — ONDANSETRON 2 MG/ML
8 INJECTION INTRAMUSCULAR; INTRAVENOUS AS NEEDED
Status: CANCELLED | OUTPATIENT
Start: 2020-08-31

## 2020-08-13 RX ORDER — SODIUM CHLORIDE 0.9 % (FLUSH) 0.9 %
10 SYRINGE (ML) INJECTION AS NEEDED
Status: CANCELLED | OUTPATIENT
Start: 2020-10-26

## 2020-08-13 RX ORDER — ALBUTEROL SULFATE 0.83 MG/ML
2.5 SOLUTION RESPIRATORY (INHALATION) AS NEEDED
Status: CANCELLED
Start: 2020-09-28

## 2020-08-13 RX ORDER — SODIUM CHLORIDE 0.9 % (FLUSH) 0.9 %
10 SYRINGE (ML) INJECTION AS NEEDED
Status: CANCELLED
Start: 2020-10-28

## 2020-08-13 RX ORDER — ATROPINE SULFATE 0.4 MG/ML
0.4 INJECTION, SOLUTION ENDOTRACHEAL; INTRAMEDULLARY; INTRAMUSCULAR; INTRAVENOUS; SUBCUTANEOUS ONCE
Status: CANCELLED | OUTPATIENT
Start: 2020-08-31

## 2020-08-13 RX ORDER — SODIUM CHLORIDE 9 MG/ML
10 INJECTION INTRAMUSCULAR; INTRAVENOUS; SUBCUTANEOUS AS NEEDED
Status: CANCELLED | OUTPATIENT
Start: 2020-09-30

## 2020-08-13 RX ORDER — SODIUM CHLORIDE 0.9 % (FLUSH) 0.9 %
10 SYRINGE (ML) INJECTION AS NEEDED
Status: CANCELLED
Start: 2020-09-30

## 2020-08-13 RX ORDER — DEXTROSE MONOHYDRATE 50 MG/ML
25 INJECTION, SOLUTION INTRAVENOUS CONTINUOUS
Status: CANCELLED | OUTPATIENT
Start: 2020-10-26

## 2020-08-13 RX ORDER — FLUOROURACIL 50 MG/ML
300 INJECTION, SOLUTION INTRAVENOUS ONCE
Status: CANCELLED | OUTPATIENT
Start: 2020-08-31 | End: 2020-08-31

## 2020-08-13 RX ORDER — SODIUM CHLORIDE 0.9 % (FLUSH) 0.9 %
10 SYRINGE (ML) INJECTION AS NEEDED
Status: CANCELLED | OUTPATIENT
Start: 2020-09-28

## 2020-08-13 RX ORDER — ATROPINE SULFATE 0.4 MG/ML
0.4 INJECTION, SOLUTION ENDOTRACHEAL; INTRAMEDULLARY; INTRAMUSCULAR; INTRAVENOUS; SUBCUTANEOUS ONCE
Status: CANCELLED | OUTPATIENT
Start: 2020-10-26

## 2020-08-13 RX ORDER — ONDANSETRON 2 MG/ML
8 INJECTION INTRAMUSCULAR; INTRAVENOUS AS NEEDED
Status: CANCELLED | OUTPATIENT
Start: 2020-10-26

## 2020-08-13 RX ORDER — EPINEPHRINE 1 MG/ML
0.3 INJECTION, SOLUTION, CONCENTRATE INTRAVENOUS AS NEEDED
Status: CANCELLED | OUTPATIENT
Start: 2020-10-26

## 2020-08-13 RX ORDER — FLUOROURACIL 50 MG/ML
300 INJECTION, SOLUTION INTRAVENOUS ONCE
Status: CANCELLED | OUTPATIENT
Start: 2020-09-28 | End: 2020-09-28

## 2020-08-13 RX ORDER — ACETAMINOPHEN 325 MG/1
650 TABLET ORAL AS NEEDED
Status: CANCELLED | OUTPATIENT
Start: 2020-08-31

## 2020-08-20 DIAGNOSIS — C25.9 MALIGNANT NEOPLASM OF PANCREAS, UNSPECIFIED LOCATION OF MALIGNANCY (HCC): Primary | ICD-10-CM

## 2020-08-24 RX ORDER — CEPHALEXIN 500 MG/1
500 CAPSULE ORAL 4 TIMES DAILY
Qty: 40 CAP | Refills: 0 | Status: SHIPPED | OUTPATIENT
Start: 2020-08-24 | End: 2020-09-16

## 2020-08-31 ENCOUNTER — TELEPHONE (OUTPATIENT)
Dept: ONCOLOGY | Age: 80
End: 2020-08-31

## 2020-08-31 NOTE — TELEPHONE ENCOUNTER
HIPAA verified. Returned patients call, he is taking Keflex has noted an increase in tiredness, per Side effects listed, this could be coming from the antibiotic. Patient stated \"okay\" he just wanted to be sure.   He will rest.

## 2020-08-31 NOTE — TELEPHONE ENCOUNTER
Patient wife called. Patient is feeling sleepy a lot more than usual. Patient would like to discuss antibiotics that was prescribed, & would like to know if that's a side effect.

## 2020-09-09 RX ORDER — DIPHENHYDRAMINE HYDROCHLORIDE 50 MG/ML
50 INJECTION, SOLUTION INTRAMUSCULAR; INTRAVENOUS AS NEEDED
Status: CANCELLED | OUTPATIENT
Start: 2020-09-14

## 2020-09-09 RX ORDER — ATROPINE SULFATE 0.4 MG/ML
0.4 INJECTION, SOLUTION ENDOTRACHEAL; INTRAMEDULLARY; INTRAMUSCULAR; INTRAVENOUS; SUBCUTANEOUS ONCE
Status: CANCELLED | OUTPATIENT
Start: 2020-09-14

## 2020-09-09 RX ORDER — HYDROCORTISONE SODIUM SUCCINATE 100 MG/2ML
100 INJECTION, POWDER, FOR SOLUTION INTRAMUSCULAR; INTRAVENOUS AS NEEDED
Status: CANCELLED | OUTPATIENT
Start: 2020-09-14

## 2020-09-09 RX ORDER — EPINEPHRINE 1 MG/ML
0.3 INJECTION, SOLUTION, CONCENTRATE INTRAVENOUS AS NEEDED
Status: CANCELLED | OUTPATIENT
Start: 2020-09-14

## 2020-09-09 RX ORDER — ATROPINE SULFATE 0.4 MG/ML
0.4 INJECTION, SOLUTION ENDOTRACHEAL; INTRAMEDULLARY; INTRAMUSCULAR; INTRAVENOUS; SUBCUTANEOUS
Status: CANCELLED | OUTPATIENT
Start: 2020-09-14

## 2020-09-09 RX ORDER — FLUOROURACIL 50 MG/ML
300 INJECTION, SOLUTION INTRAVENOUS ONCE
Status: CANCELLED | OUTPATIENT
Start: 2020-09-14 | End: 2020-09-14

## 2020-09-09 RX ORDER — PALONOSETRON 0.05 MG/ML
0.25 INJECTION, SOLUTION INTRAVENOUS ONCE
Status: CANCELLED
Start: 2020-09-14

## 2020-09-09 RX ORDER — HEPARIN 100 UNIT/ML
300-500 SYRINGE INTRAVENOUS AS NEEDED
Status: CANCELLED | OUTPATIENT
Start: 2020-09-14

## 2020-09-09 RX ORDER — ACETAMINOPHEN 325 MG/1
650 TABLET ORAL AS NEEDED
Status: CANCELLED | OUTPATIENT
Start: 2020-09-14

## 2020-09-09 RX ORDER — ONDANSETRON 2 MG/ML
8 INJECTION INTRAMUSCULAR; INTRAVENOUS AS NEEDED
Status: CANCELLED | OUTPATIENT
Start: 2020-09-14

## 2020-09-09 RX ORDER — DEXTROSE MONOHYDRATE 50 MG/ML
25 INJECTION, SOLUTION INTRAVENOUS CONTINUOUS
Status: CANCELLED | OUTPATIENT
Start: 2020-09-14

## 2020-09-09 RX ORDER — ALBUTEROL SULFATE 0.83 MG/ML
2.5 SOLUTION RESPIRATORY (INHALATION) AS NEEDED
Status: CANCELLED
Start: 2020-09-14

## 2020-09-09 RX ORDER — DIPHENHYDRAMINE HYDROCHLORIDE 50 MG/ML
50 INJECTION, SOLUTION INTRAMUSCULAR; INTRAVENOUS AS NEEDED
Status: CANCELLED
Start: 2020-09-14

## 2020-09-09 RX ORDER — SODIUM CHLORIDE 0.9 % (FLUSH) 0.9 %
10 SYRINGE (ML) INJECTION AS NEEDED
Status: CANCELLED | OUTPATIENT
Start: 2020-09-14

## 2020-09-09 RX ORDER — SODIUM CHLORIDE 0.9 % (FLUSH) 0.9 %
10 SYRINGE (ML) INJECTION AS NEEDED
Status: CANCELLED
Start: 2020-09-14

## 2020-09-15 RX ORDER — DULOXETIN HYDROCHLORIDE 60 MG/1
CAPSULE, DELAYED RELEASE ORAL
Qty: 90 CAP | Refills: 1 | Status: SHIPPED | OUTPATIENT
Start: 2020-09-15 | End: 2020-12-02

## 2020-09-15 NOTE — PROGRESS NOTES
Liang Gentile is a 79 y.o. male who is seen for follow up pancreatic cancer. Patient last chemo treatment was 9/14/2020, he is scheduled to have pump off today. Chemotherapy Flowsheet 9/14/2020   Cycle C38   Date 9/14/2020   Drug / Regimen Irinotecan/ Leucovorin   Dosage -   Time Up -   Time Down -   Pre Hydration -   Pre Meds Emend, Decadron, Aloxi, Atropine   Notes CIV 5FU pump     Key Oncology Meds             ondansetron (ZOFRAN ODT) 4 mg disintegrating tablet Take 1 Tab by mouth every eight (8) hours as needed for Nausea. Indications: Nausea and Vomiting caused by Cancer Drugs        Key Pain Meds             ibuprofen (MOTRIN) 200 mg tablet Take 400 mg by mouth daily as needed for Pain. acetaminophen (TYLENOL EXTRA STRENGTH) 500 mg tablet Take 500 mg by mouth every six (6) hours as needed for Pain.

## 2020-09-16 ENCOUNTER — OFFICE VISIT (OUTPATIENT)
Dept: ONCOLOGY | Age: 80
End: 2020-09-16

## 2020-09-16 VITALS
BODY MASS INDEX: 24.63 KG/M2 | SYSTOLIC BLOOD PRESSURE: 107 MMHG | TEMPERATURE: 97.6 F | HEIGHT: 73 IN | DIASTOLIC BLOOD PRESSURE: 68 MMHG | WEIGHT: 185.8 LBS | HEART RATE: 81 BPM | RESPIRATION RATE: 16 BRPM | OXYGEN SATURATION: 94 %

## 2020-09-16 DIAGNOSIS — C25.0 MALIGNANT NEOPLASM OF HEAD OF PANCREAS (HCC): Primary | ICD-10-CM

## 2020-09-16 RX ORDER — ALFUZOSIN HYDROCHLORIDE 10 MG/1
TABLET, EXTENDED RELEASE ORAL DAILY
COMMUNITY

## 2020-09-16 NOTE — PATIENT INSTRUCTIONS

## 2020-09-16 NOTE — PROGRESS NOTES
Reason for Visit:   Paulie Donnelly [facility] is seen for follow up pancreatic cancer     Treatment History:   Dx: Metastatic Pancreatic Adenocarcinoma--Nov 2018--peritoneal mets  Tx: FOLFIRINOX--first cycle 2/4/19, second cycle 2/18/19, third cycle 3/4/19, fourth cycle on 3/18/19, 5th on 4/1/19, 6th 4/15/2019, 7th 4/29/19, 8th 5/13/19, 9th 6/5/19 (dose reduced due to toxicity), 10th 6/24/19, 11th 7/5/19, 12th 7/22/2019, 13th 8/5/2019, 14th 8/19/2019, 15th 8/9/2019, 16th 9/3/2019, 17th 9/23/2019, 18th 10/7/2019, 19 10/21/2019, 20 11/4/2019, 21 11/18/2019, 22 12/2/2019 (stopped the Hiren Keegan), 23 12/16/2019 (no Oxali), 24 1/6/2020 (no Oxali), 25 1/20/2020 (no Oxali), 26 2/3/2020 (no Oxali), 27 2/17/2020 (no Oxali), 28 3/2/2020 (no Oxali), 29 3/16/2020 (no Oxali), 30 3/30/2020 (no Oxali), 31 4/13/2020 (no Oxali), 32 4/13/2020 (no Oxali), 33 5/11/2020 (no Oxali)---3 week break-- 34 6/1/2020 (no Oxali), 35 6/15/2020 (no Oxali), 36 6/29/2020 (no Oxaili), 37 7/13/2020 (no Oxali)  Goal: prolong survival, Cycle length: until progression, Will return to see me prior to each cycle.     History of Present Illness:   Doing ok, started on midodrine and his bp has been much better, no dizzy spells. Wt stable, appetite is good. No n/v/d/consitpation.     Past Medical History:   Diagnosis Date    CAD (coronary artery disease)     3 cardiac stents   heart Dr. Peggy Tucker Cervical spondylosis without myelopathy 2009    Chronic pain     shoulder left, upper lumbar    Depressive disorder, not elsewhere classified     Diaphragmatic hernia without mention of obstruction or gangrene     GERD (gastroesophageal reflux disease)     Hypercholesterolemia     Hypertension     Hypertrophy of prostate without urinary obstruction and other lower urinary tract symptoms (LUTS) 2008    Insomnia, unspecified 2009    Lumbosacral spondylosis without myelopathy 2009    Osteoarthrosis involving, or with mention of more than one site, but not specified as generalized, multiple sites 2010    Varicose veins 2014      Past Surgical History:   Procedure Laterality Date    CABG, ARTERY-VEIN, TWO  12    CABG    HX APPENDECTOMY      HX CATARACT REMOVAL      bilateral    HX COLONOSCOPY  2010    x2 small beign polyps    HX COLONOSCOPY  2017    hyperplastic polyp    HX ENDOSCOPY  2010    acid reflux    [address]    3 stents    HX HEENT      fx nose repair    HX HERNIA REPAIR  early     left inguinal, with mesh    HX ORTHOPAEDIC  2017    lumbar fusion    HX OTHER SURGICAL  7 yrs. old    Hiatal Hernia    HX TONSILLECTOMY      HX VASCULAR ACCESS      IR INSERT TUNL CVAD W PORT LESS THAN 5 YR  2019      Social History     Tobacco Use    Smoking status: Former Smoker     Packs/day: 1.00     Years: 10.00     Pack years: 10.00     Types: Cigarettes     Start date: 1961     Last attempt to quit: 1971     Years since quittin.7    Smokeless tobacco: Never Used   Substance Use Topics    Alcohol use: No     Frequency: Never     Binge frequency: Never      Family History   Problem Relation Age of Onset    Hypertension Father     Heart Disease Father     Arthritis-osteo Mother     Cancer Brother 78        tumor in back    Heart Disease Brother     Diabetes Maternal Grandfather     Kidney Disease Maternal Grandfather      Current Outpatient Medications   Medication Sig    alfuzosin SR (UroxatraL) 10 mg SR tablet Take  by mouth daily.  DULoxetine (CYMBALTA) 60 mg capsule TAKE 1 CAPSULE BY MOUTH DAILY. INDICATIONS: CHRONIC MUSCLE OR BONE PAIN    silodosin (RAPAFLO) 8 mg capsule Take 8 mg by mouth nightly.  amiodarone (PACERONE) 100 mg tablet Take 100 mg by mouth daily.  midodrine (PROAMATINE) 5 mg tablet Take 1 Tab by mouth three (3) times daily (with meals).  metoprolol succinate (TOPROL-XL) 25 mg XL tablet Take 0.5 Tabs by mouth daily.     senna (Senna) 8.6 mg tablet Take 1 Tab by mouth daily.  cyclobenzaprine (FLEXERIL) 10 mg tablet Take 10 mg by mouth three (3) times daily as needed for Muscle Spasm(s).  cyanocobalamin (VITAMIN B12) 500 mcg tablet Take 500 mcg by mouth daily.  docusate sodium (DOK) 250 mg capsule Take 750 mg by mouth two (2) times a day.  ranolazine ER (RANEXA) 500 mg SR tablet Take 500 mg by mouth two (2) times a day.  ibuprofen (MOTRIN) 200 mg tablet Take 400 mg by mouth daily as needed for Pain.  nortriptyline (PAMELOR) 25 mg capsule Take 2 Caps by mouth nightly.  ondansetron (ZOFRAN ODT) 4 mg disintegrating tablet Take 1 Tab by mouth every eight (8) hours as needed for Nausea. Indications: Nausea and Vomiting caused by Cancer Drugs    dexAMETHasone (DECADRON) 4 mg tablet Take 8 mg by mouth daily. Indications: Prevent Nausea and Vomiting from Cancer Chemotherapy    rosuvastatin (CRESTOR) 10 mg tablet Take 1 Tab by mouth nightly. Indications: excessive fat in the blood    rivaroxaban (XARELTO) 20 mg tab tablet Take 1 Tab by mouth daily (with breakfast). Indications: a clot in the lung    acetaminophen (TYLENOL EXTRA STRENGTH) 500 mg tablet Take 500 mg by mouth every six (6) hours as needed for Pain.  omeprazole (PRILOSEC) 20 mg capsule Take 20 mg by mouth daily.  polyethylene glycol (MIRALAX) 17 gram/dose powder Take 17 g by mouth daily as needed.  multivitamin (ONE A DAY) tablet Take 1 Tab by mouth daily.  aspirin 81 mg tablet Take 81 mg by mouth.  albuterol (VENTOLIN HFA) 90 mcg/actuation inhaler Take 2 Puffs by inhalation every four (4) hours as needed for Wheezing.  LORazepam (ATIVAN) 0.5 mg tablet Take 1 Tab by mouth every four (4) hours as needed for Anxiety. Max Daily Amount: 3 mg. Indications: Nausea and Vomiting caused by Cancer Drugs     No current facility-administered medications for this visit.       Facility-Administered Medications Ordered in Other Visits   Medication Dose Route Frequency    saline peripheral flush soln 10 mL  10 mL InterCATHeter PRN    0.9% sodium chloride injection 10 mL  10 mL IntraVENous PRN    heparin (porcine) pf 300-500 Units  300-500 Units InterCATHeter PRN      No Known Allergies     Review of Systems: A complete review of systems was obtained, negative except as described above. Physical Exam:     Visit Vitals  /68   Pulse 81   Temp 97.6 °F (36.4 °C) (Skin)   Resp 16   Ht 6' 1\" (1.854 m)   Wt 185 lb 12.8 oz (84.3 kg)   SpO2 94%   BMI 24.51 kg/m²     ECOG PS: 0  General: No distress  Eyes: PERRLA, anicteric sclerae  HENT: Atraumatic, OP clear  Neck: Supple  Lymphatic: No cervical, supraclavicular, or inguinal adenopathy  Respiratory: CTAB, normal respiratory effort  CV: Normal rate, regular rhythm, no murmurs, no peripheral edema  GI: Soft, nontender, nondistended, no masses, no hepatomegaly, no splenomegaly  MS: Normal gait and station. Digits without clubbing or cyanosis. Skin: No rashes, ecchymoses, or petechiae. Normal temperature, turgor, and texture. Psych: Alert, oriented, appropriate affect, normal judgment/insight    Results:     Lab Results   Component Value Date/Time    WBC 4.1 09/14/2020 09:55 AM    HGB 11.0 (L) 09/14/2020 09:55 AM    HCT 33.3 (L) 09/14/2020 09:55 AM    PLATELET 488 30/62/6805 09:55 AM    .4 (H) 09/14/2020 09:55 AM    ABS.  NEUTROPHILS 2.3 09/14/2020 09:55 AM     Lab Results   Component Value Date/Time    Sodium 139 09/14/2020 09:55 AM    Potassium 4.5 09/14/2020 09:55 AM    Chloride 104 09/14/2020 09:55 AM    CO2 27 09/14/2020 09:55 AM    Glucose 109 (H) 09/14/2020 09:55 AM    BUN 13 09/14/2020 09:55 AM    Creatinine 1.00 09/14/2020 09:55 AM    GFR est AA >60 09/14/2020 09:55 AM    GFR est non-AA >60 09/14/2020 09:55 AM    Calcium 8.8 09/14/2020 09:55 AM    Glucose (POC) 112 (H) 01/12/2012 06:32 AM     Lab Results   Component Value Date/Time    Bilirubin, total 0.3 09/14/2020 09:55 AM    ALT (SGPT) 20 09/14/2020 09:55 AM    Alk. phosphatase 51 09/14/2020 09:55 AM    Protein, total 6.1 (L) 09/14/2020 09:55 AM    Albumin 3.2 (L) 09/14/2020 09:55 AM    Globulin 2.9 09/14/2020 09:55 AM       CT C/A/P 4/5/2019  Resolved pulmonary emboli possible persistent right femoral vein DVT. Positive carcinomatosis with interval decrease in amount of ascites. Incidental cholelithiasis, lower lumbar fusion and hepatic cysts     CT C/A/P 6/21/2019  1. Mild interval decrease in size of pancreatic head/neck mass. 2. Stable peritoneal carcinomatosis. New pocket of loculated ascites underneath  the right hemidiaphragm measuring 3.3 x 2.7 x 2.3 cm.  3. No evidence of metastatic disease in the chest.  4. Cholelithiasis      CT C/A/P 10/11/2019  IMPRESSION:  1. Further decrease in the size of the previously described small pancreatic  head mass lesion. 2. Continued evidence of focal nodular lesions in the anterior aspect of the  peritoneum compatible with carcinomatosis. No evidence of para-aortic  lymphadenopathy. 3. No focal intrathoracic nodules/mass lesions identified. No evidence of  mediastinal or hilar lymphadenopathy. 4. Persistence of the previously described focal, loculated collection of  ascites in the region of the right hemidiaphragm. 5. Normal sized liver. Persistence of the previously described multiple focal  low-density areas within liver suggestive of cysts. 6. Normal sized spleen. No focal splenic lesions identified. 7. Normal appearance of the kidneys. 8. Suboptimal distention of the urinary bladder (see discussion above) disease. 9. Relative enlargement of the prostate gland.     CT C/A/P 2/21/2020  IMPRESSION:  Stable small mass pancreatic neck. Unchanged peritoneal carcinomatosis and fluid  collection/peritoneal cystic lesion right subphrenic space. Stable hepatic  cysts. Cholelithiasis. No new evidence to suggest metastatic disease.     CT C/A/P 5/21/2020  IMPRESSION:  1.  No change in the appearance of the previously described small mass lesion in  the region of the neck of the pancreas. Continued evidence of peritoneal  carcinomatosis. No evidence of intra-abdominal, intrapelvic or inguinal  lymphadenopathy. 2. Normal sized liver with evidence of fatty infiltration. Presence of multiple  focal, well-demarcated fluid collections within the liver compatible with cysts. Stable appearance of the previously described subphrenic fluid collection. Continued evidence of cholelithiasis. 3. Presence of a moderate amount of gas and fecal material within the colon. 4. Normal appearance of the kidneys  5. Relative enlargement of the prostate gland. 6. Evidence of colonic diverticulosis. No evidence of diverticulitis. CT A/P 8/27/2020  IMPRESSION:  1. No change in the appearance of the previously described small mass lesion in  the region of the neck of the pancreas. Continued evidence of peritoneal  carcinomatosis. No evidence of periaortic lymphadenopathy. 2. Normal sized liver with evidence of fatty infiltration. No change in the  appearance of the previously described multiple well-demarcated fluid  collections within the liver compatible with cysts. Stable appearance of the  previously described subphrenic fluid collection. Continued evidence of  cholelithiasis. 3. Presence of a moderate amount of gas and fecal material within the colon. Continued evidence of colonic diverticulosis. No evidence of diverticulitis. 4. Normal appearance of the kidneys. Normal appearance of the urinary bladder. 5. Relative enlargement of the prostate gland.     Assessment:   1) Metastatic Pancreatic Carcinoma with Peritoneal Metastasis  2) Neoplasm Pain  3) PE  4) Back Pain  5) New CHF     Plan:   1) Continue with dose reduced FOLFIRINOX-Repeat Imaging in August showed stable disease, CA 19-9 has been up and down--recheck today.  We have stopped the Oxaliplatin due to his worsening neuropathy--back to see me in 2 weeks.   Scans show stable disease. Moving in 3 weeks. 2) Pain controlled much better since stopping the oxaliplatin--stopped the Fentanyl and oxycodone on his own, continue the nortriptyline at bedtime and that did seem to help neuropathy and sleep--will continue.  Repeat 19-9 with each cycle  3) Continue Xarelto 20mg daily  4) Likely due to OA/DJD as it hasn't improved with the improving pancreatic ca.   5) Following with Cardiology--med management--is not currently on cardiotoxic chemotherapy.      Signed By: Ariel Murry MD

## 2020-09-18 DIAGNOSIS — C25.0 MALIGNANT NEOPLASM OF HEAD OF PANCREAS (HCC): ICD-10-CM

## 2020-09-18 DIAGNOSIS — I26.99 OTHER PULMONARY EMBOLISM WITHOUT ACUTE COR PULMONALE, UNSPECIFIED CHRONICITY (HCC): ICD-10-CM

## 2020-09-18 RX ORDER — RIVAROXABAN 20 MG/1
TABLET, FILM COATED ORAL
Qty: 90 TAB | Refills: 3 | Status: SHIPPED | OUTPATIENT
Start: 2020-09-18

## 2020-09-18 NOTE — TELEPHONE ENCOUNTER
Patient called stating [facility] has been trying to get in touch with us to refill his Xarelto. Confirmed , and dosage.

## 2020-09-23 RX ORDER — PALONOSETRON 0.05 MG/ML
0.25 INJECTION, SOLUTION INTRAVENOUS ONCE
Status: CANCELLED
Start: 2020-10-12

## 2020-09-23 RX ORDER — HEPARIN 100 UNIT/ML
300-500 SYRINGE INTRAVENOUS AS NEEDED
Status: CANCELLED | OUTPATIENT
Start: 2020-10-14

## 2020-09-23 RX ORDER — ATROPINE SULFATE 0.4 MG/ML
0.4 INJECTION, SOLUTION ENDOTRACHEAL; INTRAMEDULLARY; INTRAMUSCULAR; INTRAVENOUS; SUBCUTANEOUS
Status: CANCELLED | OUTPATIENT
Start: 2020-10-12

## 2020-09-23 RX ORDER — ACETAMINOPHEN 325 MG/1
650 TABLET ORAL AS NEEDED
Status: CANCELLED | OUTPATIENT
Start: 2020-10-12

## 2020-09-23 RX ORDER — ALBUTEROL SULFATE 0.83 MG/ML
2.5 SOLUTION RESPIRATORY (INHALATION) AS NEEDED
Status: CANCELLED
Start: 2020-10-12

## 2020-09-23 RX ORDER — DEXTROSE MONOHYDRATE 50 MG/ML
25 INJECTION, SOLUTION INTRAVENOUS CONTINUOUS
Status: CANCELLED | OUTPATIENT
Start: 2020-10-12

## 2020-09-23 RX ORDER — SODIUM CHLORIDE 0.9 % (FLUSH) 0.9 %
10 SYRINGE (ML) INJECTION AS NEEDED
Status: CANCELLED | OUTPATIENT
Start: 2020-10-12

## 2020-09-23 RX ORDER — ONDANSETRON 2 MG/ML
8 INJECTION INTRAMUSCULAR; INTRAVENOUS AS NEEDED
Status: CANCELLED | OUTPATIENT
Start: 2020-10-12

## 2020-09-23 RX ORDER — SODIUM CHLORIDE 9 MG/ML
10 INJECTION INTRAMUSCULAR; INTRAVENOUS; SUBCUTANEOUS AS NEEDED
Status: CANCELLED | OUTPATIENT
Start: 2020-10-14

## 2020-09-23 RX ORDER — ATROPINE SULFATE 0.4 MG/ML
0.4 INJECTION, SOLUTION ENDOTRACHEAL; INTRAMEDULLARY; INTRAMUSCULAR; INTRAVENOUS; SUBCUTANEOUS ONCE
Status: CANCELLED | OUTPATIENT
Start: 2020-10-12

## 2020-09-23 RX ORDER — HEPARIN 100 UNIT/ML
300-500 SYRINGE INTRAVENOUS AS NEEDED
Status: CANCELLED | OUTPATIENT
Start: 2020-10-12

## 2020-09-23 RX ORDER — EPINEPHRINE 1 MG/ML
0.3 INJECTION, SOLUTION, CONCENTRATE INTRAVENOUS AS NEEDED
Status: CANCELLED | OUTPATIENT
Start: 2020-10-12

## 2020-09-23 RX ORDER — DIPHENHYDRAMINE HYDROCHLORIDE 50 MG/ML
50 INJECTION, SOLUTION INTRAMUSCULAR; INTRAVENOUS AS NEEDED
Status: CANCELLED
Start: 2020-10-12

## 2020-09-23 RX ORDER — SODIUM CHLORIDE 0.9 % (FLUSH) 0.9 %
10 SYRINGE (ML) INJECTION AS NEEDED
Status: CANCELLED
Start: 2020-10-12

## 2020-09-23 RX ORDER — FLUOROURACIL 50 MG/ML
300 INJECTION, SOLUTION INTRAVENOUS ONCE
Status: CANCELLED | OUTPATIENT
Start: 2020-10-12

## 2020-09-23 RX ORDER — HYDROCORTISONE SODIUM SUCCINATE 100 MG/2ML
100 INJECTION, POWDER, FOR SOLUTION INTRAMUSCULAR; INTRAVENOUS AS NEEDED
Status: CANCELLED | OUTPATIENT
Start: 2020-10-12

## 2020-09-23 RX ORDER — DIPHENHYDRAMINE HYDROCHLORIDE 50 MG/ML
50 INJECTION, SOLUTION INTRAMUSCULAR; INTRAVENOUS AS NEEDED
Status: CANCELLED | OUTPATIENT
Start: 2020-10-12

## 2020-09-23 RX ORDER — SODIUM CHLORIDE 0.9 % (FLUSH) 0.9 %
10 SYRINGE (ML) INJECTION AS NEEDED
Status: CANCELLED
Start: 2020-10-14

## 2020-10-02 ENCOUNTER — OFFICE VISIT (OUTPATIENT)
Dept: ONCOLOGY | Age: 80
End: 2020-10-02
Payer: MEDICARE

## 2020-10-02 VITALS
BODY MASS INDEX: 23.94 KG/M2 | SYSTOLIC BLOOD PRESSURE: 133 MMHG | HEART RATE: 72 BPM | OXYGEN SATURATION: 97 % | RESPIRATION RATE: 16 BRPM | HEIGHT: 73 IN | WEIGHT: 180.6 LBS | TEMPERATURE: 97.4 F | DIASTOLIC BLOOD PRESSURE: 76 MMHG

## 2020-10-02 DIAGNOSIS — C25.0 MALIGNANT NEOPLASM OF HEAD OF PANCREAS (HCC): Primary | ICD-10-CM

## 2020-10-02 PROCEDURE — 99213 OFFICE O/P EST LOW 20 MIN: CPT | Performed by: INTERNAL MEDICINE

## 2020-10-02 NOTE — PROGRESS NOTES
Reason for Visit:   [facility] is seen for follow up pancreatic cancer     Treatment History:   Dx: Metastatic Pancreatic Adenocarcinoma--Nov 2018--peritoneal mets  Tx: FOLFIRINOX--first cycle 2/4/19, second cycle 2/18/19, third cycle 3/4/19, fourth cycle on 3/18/19, 5th on 4/1/19, 6th 4/15/2019, 7th 4/29/19, 8th 5/13/19, 9th 6/5/19 (dose reduced due to toxicity), 10th 6/24/19, 11th 7/5/19, 12th 7/22/2019, 13th 8/5/2019, 14th 8/19/2019, 15th 8/9/2019, 16th 9/3/2019, 17th 9/23/2019, 18th 10/7/2019, 19 10/21/2019, 20 11/4/2019, 21 11/18/2019, 22 12/2/2019 (stopped the Mai Craig), 23 12/16/2019 (no Oxali), 24 1/6/2020 (no Oxali), 25 1/20/2020 (no Oxali), 26 2/3/2020 (no Oxali), 27 2/17/2020 (no Oxali), 28 3/2/2020 (no Oxali), 29 3/16/2020 (no Oxali), 30 3/30/2020 (no Oxali), 31 4/13/2020 (no Oxali), 32 4/13/2020 (no Oxali), 33 5/11/2020 (no Oxali)---3 week break-- 34 6/1/2020 (no Oxali), 35 6/15/2020 (no Oxali), 36 6/29/2020 (no Oxaili), 37 7/13/2020 (no Oxali)  Goal: prolong survival, Cycle length: until progression, Will return to see me prior to each cycle.       History of Present Illness:   Mr Travis Heller has no complaints, they have met with Oncologist in [address] [place] [address]. Will be moving next week.     Past Medical History:   Diagnosis Date    CAD (coronary artery disease)     3 cardiac stents   heart Dr. Severiano [place] Cervical spondylosis without myelopathy 2009    Chronic pain     shoulder left, upper lumbar    Depressive disorder, not elsewhere classified     Diaphragmatic hernia without mention of obstruction or gangrene     GERD (gastroesophageal reflux disease)     Hypercholesterolemia     Hypertension     Hypertrophy of prostate without urinary obstruction and other lower urinary tract symptoms (LUTS) 2008    Insomnia, unspecified 2009    Lumbosacral spondylosis without myelopathy 2009    Osteoarthrosis involving, or with mention of more than one site, but not specified as generalized, multiple sites 2010    Varicose veins 2014      Past Surgical History:   Procedure Laterality Date    CABG, ARTERY-VEIN, TWO  12    CABG    HX APPENDECTOMY      HX CATARACT REMOVAL  2015    bilateral    HX COLONOSCOPY  2010    x2 small beign polyps    HX COLONOSCOPY  2017    hyperplastic polyp    HX ENDOSCOPY  2010    acid reflux    HX HEART CATHETERIZATION  ,     3 stents    HX HEENT      fx nose repair    HX HERNIA REPAIR  early     left inguinal, with mesh    HX ORTHOPAEDIC  2017    lumbar fusion    HX OTHER SURGICAL  7 yrs. old    Hiatal Hernia    HX TONSILLECTOMY      HX VASCULAR ACCESS      IR INSERT TUNL CVAD W PORT LESS THAN 5 YR  2019      Social History     Tobacco Use    Smoking status: Former Smoker     Packs/day: 1.00     Years: 10.00     Pack years: 10.00     Types: Cigarettes     Start date: 1961     Last attempt to quit: 1971     Years since quittin.7    Smokeless tobacco: Never Used   Substance Use Topics    Alcohol use: No     Frequency: Never     Binge frequency: Never      Family History   Problem Relation Age of Onset    Hypertension Father     Heart Disease Father     Arthritis-osteo Mother     Cancer Brother 78        tumor in back    Heart Disease Brother     Diabetes Maternal Grandfather     Kidney Disease Maternal Grandfather      Current Outpatient Medications   Medication Sig    rivaroxaban (XARELTO) 20 mg tab tablet Take 1 Tab by mouth daily (with breakfast). Indications: a clot in the lung    alfuzosin SR (UroxatraL) 10 mg SR tablet Take  by mouth daily.  DULoxetine (CYMBALTA) 60 mg capsule TAKE 1 CAPSULE BY MOUTH DAILY. INDICATIONS: CHRONIC MUSCLE OR BONE PAIN    silodosin (RAPAFLO) 8 mg capsule Take 8 mg by mouth nightly.  amiodarone (PACERONE) 100 mg tablet Take 100 mg by mouth daily.     midodrine (PROAMATINE) 5 mg tablet Take 1 Tab by mouth three (3) times daily (with meals). (Patient taking differently: Take 5 mg by mouth two (2) times a day.)    metoprolol succinate (TOPROL-XL) 25 mg XL tablet Take 0.5 Tabs by mouth daily.  senna (Senna) 8.6 mg tablet Take 1 Tab by mouth daily.  cyanocobalamin (VITAMIN B12) 500 mcg tablet Take 500 mcg by mouth daily.  docusate sodium (DOK) 250 mg capsule Take 750 mg by mouth two (2) times a day.  ranolazine ER (RANEXA) 500 mg SR tablet Take 500 mg by mouth two (2) times a day.  nortriptyline (PAMELOR) 25 mg capsule Take 2 Caps by mouth nightly.  rosuvastatin (CRESTOR) 10 mg tablet Take 1 Tab by mouth nightly. Indications: excessive fat in the blood    acetaminophen (TYLENOL EXTRA STRENGTH) 500 mg tablet Take 500 mg by mouth every six (6) hours as needed for Pain.  omeprazole (PRILOSEC) 20 mg capsule Take 20 mg by mouth daily.  multivitamin (ONE A DAY) tablet Take 1 Tab by mouth daily.  aspirin 81 mg tablet Take 81 mg by mouth.  Xarelto 20 mg tab tablet TAKE 1 TABLET BY MOUTH DAILY (WITH BREAKFAST). INDICATIONS: A CLOT IN THE LUNG    cyclobenzaprine (FLEXERIL) 10 mg tablet Take 10 mg by mouth three (3) times daily as needed for Muscle Spasm(s).  ibuprofen (MOTRIN) 200 mg tablet Take 400 mg by mouth daily as needed for Pain.  ondansetron (ZOFRAN ODT) 4 mg disintegrating tablet Take 1 Tab by mouth every eight (8) hours as needed for Nausea. Indications: Nausea and Vomiting caused by Cancer Drugs    dexAMETHasone (DECADRON) 4 mg tablet Take 8 mg by mouth daily. Indications: Prevent Nausea and Vomiting from Cancer Chemotherapy    albuterol (VENTOLIN HFA) 90 mcg/actuation inhaler Take 2 Puffs by inhalation every four (4) hours as needed for Wheezing.  LORazepam (ATIVAN) 0.5 mg tablet Take 1 Tab by mouth every four (4) hours as needed for Anxiety. Max Daily Amount: 3 mg.  Indications: Nausea and Vomiting caused by Cancer Drugs    polyethylene glycol (MIRALAX) 17 gram/dose powder Take 17 g by mouth daily as needed. No current facility-administered medications for this visit. No Known Allergies     Review of Systems: A complete review of systems was obtained, negative except as described above. Physical Exam:     Visit Vitals  /76   Pulse 72   Temp 97.4 °F (36.3 °C) (Skin)   Resp 16   Ht 6' 1\" (1.854 m)   Wt 180 lb 9.6 oz (81.9 kg)   SpO2 97%   BMI 23.83 kg/m²     ECOG PS: 0  General: No distress  Eyes: PERRLA, anicteric sclerae  HENT: Atraumatic, OP clear  Neck: Supple  Lymphatic: No cervical, supraclavicular, or inguinal adenopathy  Respiratory: CTAB, normal respiratory effort  CV: Normal rate, regular rhythm, no murmurs, no peripheral edema  GI: Soft, nontender, nondistended, no masses, no hepatomegaly, no splenomegaly  MS: Normal gait and station. Digits without clubbing or cyanosis. Skin: No rashes, ecchymoses, or petechiae. Normal temperature, turgor, and texture. Psych: Alert, oriented, appropriate affect, normal judgment/insight    Results:     Lab Results   Component Value Date/Time    WBC 3.9 (L) 09/28/2020 10:10 AM    HGB 10.2 (L) 09/28/2020 10:10 AM    HCT 30.4 (L) 09/28/2020 10:10 AM    PLATELET 198 70/64/7502 10:10 AM    .1 (H) 09/28/2020 10:10 AM    ABS. NEUTROPHILS 2.5 09/28/2020 10:10 AM     Lab Results   Component Value Date/Time    Sodium 137 09/28/2020 10:10 AM    Potassium 4.5 09/28/2020 10:10 AM    Chloride 103 09/28/2020 10:10 AM    CO2 27 09/28/2020 10:10 AM    Glucose 106 (H) 09/28/2020 10:10 AM    BUN 13 09/28/2020 10:10 AM    Creatinine 0.97 09/28/2020 10:10 AM    GFR est AA >60 09/28/2020 10:10 AM    GFR est non-AA >60 09/28/2020 10:10 AM    Calcium 8.5 09/28/2020 10:10 AM    Glucose (POC) 112 (H) 01/12/2012 06:32 AM     Lab Results   Component Value Date/Time    Bilirubin, total 0.3 09/28/2020 10:10 AM    ALT (SGPT) 17 09/28/2020 10:10 AM    Alk.  phosphatase 47 09/28/2020 10:10 AM    Protein, total 5.8 (L) 09/28/2020 10:10 AM    Albumin 3.1 (L) 09/28/2020 10:10 AM Globulin 2.7 09/28/2020 10:10 AM       CT C/A/P 4/5/2019  Resolved pulmonary emboli possible persistent right femoral vein DVT. Positive carcinomatosis with interval decrease in amount of ascites. Incidental cholelithiasis, lower lumbar fusion and hepatic cysts     CT C/A/P 6/21/2019  1. Mild interval decrease in size of pancreatic head/neck mass. 2. Stable peritoneal carcinomatosis. New pocket of loculated ascites underneath  the right hemidiaphragm measuring 3.3 x 2.7 x 2.3 cm.  3. No evidence of metastatic disease in the chest.  4. Cholelithiasis      CT C/A/P 10/11/2019  IMPRESSION:  1. Further decrease in the size of the previously described small pancreatic  head mass lesion. 2. Continued evidence of focal nodular lesions in the anterior aspect of the  peritoneum compatible with carcinomatosis. No evidence of para-aortic  lymphadenopathy. 3. No focal intrathoracic nodules/mass lesions identified. No evidence of  mediastinal or hilar lymphadenopathy. 4. Persistence of the previously described focal, loculated collection of  ascites in the region of the right hemidiaphragm. 5. Normal sized liver. Persistence of the previously described multiple focal  low-density areas within liver suggestive of cysts. 6. Normal sized spleen. No focal splenic lesions identified. 7. Normal appearance of the kidneys. 8. Suboptimal distention of the urinary bladder (see discussion above) disease. 9. Relative enlargement of the prostate gland.     CT C/A/P 2/21/2020  IMPRESSION:  Stable small mass pancreatic neck. Unchanged peritoneal carcinomatosis and fluid  collection/peritoneal cystic lesion right subphrenic space. Stable hepatic  cysts. Cholelithiasis. No new evidence to suggest metastatic disease.     CT C/A/P 5/21/2020  IMPRESSION:  1. No change in the appearance of the previously described small mass lesion in  the region of the neck of the pancreas. Continued evidence of peritoneal  carcinomatosis.  No evidence of intra-abdominal, intrapelvic or inguinal  lymphadenopathy. 2. Normal sized liver with evidence of fatty infiltration. Presence of multiple  focal, well-demarcated fluid collections within the liver compatible with cysts. Stable appearance of the previously described subphrenic fluid collection. Continued evidence of cholelithiasis. 3. Presence of a moderate amount of gas and fecal material within the colon. 4. Normal appearance of the kidneys  5. Relative enlargement of the prostate gland. 6. Evidence of colonic diverticulosis. No evidence of diverticulitis.     CT A/P 8/27/2020  IMPRESSION:  1. No change in the appearance of the previously described small mass lesion in  the region of the neck of the pancreas. Continued evidence of peritoneal  carcinomatosis. No evidence of periaortic lymphadenopathy. 2. Normal sized liver with evidence of fatty infiltration. No change in the  appearance of the previously described multiple well-demarcated fluid  collections within the liver compatible with cysts. Stable appearance of the  previously described subphrenic fluid collection. Continued evidence of  cholelithiasis. 3. Presence of a moderate amount of gas and fecal material within the colon. Continued evidence of colonic diverticulosis. No evidence of diverticulitis. 4. Normal appearance of the kidneys. Normal appearance of the urinary bladder. 5. Relative enlargement of the prostate gland.     Assessment:   1) Metastatic Pancreatic Carcinoma with Peritoneal Metastasis  2) Neoplasm Pain  3) PE  4) Back Pain  5) New CHF     Plan:   1) Continue with dose reduced FOLFIRINOX-Repeat Imaging in August showed stable disease, CA 19-9 has been up but scans are stable.  We have stopped the Oxaliplatin due to his worsening neuropathy.     2) Pain controlled much better since stopping the oxaliplatin--stopped the Fentanyl and oxycodone on his own, continue the nortriptyline at bedtime and that did seem to help neuropathy and sleep--will continue.  Repeat 19-9 with each cycle  3) Continue Xarelto 20mg daily  4) Likely due to OA/DJD as it hasn't improved with the improving pancreatic ca.   5) Following with Cardiology--med management--is not currently on cardiotoxic chemotherapy.        Signed By: Shivam Campos MD

## 2020-10-02 NOTE — PATIENT INSTRUCTIONS

## 2020-10-02 NOTE — PROGRESS NOTES
Winnie Slade is a [de-identified] y.o. male here for f/u pancreatic cancer. Patient has back pain he associates [facility] arthritis.

## 2020-12-02 RX ORDER — DULOXETIN HYDROCHLORIDE 60 MG/1
CAPSULE, DELAYED RELEASE ORAL
Qty: 90 CAP | Refills: 0 | Status: SHIPPED | OUTPATIENT
Start: 2020-12-02 | End: 2021-05-05

## 2021-05-05 RX ORDER — DULOXETIN HYDROCHLORIDE 60 MG/1
CAPSULE, DELAYED RELEASE ORAL
Qty: 90 CAP | Refills: 0 | Status: SHIPPED | OUTPATIENT
Start: 2021-05-05 | End: 2021-08-04

## 2021-06-02 ENCOUNTER — TELEPHONE (OUTPATIENT)
Dept: ONCOLOGY | Age: 81
End: 2021-06-02

## 2021-06-02 NOTE — TELEPHONE ENCOUNTER
HIPAA verified. Spoke with Patient and his wife. Mr Juana Lombard states he has been taking the Nortriptyline for awhile, and he is having a lot of side effects. Like yesterday he was unsteady, bumping into things a lot, today he is better. He states he is monitoring his BP and it seems to be WNL. He is seeing Dr Michael Mariano in [facility] and is still seeing is cardiologist.  He is currently holding chemo for 3 weeks. Mr Juana Lombard feels the Nortriptyline is what is causing his problems and would like to get off of it, but he has read you cannot stop it all at once and would like Dr Kendy Osman to advise how to ween off of Nortriptyline. He said he was directed to call the ordering physician for this information.

## 2021-06-03 NOTE — TELEPHONE ENCOUNTER
HIPAA verified. Relayed message from Dr Zenaida Beverly to Mr Adeel Nicolas, he states he is only taking 50mg at this time.   He verbalized understanding and will stop the [facility]

## 2021-08-04 RX ORDER — DULOXETIN HYDROCHLORIDE 60 MG/1
CAPSULE, DELAYED RELEASE ORAL
Qty: 90 CAPSULE | Refills: 0 | Status: SHIPPED | OUTPATIENT
Start: 2021-08-04

## 2022-06-26 NOTE — ANESTHESIA PREPROCEDURE EVALUATION
Anesthetic History   No history of anesthetic complications            Review of Systems / Medical History  Patient summary reviewed, nursing notes reviewed and pertinent labs reviewed    Pulmonary                Comments: Former smoker - quit 1971 - 10 pack years   Neuro/Psych         Psychiatric history ( depression)    Comments: Lumbosacral spondylolisthesis with radiculopathy  Depression Cardiovascular    Hypertension: well controlled          CAD, cardiac stents, CABG (2012) and hyperlipidemia    Exercise tolerance: >4 METS     GI/Hepatic/Renal     GERD: well controlled           Endo/Other        Arthritis     Other Findings   Comments: H/o cervical spondylosis  H/o spinal fusion  Chronic pain upper back         Physical Exam    Airway  Mallampati: II  TM Distance: > 6 cm  Neck ROM: normal range of motion   Mouth opening: Normal     Cardiovascular  Regular rate and rhythm,  S1 and S2 normal,  no murmur, click, rub, or gallop  Rhythm: regular  Rate: normal      Pertinent negatives: No murmur   Dental    Dentition: Caps/crowns     Pulmonary  Breath sounds clear to auscultation               Abdominal  GI exam deferred       Other Findings            Anesthetic Plan    ASA: 3  Anesthesia type: general and regional - interscalene block    Monitoring Plan: BIS  Post-op pain plan if not by surgeon: peripheral nerve block single    Induction: Intravenous  Anesthetic plan and risks discussed with: Patient moderate assist (50% patients effort)/maximum assist (25% patients effort)

## (undated) DEVICE — SYR 10ML LUER LOK 1/5ML GRAD --

## (undated) DEVICE — (D)PREP SKN CHLRAPRP APPL 26ML -- CONVERT TO ITEM 371833

## (undated) DEVICE — MEDI-VAC NON-CONDUCTIVE SUCTION TUBING: Brand: CARDINAL HEALTH

## (undated) DEVICE — SUT ETHLN 3-0 18IN PS2 BLK --

## (undated) DEVICE — 4.5 MM FULL RADIUS STRAIGHT                                    BLADES, POWER/EP-1, YELLOW, PACKAGED                                    6 PER BOX, STERILE: Brand: DYONICS

## (undated) DEVICE — INFECTION CONTROL KIT SYS

## (undated) DEVICE — NEEDLE HYPO 18GA L1.5IN PNK S STL HUB POLYPR SHLD REG BVL

## (undated) DEVICE — SKIN MARKER,REGULAR TIP WITH RULER AND LABELS: Brand: DEVON

## (undated) DEVICE — CANNULA ARTHSCP L4CM DIA8MM PASSPRT BTTN

## (undated) DEVICE — SOLUTION IRRIG 3000ML LAC R FLX CONT

## (undated) DEVICE — KENDALL DL ECG CABLE AND LEAD WIRE SYSTEM, 3-LEAD, SINGLE PATIENT USE: Brand: KENDALL

## (undated) DEVICE — STERILE POLYISOPRENE POWDER-FREE SURGICAL GLOVES: Brand: PROTEXIS

## (undated) DEVICE — NEEDLE HYPO 25GA L1.5IN BVL ORIENTED ECLIPSE

## (undated) DEVICE — SET IV EXTN L7IN 05ML PRIMING STD BOR MAXZERO CONN PINCH

## (undated) DEVICE — CONTINU-FLO SOLUTION SET, 2 INJECTION SITES, MALE LUER LOCK ADAPTER WITH RETRACTABLE COLLAR, LARGE BORE STOPCOCK WITH ROTATING MALE LUER LOCK EXTENSION SET, 2 INJECTION SITES, MALE LUER LOCK ADAPTER WITH RETRACTABLE COLLAR: Brand: INTERLINK/CONTINU-FLO

## (undated) DEVICE — SYR 5ML 1/5 GRAD LL NSAF LF --

## (undated) DEVICE — TOWEL SURG W17XL27IN STD BLU COT NONFENESTRATED PREWASHED

## (undated) DEVICE — SHOULDER W/POUCH II-LF: Brand: MEDLINE INDUSTRIES, INC.

## (undated) DEVICE — BURR ACROMIONIZER 4.0 LG SUCT WDO DSPL: Brand: DYONICS / INCISOR

## (undated) DEVICE — TUBING SUCT INFLO OUTFLO FORKED SET ST DISP INTELJET

## (undated) DEVICE — SET 2ND L34IN N DEHP THE QUEENS MED CNTR VALUELINK

## (undated) DEVICE — SUTURE PDS II SZ 0 L36IN ABSRB VLT L36MM CT-1 1/2 CIR Z346H

## (undated) DEVICE — SUTURE FIBERWIRE SZ 2 L38IN NONABSORBABLE BLU WHT BLK AR7201

## (undated) DEVICE — GAUZE SPONGES,12 PLY: Brand: CURITY

## (undated) DEVICE — 3M™ TEGADERM™ TRANSPARENT FILM DRESSING FRAME STYLE, 1624W, 2-3/8 IN X 2-3/4 IN (6 CM X 7 CM), 100/CT 4CT/CASE: Brand: 3M™ TEGADERM™

## (undated) DEVICE — NEEDLE SUT PASS FOR ROT CUF LABRAL REP MULTFI SCORPION

## (undated) DEVICE — SUPER TURBOVAC 90 INTEGRATED CABLE WAND ICW: Brand: COBLATION

## (undated) DEVICE — SOLUTION LACTATED RINGERS INJECTION USP

## (undated) DEVICE — 4-PORT MANIFOLD: Brand: NEPTUNE 2

## (undated) DEVICE — POLYLINED TOWEL: Brand: CONVERTORS

## (undated) DEVICE — ADULT SPO2 SENSOR: Brand: NELLCOR

## (undated) DEVICE — SHOULDER SUSPENSION KIT 6 PER BOX